# Patient Record
Sex: FEMALE | Race: WHITE | NOT HISPANIC OR LATINO | Employment: OTHER | URBAN - NONMETROPOLITAN AREA
[De-identification: names, ages, dates, MRNs, and addresses within clinical notes are randomized per-mention and may not be internally consistent; named-entity substitution may affect disease eponyms.]

---

## 2023-09-01 ENCOUNTER — OFFICE VISIT (OUTPATIENT)
Dept: FAMILY MEDICINE CLINIC | Facility: CLINIC | Age: 68
End: 2023-09-01
Payer: COMMERCIAL

## 2023-09-01 VITALS
BODY MASS INDEX: 37.94 KG/M2 | OXYGEN SATURATION: 92 % | WEIGHT: 206.2 LBS | HEIGHT: 62 IN | DIASTOLIC BLOOD PRESSURE: 88 MMHG | RESPIRATION RATE: 18 BRPM | SYSTOLIC BLOOD PRESSURE: 152 MMHG | HEART RATE: 64 BPM | TEMPERATURE: 97.3 F

## 2023-09-01 DIAGNOSIS — E78.2 MIXED HYPERLIPIDEMIA: ICD-10-CM

## 2023-09-01 DIAGNOSIS — Z23 ENCOUNTER FOR IMMUNIZATION: ICD-10-CM

## 2023-09-01 DIAGNOSIS — M25.552 LEFT HIP PAIN: ICD-10-CM

## 2023-09-01 DIAGNOSIS — M25.561 CHRONIC PAIN OF RIGHT KNEE: ICD-10-CM

## 2023-09-01 DIAGNOSIS — E66.01 MORBID OBESITY DUE TO EXCESS CALORIES (HCC): ICD-10-CM

## 2023-09-01 DIAGNOSIS — J44.9 CHRONIC OBSTRUCTIVE PULMONARY DISEASE, UNSPECIFIED COPD TYPE (HCC): ICD-10-CM

## 2023-09-01 DIAGNOSIS — Z11.59 NEED FOR HEPATITIS C SCREENING TEST: ICD-10-CM

## 2023-09-01 DIAGNOSIS — Z12.31 ENCOUNTER FOR SCREENING MAMMOGRAM FOR BREAST CANCER: ICD-10-CM

## 2023-09-01 DIAGNOSIS — I10 HTN, GOAL BELOW 140/90: ICD-10-CM

## 2023-09-01 DIAGNOSIS — G89.29 CHRONIC PAIN OF RIGHT KNEE: ICD-10-CM

## 2023-09-01 DIAGNOSIS — Z12.4 SCREENING FOR CERVICAL CANCER: ICD-10-CM

## 2023-09-01 DIAGNOSIS — E11.3391 MODERATE NONPROLIFERATIVE DIABETIC RETINOPATHY OF RIGHT EYE WITHOUT MACULAR EDEMA ASSOCIATED WITH TYPE 2 DIABETES MELLITUS (HCC): ICD-10-CM

## 2023-09-01 DIAGNOSIS — R06.02 SOB (SHORTNESS OF BREATH): ICD-10-CM

## 2023-09-01 DIAGNOSIS — E11.9 TYPE 2 DIABETES MELLITUS WITH HEMOGLOBIN A1C GOAL OF LESS THAN 7.0% (HCC): Primary | ICD-10-CM

## 2023-09-01 PROBLEM — G47.30 SLEEP APNEA IN ADULT: Status: ACTIVE | Noted: 2022-01-18

## 2023-09-01 PROBLEM — E78.5 HYPERLIPIDEMIA: Status: ACTIVE | Noted: 2018-02-16

## 2023-09-01 PROBLEM — H90.3 BILATERAL SENSORINEURAL HEARING LOSS: Status: ACTIVE | Noted: 2022-08-23

## 2023-09-01 PROBLEM — Z87.442 HISTORY OF KIDNEY STONES: Status: ACTIVE | Noted: 2018-02-16

## 2023-09-01 LAB — SL AMB POCT HEMOGLOBIN AIC: 7.1 (ref ?–6.5)

## 2023-09-01 PROCEDURE — 83036 HEMOGLOBIN GLYCOSYLATED A1C: CPT | Performed by: FAMILY MEDICINE

## 2023-09-01 PROCEDURE — 99205 OFFICE O/P NEW HI 60 MIN: CPT | Performed by: FAMILY MEDICINE

## 2023-09-01 RX ORDER — LEVOTHYROXINE SODIUM 0.03 MG/1
TABLET ORAL
COMMUNITY
Start: 2023-08-19

## 2023-09-01 RX ORDER — CARVEDILOL 12.5 MG/1
TABLET ORAL
COMMUNITY
Start: 2023-08-19

## 2023-09-01 RX ORDER — PRAVASTATIN SODIUM 80 MG/1
80 TABLET ORAL DAILY
COMMUNITY
Start: 2023-08-24

## 2023-09-01 RX ORDER — DULAGLUTIDE 0.75 MG/.5ML
INJECTION, SOLUTION SUBCUTANEOUS
COMMUNITY
Start: 2023-07-25

## 2023-09-01 RX ORDER — OLMESARTAN MEDOXOMIL AND HYDROCHLOROTHIAZIDE 40/25 40; 25 MG/1; MG/1
1 TABLET ORAL DAILY
Qty: 90 TABLET | Refills: 3 | Status: SHIPPED | OUTPATIENT
Start: 2023-09-01

## 2023-09-01 RX ORDER — INSULIN GLARGINE 300 U/ML
INJECTION, SOLUTION SUBCUTANEOUS
COMMUNITY
Start: 2023-08-02

## 2023-09-01 NOTE — PROGRESS NOTES
Name: Emily Belcher      : 1955      MRN: [id number]  Encounter Provider: Priscilla Beard MD  Encounter Date: 2023   Encounter department: [address]     1. Type 2 diabetes mellitus with hemoglobin A1c goal of less than 7.0% [facility])  Assessment & Plan:    Lab Results   Component Value Date    HGBA1C 7.1 (A) 2023   Continue current. Orders:  -     POCT hemoglobin A1c  -     Lipid panel; Future  -     Comprehensive metabolic panel; Future; Expected date: 2023  -     TSH, 3rd generation with Free T4 reflex; Future  -     Albumin / creatinine urine ratio; Future    2. 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 Continue current. 3. Need for hepatitis C screening test  -     Hepatitis C Antibody; Future    4. Screening for cervical cancer    5. Encounter for screening mammogram for breast cancer    6. Encounter for immunization    7. Morbid obesity due to excess calories ([address])    8. Chronic obstructive pulmonary disease, unspecified COPD type ([address])  Assessment & Plan:  Stable. Continue current. 9. SOB (shortness of breath)  -     Echo complete w/ contrast if indicated; Future; Expected date: 2023    10. HTN, goal below 140/90  Assessment & Plan:  BP at goal.  Continue current. Orders:  -     olmesartan-hydrochlorothiazide (BENICAR HCT) 40-25 MG per tablet; Take 1 tablet by mouth daily    11. Chronic pain of right knee  -     MRI knee right  wo contrast; Future; Expected date: 2023    12. Left hip pain  -     XR hip/pelv 2-3 vws left if performed; Future; Expected date: 2023    13. Mixed hyperlipidemia  Assessment & Plan:  Stable. Continue current. Subjective      Her A1c is at goal at 7.1%.   She has experienced a few hypoglycemic events. She can recognize them and takes candy when she gets them. Her BP is elevated. She has trouble with SOB and fatigue with stairs. She has no HA. She has CAD. She had MI x 2. She has no CP but does have VALERIO. Her last lipid panel was not at goal.  She is not on high-intensity statin therapy. Review of Systems   All other systems reviewed and are negative. Current Outpatient Medications on File Prior to Visit   Medication Sig   • albuterol (PROVENTIL HFA,VENTOLIN HFA) 90 mcg/act inhaler Inhale   • amLODIPine (NORVASC) 10 mg tablet Take 10 mg by mouth daily   • carvedilol (COREG) 12.5 mg tablet    • clopidogrel (PLAVIX) 75 mg tablet Take 75 mg by mouth daily   • insulin detemir (LEVEMIR) 100 units/mL subcutaneous injection 40 Units daily at bedtime   • isosorbide mononitrate (IMDUR) 30 mg 24 hr tablet One by mouth daily   • levothyroxine 25 mcg tablet    • LORazepam (ATIVAN) 0.5 mg tablet TAKE 1 TABLET BY MOUTH AT BEDTIME AS NEEDED FOR ANXIETY   • metFORMIN (GLUCOPHAGE-XR) 500 mg 24 hr tablet TAKE 2 TABLETS BY MOUTH TWICE DAILY WITH  MORNING  AND  EVENING  MEALS   • nitroglycerin (NITROSTAT) 0.4 mg SL tablet 1 Tab Sublingual Every 5 minutes as needed for chest pain. If pain not resolved after third dose, seek emergency care   • pravastatin (PRAVACHOL) 80 mg tablet Take 80 mg by mouth daily   • tiotropium (SPIRIVA) 18 mcg inhalation capsule Place into inhaler and inhale   • Toujeo SoloStar 300 units/mL CONCENTRATED U-300 injection pen (1-unit dial) INJECT 52 UNITS SUBCUTANEOUSLY ONCE DAILY UP TO 60 UNITS DAILY   • Trulicity 3.14 UJ/5.5VA injection INJECT 0.75 MG UNDER THE SKIN ONCE A WEEK   • oxyCODONE (ROXICODONE) 10 MG TABS 5 mg at bedtime as needed.  (Patient not taking: Reported on 9/1/2023)       Objective     /88   Pulse 64   Temp (!) 97.3 °F (36.3 °C) (Temporal)   Resp 18   Ht 5' 2" (1.575 m)   Wt 93.5 kg (206 lb 3.2 oz)   SpO2 92%   BMI 37.71 kg/m²     Physical Exam  Vitals and nursing note reviewed. Constitutional:       Appearance: Normal appearance. She is obese. Cardiovascular:      Rate and Rhythm: Normal rate and regular rhythm. Pulses: Normal pulses. Heart sounds: Normal heart sounds. Pulmonary:      Effort: Pulmonary effort is normal.      Breath sounds: Normal breath sounds. Abdominal:      General: Abdomen is flat. Bowel sounds are normal.      Palpations: Abdomen is soft. Musculoskeletal:         General: Normal range of motion. Cervical back: Normal range of motion and neck supple. Skin:     General: Skin is warm and dry. Capillary Refill: Capillary refill takes less than 2 seconds. Neurological:      General: No focal deficit present. Mental Status: She is alert and oriented to person, place, and time. Mental status is at baseline. Psychiatric:         Mood and Affect: Mood normal.         Behavior: Behavior normal.         Thought Content:  Thought content normal.         Judgment: Judgment normal.       Pranav Guerrero MD

## 2023-09-05 NOTE — ASSESSMENT & PLAN NOTE
Lab Results   Component Value Date    HGBA1C 7.1 (A) 09/01/2023   A1c is fairly well controlled. Continue current.

## 2023-09-11 LAB
CREAT ?TM UR-SCNC: 45 UMOL/L
EXT ALBUMIN URINE RANDOM: 8.6
HCV AB SER-ACNC: NEGATIVE
MICROALBUMIN/CREAT UR: 191 MG/G{CREAT}

## 2023-09-15 ENCOUNTER — TELEPHONE (OUTPATIENT)
Dept: FAMILY MEDICINE CLINIC | Facility: CLINIC | Age: 68
End: 2023-09-15

## 2023-09-15 DIAGNOSIS — F41.1 GENERALIZED ANXIETY DISORDER: Primary | ICD-10-CM

## 2023-09-15 RX ORDER — LORAZEPAM 0.5 MG/1
0.5 TABLET ORAL
Qty: 30 TABLET | Refills: 0 | Status: SHIPPED | OUTPATIENT
Start: 2023-09-15

## 2023-09-29 ENCOUNTER — OFFICE VISIT (OUTPATIENT)
Dept: FAMILY MEDICINE CLINIC | Facility: CLINIC | Age: 68
End: 2023-09-29
Payer: COMMERCIAL

## 2023-09-29 VITALS
OXYGEN SATURATION: 96 % | TEMPERATURE: 98.2 F | WEIGHT: 210.6 LBS | DIASTOLIC BLOOD PRESSURE: 78 MMHG | RESPIRATION RATE: 16 BRPM | HEART RATE: 93 BPM | SYSTOLIC BLOOD PRESSURE: 138 MMHG | BODY MASS INDEX: 38.52 KG/M2

## 2023-09-29 DIAGNOSIS — Z13.820 SCREENING FOR OSTEOPOROSIS: ICD-10-CM

## 2023-09-29 DIAGNOSIS — E78.2 MIXED HYPERLIPIDEMIA: ICD-10-CM

## 2023-09-29 DIAGNOSIS — Z12.31 ENCOUNTER FOR SCREENING MAMMOGRAM FOR BREAST CANCER: ICD-10-CM

## 2023-09-29 DIAGNOSIS — I10 HTN, GOAL BELOW 140/90: ICD-10-CM

## 2023-09-29 DIAGNOSIS — I25.10 CORONARY ARTERY DISEASE INVOLVING NATIVE HEART WITHOUT ANGINA PECTORIS, UNSPECIFIED VESSEL OR LESION TYPE: ICD-10-CM

## 2023-09-29 DIAGNOSIS — E11.9 TYPE 2 DIABETES MELLITUS WITH HEMOGLOBIN A1C GOAL OF LESS THAN 7.0% (HCC): ICD-10-CM

## 2023-09-29 DIAGNOSIS — F41.1 GENERALIZED ANXIETY DISORDER: ICD-10-CM

## 2023-09-29 DIAGNOSIS — E11.3391 MODERATE NONPROLIFERATIVE DIABETIC RETINOPATHY OF RIGHT EYE WITHOUT MACULAR EDEMA ASSOCIATED WITH TYPE 2 DIABETES MELLITUS (HCC): ICD-10-CM

## 2023-09-29 DIAGNOSIS — J44.9 CHRONIC OBSTRUCTIVE PULMONARY DISEASE, UNSPECIFIED COPD TYPE (HCC): ICD-10-CM

## 2023-09-29 DIAGNOSIS — E03.9 HYPOTHYROIDISM, UNSPECIFIED TYPE: Primary | ICD-10-CM

## 2023-09-29 DIAGNOSIS — Z12.4 SCREENING FOR CERVICAL CANCER: ICD-10-CM

## 2023-09-29 PROBLEM — Z59.9 FINANCIAL DIFFICULTIES: Status: ACTIVE | Noted: 2023-06-01

## 2023-09-29 PROBLEM — Z53.20 COLONOSCOPY REFUSED: Status: ACTIVE | Noted: 2019-03-20

## 2023-09-29 PROBLEM — F17.200 TOBACCO USE DISORDER: Status: ACTIVE | Noted: 2022-01-18

## 2023-09-29 PROCEDURE — G0439 PPPS, SUBSEQ VISIT: HCPCS | Performed by: FAMILY MEDICINE

## 2023-09-29 PROCEDURE — 99215 OFFICE O/P EST HI 40 MIN: CPT | Performed by: FAMILY MEDICINE

## 2023-09-29 RX ORDER — LEVOTHYROXINE SODIUM 0.05 MG/1
50 TABLET ORAL DAILY
Qty: 90 TABLET | Refills: 3 | Status: SHIPPED | OUTPATIENT
Start: 2023-09-29

## 2023-09-29 RX ORDER — LORAZEPAM 0.5 MG/1
0.5 TABLET ORAL
Qty: 30 TABLET | Refills: 5 | Status: SHIPPED | OUTPATIENT
Start: 2023-09-29

## 2023-09-29 NOTE — PROGRESS NOTES
Assessment and Plan:     Problem List Items Addressed This Visit        Endocrine    Type 2 diabetes mellitus with hemoglobin A1c goal of less than 7.0% ([address])   Other Visit Diagnoses     Screening for cervical cancer        Encounter for screening mammogram for breast cancer               Preventive health issues were discussed with patient, and age appropriate screening tests were ordered as noted in patient's After Visit Summary. Personalized health advice and appropriate referrals for health education or preventive services given if needed, as noted in patient's After Visit Summary. History of Present Illness:     Patient presents for a Medicare Wellness Visit    HPI   Patient Care Team:  Linda Bhat MD as PCP - General (Family Medicine)  Linda Bhat MD as PCP - [address] [place] [address] [facility] ([facility]     Review of Systems:     Review of Systems     Problem List:     Patient Active Problem List   Diagnosis   • Bilateral sensorineural hearing loss   • CAD (coronary artery disease)   • Chronic obstructive pulmonary disease (HCC)   • Generalized anxiety disorder   • History of kidney stones   • HTN, goal below 140/90   • Hyperlipidemia   • Moderate nonproliferative diabetic retinopathy of right eye without macular edema associated with type 2 diabetes mellitus ([address])   • Obesity, Class II, BMI 35-39.9, isolated (see actual BMI)   • Sleep apnea in adult   • Type 2 diabetes mellitus with hemoglobin A1c goal of less than 7.0% ([address])   • Morbid obesity due to excess calories [facility])      Past Medical and Surgical History:     Past Medical History:   Diagnosis Date   • Anemia    • Diabetes type 2, controlled ([address])    • Heart attack ([address])    • Hypertension      Past Surgical History:   Procedure Laterality Date   • CARDIAC CATHETERIZATION     • SHOULDER SURGERY Right       Family History:     No family history on file.    Social History:     Social History     Socioeconomic History   • Marital status: /Civil Union Spouse name: Not on file   • Number of children: Not on file   • Years of education: Not on file   • Highest education level: Not on file   Occupational History   • Not on file   Tobacco Use   • Smoking status: Every Day     Packs/day: 0.25     Types: Cigarettes   • Smokeless tobacco: Never   Vaping Use   • Vaping Use: Never used   Substance and Sexual Activity   • Alcohol use: Not Currently   • Drug use: Never   • Sexual activity: Not on file   Other Topics Concern   • Not on file   Social History Narrative   • Not on file     Social Determinants of Health     Financial Resource Strain: Not on file   Food Insecurity: Not on file   Transportation Needs: Not on file   Physical Activity: Not on file   Stress: Not on file   Social Connections: Not on file   Intimate Partner Violence: Not on file   Housing Stability: Not on file      Medications and Allergies:     Current Outpatient Medications   Medication Sig Dispense Refill   • albuterol (PROVENTIL HFA,VENTOLIN HFA) 90 mcg/act inhaler Inhale     • amLODIPine (NORVASC) 10 mg tablet Take 10 mg by mouth daily     • carvedilol (COREG) 12.5 mg tablet      • clopidogrel (PLAVIX) 75 mg tablet Take 75 mg by mouth daily     • insulin detemir (LEVEMIR) 100 units/mL subcutaneous injection 40 Units daily at bedtime     • isosorbide mononitrate (IMDUR) 30 mg 24 hr tablet One by mouth daily     • levothyroxine 25 mcg tablet      • LORazepam (ATIVAN) 0.5 mg tablet Take 1 tablet (0.5 mg total) by mouth daily at bedtime as needed for anxiety 30 tablet 0   • metFORMIN (GLUCOPHAGE-XR) 500 mg 24 hr tablet TAKE 2 TABLETS BY MOUTH TWICE DAILY WITH  MORNING  AND  EVENING  MEALS     • nitroglycerin (NITROSTAT) 0.4 mg SL tablet 1 Tab Sublingual Every 5 minutes as needed for chest pain.  If pain not resolved after third dose, seek emergency care     • olmesartan-hydrochlorothiazide (BENICAR HCT) 40-25 MG per tablet Take 1 tablet by mouth daily 90 tablet 3   • oxyCODONE (ROXICODONE) 10 MG TABS 5 mg at bedtime as needed. (Patient not taking: Reported on 9/1/2023)     • pravastatin (PRAVACHOL) 80 mg tablet Take 80 mg by mouth daily     • tiotropium (SPIRIVA) 18 mcg inhalation capsule Place into inhaler and inhale     • Toujeo SoloStar 300 units/mL CONCENTRATED U-300 injection pen (1-unit dial) INJECT 52 UNITS SUBCUTANEOUSLY ONCE DAILY UP TO 60 UNITS DAILY     • Trulicity 2.44 WC/5.4ZS injection INJECT 0.75 MG UNDER THE SKIN ONCE A WEEK       No current facility-administered medications for this visit. No Known Allergies   Immunizations: There is no immunization history on file for this patient. Health Maintenance:         Topic Date Due   • Cervical Cancer Screening  Never done   • Breast Cancer Screening: Mammogram  Never done   • Colorectal Cancer Screening  Never done   • Hepatitis C Screening  Completed         Topic Date Due   • COVID-19 Vaccine (1) Never done   • Influenza Vaccine (1) 09/01/2023      Medicare Screening Tests and Risk Assessments:     Jourdan Worrell is here for her Subsequent Wellness visit. Last Medicare Wellness visit information reviewed, patient interviewed and updates made to the record today. Health Risk Assessment:   Patient rates overall health as fair. Patient feels that their physical health rating is slightly worse. Patient is very dissatisfied with their life. Eyesight was rated as slightly worse. Hearing was rated as slightly worse. Patient feels that their emotional and mental health rating is much worse. Patients states they are sometimes angry. Patient states they are often unusually tired/fatigued. Pain experienced in the last 7 days has been some. Patient's pain rating has been 4/10. Patient states that she has experienced no weight loss or gain in last 6 months. Depression Screening:   PHQ-2 Score: 2      Fall Risk Screening:    In the past year, patient has experienced: history of falling in past year    Number of falls: 2 or more  Injured during fall?: Yes    Feels unsteady when standing or walking?: No    Worried about falling?: Yes      Urinary Incontinence Screening:   Patient has leaked urine accidently in the last six months. Home Safety:  Patient has trouble with stairs inside or outside of their home. Patient has working smoke alarms and has working carbon monoxide detector. Home safety hazards include: none. Nutrition:   Current diet is Diabetic, Low Cholesterol and Low Saturated Fat. Medications:   Patient is not currently taking any over-the-counter supplements. Patient is able to manage medications. Activities of Daily Living (ADLs)/Instrumental Activities of Daily Living (IADLs):   Walk and transfer into and out of bed and chair?: Yes  Dress and groom yourself?: Yes    Bathe or shower yourself?: Yes    Feed yourself?  Yes  Do your laundry/housekeeping?: Yes  Manage your money, pay your bills and track your expenses?: Yes  Make your own meals?: Yes    Do your own shopping?: Yes    Previous Hospitalizations:   Any hospitalizations or ED visits within the last 12 months?: No      Advance Care Planning:   Living will: No    Durable POA for healthcare: No    Advanced directive: No    Five wishes given: Yes      Cognitive Screening:   Provider or family/friend/caregiver concerned regarding cognition?: No    PREVENTIVE SCREENINGS      Cardiovascular Screening:    General: Screening Not Indicated and History Lipid Disorder      Diabetes Screening:     General: Screening Not Indicated and History Diabetes      Colorectal Cancer Screening:     General: Patient Declines      Breast Cancer Screening:     General: Patient Declines      Cervical Cancer Screening:    General: Screening Not Indicated      Osteoporosis Screening:    General: Patient Declines      Abdominal Aortic Aneurysm (AAA) Screening:        General: Screening Not Indicated      Lung Cancer Screening:     General: Screening Not Indicated      Hepatitis C Screening:    General: Screening Current    Screening, Brief Intervention, and Referral to Treatment (SBIRT)    Screening  Typical number of drinks in a day: 0  Typical number of drinks in a week: 0  Interpretation: Low risk drinking behavior. Single Item Drug Screening:  How often have you used an illegal drug (including marijuana) or a prescription medication for non-medical reasons in the past year? never    Single Item Drug Screen Score: 0  Interpretation: Negative screen for possible drug use disorder    No results found. Physical Exam:     There were no vitals taken for this visit.     Physical Exam     Farrah Larkin MD

## 2023-09-29 NOTE — PATIENT INSTRUCTIONS
Medicare Preventive Visit Patient Instructions  Thank you for completing your Welcome to Medicare Visit or Medicare Annual Wellness Visit today. Your next wellness visit will be due in one year (9/29/2024). The screening/preventive services that you may require over the next 5-10 years are detailed below. Some tests may not apply to you based off risk factors and/or age. Screening tests ordered at today's visit but not completed yet may show as past due. Also, please note that scanned in results may not display below. Preventive Screenings:  Service Recommendations Previous Testing/Comments   Colorectal Cancer Screening  * Colonoscopy    * Fecal Occult Blood Test (FOBT)/Fecal Immunochemical Test (FIT)  * Fecal DNA/Cologuard Test  * Flexible Sigmoidoscopy Age: 43-73 years old   Colonoscopy: every 10 years (may be performed more frequently if at higher risk)  OR  FOBT/FIT: every 1 year  OR  Cologuard: every 3 years  OR  Sigmoidoscopy: every 5 years  Screening may be recommended earlier than age 39 if at higher risk for colorectal cancer. Also, an individualized decision between you and your healthcare provider will decide whether screening between the ages of 77-80 would be appropriate. Colonoscopy: Not on file  FOBT/FIT: Not on file  Cologuard: Not on file  Sigmoidoscopy: Not on file          Breast Cancer Screening Age: 36 years old  Frequency: every 1-2 years  Not required if history of left and right mastectomy Mammogram: Not on file        Cervical Cancer Screening Between the ages of 21-29, pap smear recommended once every 3 years. Between the ages of 32-69, can perform pap smear with HPV co-testing every 5 years.    Recommendations may differ for women with a history of total hysterectomy, cervical cancer, or abnormal pap smears in past. Pap Smear: Not on file    Screening Not Indicated   Hepatitis C Screening Once for adults born between [address] [place] [address]  More frequently in patients at high risk for Hepatitis C Hep C Antibody: 09/11/2023    Screening Current   Diabetes Screening 1-2 times per year if you're at risk for diabetes or have pre-diabetes Fasting glucose: No results in last 5 years (No results in last 5 years)  A1C: 7.1 (9/1/2023)  Screening Not Indicated  History Diabetes   Cholesterol Screening Once every 5 years if you don't have a lipid disorder. May order more often based on risk factors. Lipid panel: 09/11/2023    Screening Not Indicated  History Lipid Disorder     Other Preventive Screenings Covered by Medicare:  1. Abdominal Aortic Aneurysm (AAA) Screening: covered once if your at risk. You're considered to be at risk if you have a family history of AAA. 2. Lung Cancer Screening: covers low dose CT scan once per year if you meet all of the following conditions: (1) Age 48-67; (2) No signs or symptoms of lung cancer; (3) Current smoker or have quit smoking within the last 15 years; (4) You have a tobacco smoking history of at least 20 pack years (packs per day multiplied by number of years you smoked); (5) You get a written order from a healthcare provider. 3. Glaucoma Screening: covered annually if you're considered high risk: (1) You have diabetes OR (2) Family history of glaucoma OR (3)  aged 48 and older OR (3)  American aged 72 and older  3. Osteoporosis Screening: covered every 2 years if you meet one of the following conditions: (1) You're estrogen deficient and at risk for osteoporosis based off medical history and other findings; (2) Have a vertebral abnormality; (3) On glucocorticoid therapy for more than 3 months; (4) Have primary hyperparathyroidism; (5) On osteoporosis medications and need to assess response to drug therapy. · Last bone density test (DXA Scan): Not on file. 5. HIV Screening: covered annually if you're between the age of 14-79. Also covered annually if you are younger than 13 and older than 72 with risk factors for HIV infection.  For pregnant patients, it is covered up to 3 times per pregnancy. Immunizations:  Immunization Recommendations   Influenza Vaccine Annual influenza vaccination during flu season is recommended for all persons aged >= 6 months who do not have contraindications   Pneumococcal Vaccine   * Pneumococcal conjugate vaccine = PCV13 (Prevnar 13), PCV15 (Vaxneuvance), PCV20 (Prevnar 20)  * Pneumococcal polysaccharide vaccine = PPSV23 (Pneumovax) Adults 20-63 years old: 1-3 doses may be recommended based on certain risk factors  Adults 72 years old: 1-2 doses may be recommended based off what pneumonia vaccine you previously received   Hepatitis B Vaccine 3 dose series if at intermediate or high risk (ex: diabetes, end stage renal disease, liver disease)   Tetanus (Td) Vaccine - COST NOT COVERED BY MEDICARE PART B Following completion of primary series, a booster dose should be given every 10 years to maintain immunity against tetanus. Td may also be given as tetanus wound prophylaxis. Tdap Vaccine - COST NOT COVERED BY MEDICARE PART B Recommended at least once for all adults. For pregnant patients, recommended with each pregnancy. Shingles Vaccine (Shingrix) - COST NOT COVERED BY MEDICARE PART B  2 shot series recommended in those aged 48 and above     Health Maintenance Due:      Topic Date Due   • Cervical Cancer Screening  Never done   • Breast Cancer Screening: Mammogram  Never done   • Colorectal Cancer Screening  Never done   • Hepatitis C Screening  Completed     Immunizations Due:      Topic Date Due   • COVID-19 Vaccine (1) Never done   • Influenza Vaccine (1) 09/01/2023     Advance Directives   What are advance directives? Advance directives are legal documents that state your wishes and plans for medical care. These plans are made ahead of time in case you lose your ability to make decisions for yourself.  Advance directives can apply to any medical decision, such as the treatments you want, and if you want to donate organs. What are the types of advance directives? There are many types of advance directives, and each state has rules about how to use them. You may choose a combination of any of the following:  · Living will: This is a written record of the treatment you want. You can also choose which treatments you do not want, which to limit, and which to stop at a certain time. This includes surgery, medicine, IV fluid, and tube feedings. · Durable power of  for healthcare [facility]): This is a written record that states who you want to make healthcare choices for you when you are unable to make them for yourself. This person, called a proxy, is usually a family member or a friend. You may choose more than 1 proxy. · Do not resuscitate (DNR) order:  A DNR order is used in case your heart stops beating or you stop breathing. It is a request not to have certain forms of treatment, such as CPR. A DNR order may be included in other types of advance directives. · Medical directive: This covers the care that you want if you are in a coma, near death, or unable to make decisions for yourself. You can list the treatments you want for each condition. Treatment may include pain medicine, surgery, blood transfusions, dialysis, IV or tube feedings, and a ventilator (breathing machine). · Values history: This document has questions about your views, beliefs, and how you feel and think about life. This information can help others choose the care that you would choose. Why are advance directives important? An advance directive helps you control your care. Although spoken wishes may be used, it is better to have your wishes written down. Spoken wishes can be misunderstood, or not followed. Treatments may be given even if you do not want them. An advance directive may make it easier for your family to make difficult choices about your care.    Fall Prevention    Fall prevention  includes ways to make your home and other areas safer. It also includes ways you can move more carefully to prevent a fall. Health conditions that cause changes in your blood pressure, vision, or muscle strength and coordination may increase your risk for falls. Medicines may also increase your risk for falls if they make you dizzy, weak, or sleepy. Fall prevention tips:   · Stand or sit up slowly. · Use assistive devices as directed. · Wear shoes that fit well and have soles that . · Wear a personal alarm. · Stay active. · Manage your medical conditions. Home Safety Tips:  · Add items to prevent falls in the bathroom. · Keep paths clear. · Install bright lights in your home. · Keep items you use often on shelves within reach. · Paint or place reflective tape on the edges of your stairs. Urinary Incontinence   Urinary incontinence (UI)  is when you lose control of your bladder. UI develops because your bladder cannot store or empty urine properly. The 3 most common types of UI are stress incontinence, urge incontinence, or both. Medicines:   · May be given to help strengthen your bladder control. Report any side effects of medication to your healthcare provider. Do pelvic muscle exercises often:  Your pelvic muscles help you stop urinating. Squeeze these muscles tight for 5 seconds, then relax for 5 seconds. Gradually work up to squeezing for 10 seconds. Do 3 sets of 15 repetitions a day, or as directed. This will help strengthen your pelvic muscles and improve bladder control. Train your bladder:  Go to the bathroom at set times, such as every 2 hours, even if you do not feel the urge to go. You can also try to hold your urine when you feel the urge to go. For example, hold your urine for 5 minutes when you feel the urge to go. As that becomes easier, hold your urine for 10 minutes. Self-care:   · Keep a UI record. Write down how often you leak urine and how much you leak.  Make a note of what you were doing when you leaked urine. · Drink liquids as directed. You may need to limit the amount of liquid you drink to help control your urine leakage. Do not drink any liquid right before you go to bed. Limit or do not have drinks that contain caffeine or alcohol. · Prevent constipation. Eat a variety of high-fiber foods. Good examples are high-fiber cereals, beans, vegetables, and whole-grain breads. Walking is the best way to trigger your intestines to have a bowel movement. · Exercise regularly and maintain a healthy weight. Weight loss and exercise will decrease pressure on your bladder and help you control your leakage. · Use a catheter as directed  to help empty your bladder. A catheter is a tiny, plastic tube that is put into your bladder to drain your urine. · Go to behavior therapy as directed. Behavior therapy may be used to help you learn to control your urge to urinate. Cigarette Smoking and Your Health   Risks to your health if you smoke:  Nicotine and other chemicals found in tobacco damage every cell in your body. Even if you are a light smoker, you have an increased risk for cancer, heart disease, and lung disease. If you are pregnant or have diabetes, smoking increases your risk for complications. Benefits to your health if you stop smoking:   · You decrease respiratory symptoms such as coughing, wheezing, and shortness of breath. · You reduce your risk for cancers of the lung, mouth, throat, kidney, bladder, pancreas, stomach, and cervix. If you already have cancer, you increase the benefits of chemotherapy. You also reduce your risk for cancer returning or a second cancer from developing. · You reduce your risk for heart disease, blood clots, heart attack, and stroke. · You reduce your risk for lung infections, and diseases such as pneumonia, asthma, chronic bronchitis, and emphysema. · Your circulation improves. More oxygen can be delivered to your body.  If you have diabetes, you lower your risk for complications, such as kidney, artery, and eye diseases. You also lower your risk for nerve damage. Nerve damage can lead to amputations, poor vision, and blindness. · You improve your body's ability to heal and to fight infections. For more information and support to stop smoking:   · [employer]. [employer]  Phone: 5- 973 - 181-9495  Web Address: www.[employer]  Weight Management   Why it is important to manage your weight:  Being overweight increases your risk of health conditions such as heart disease, high blood pressure, type 2 diabetes, and certain types of cancer. It can also increase your risk for osteoarthritis, sleep apnea, and other respiratory problems. Aim for a slow, steady weight loss. Even a small amount of weight loss can lower your risk of health problems. How to lose weight safely:  A safe and healthy way to lose weight is to eat fewer calories and get regular exercise. You can lose up about 1 pound a week by decreasing the number of calories you eat by 500 calories each day. Healthy meal plan for weight management:  A healthy meal plan includes a variety of foods, contains fewer calories, and helps you stay healthy. A healthy meal plan includes the following:  · Eat whole-grain foods more often. A healthy meal plan should contain fiber. Fiber is the part of grains, fruits, and vegetables that is not broken down by your body. Whole-grain foods are healthy and provide extra fiber in your diet. Some examples of whole-grain foods are whole-wheat breads and pastas, oatmeal, brown rice, and bulgur. · Eat a variety of vegetables every day. Include dark, leafy greens such as spinach, kale, jeromy greens, and mustard greens. Eat yellow and orange vegetables such as carrots, sweet potatoes, and winter squash. · Eat a variety of fruits every day. Choose fresh or canned fruit (canned in its own juice or light syrup) instead of juice.  Fruit juice has very little or no fiber.  · Eat low-fat dairy foods. Drink fat-free (skim) milk or 1% milk. Eat fat-free yogurt and low-fat cottage cheese. Try low-fat cheeses such as mozzarella and other reduced-fat cheeses. · Choose meat and other protein foods that are low in fat. Choose beans or other legumes such as split peas or lentils. Choose fish, skinless poultry (chicken or turkey), or lean cuts of red meat (beef or pork). Before you cook meat or poultry, cut off any visible fat. · Use less fat and oil. Try baking foods instead of frying them. Add less fat, such as margarine, sour cream, regular salad dressing and mayonnaise to foods. Eat fewer high-fat foods. Some examples of high-fat foods include french fries, doughnuts, ice cream, and cakes. · Eat fewer sweets. Limit foods and drinks that are high in sugar. This includes candy, cookies, regular soda, and sweetened drinks. Exercise:  Exercise at least 30 minutes per day on most days of the week. Some examples of exercise include walking, biking, dancing, and swimming. You can also fit in more physical activity by taking the stairs instead of the elevator or parking farther away from stores. Ask your healthcare provider about the best exercise plan for you. © Copyright [employer] 2018 Information is for End User's use only and may not be sold, redistributed or otherwise used for commercial purposes.  All illustrations and images included in CareNotes® are the copyrighted property of [employer] Inc. or [address] [place], [place] [address]ols

## 2023-09-29 NOTE — PROGRESS NOTES
Bedside shift change report given to Veronica (oncoming nurse) by ELBERT Palma (offgoing nurse). Report included the following information SBAR, Kardex, Procedure Summary, MAR and Recent Results. Name: Alaina Puri      : 1955      MRN: [id number]  Encounter Provider: Merritt Anderson MD  Encounter Date: 2023   Encounter department: [address] [place] [address]     1. Hypothyroidism, unspecified type  -     levothyroxine (Synthroid) 50 mcg tablet; Take 1 tablet (50 mcg total) by mouth daily    2. Type 2 diabetes mellitus with hemoglobin A1c goal of less than 7.0% ([facility])    3. Screening for cervical cancer    4. Encounter for screening mammogram for breast cancer  -     Mammo screening bilateral w 3d & cad; Future; Expected date: 2023    5. Screening for osteoporosis  -     DXA bone density spine hip and pelvis; Future; Expected date: 2023           Subjective      HPI  Review of Systems    Current Outpatient Medications on File Prior to Visit   Medication Sig   • albuterol (PROVENTIL HFA,VENTOLIN HFA) 90 mcg/act inhaler Inhale   • amLODIPine (NORVASC) 10 mg tablet Take 10 mg by mouth daily   • carvedilol (COREG) 12.5 mg tablet    • clopidogrel (PLAVIX) 75 mg tablet Take 75 mg by mouth daily   • insulin detemir (LEVEMIR) 100 units/mL subcutaneous injection 40 Units daily at bedtime   • isosorbide mononitrate (IMDUR) 30 mg 24 hr tablet One by mouth daily   • levothyroxine 25 mcg tablet    • LORazepam (ATIVAN) 0.5 mg tablet Take 1 tablet (0.5 mg total) by mouth daily at bedtime as needed for anxiety   • metFORMIN (GLUCOPHAGE-XR) 500 mg 24 hr tablet TAKE 2 TABLETS BY MOUTH TWICE DAILY WITH  MORNING  AND  EVENING  MEALS   • nitroglycerin (NITROSTAT) 0.4 mg SL tablet 1 Tab Sublingual Every 5 minutes as needed for chest pain.  If pain not resolved after third dose, seek emergency care   • pravastatin (PRAVACHOL) 80 mg tablet Take 80 mg by mouth daily   • tiotropium (SPIRIVA) 18 mcg inhalation capsule Place into inhaler and inhale   • Toujeo SoloStar 300 units/mL CONCENTRATED U-300 injection pen (1-unit dial) INJECT 52 UNITS SUBCUTANEOUSLY ONCE DAILY UP TO 60 UNITS DAILY   • Trulicity 8.42 MB/1.3IX injection INJECT 0.75 MG UNDER THE SKIN ONCE A WEEK   • olmesartan-hydrochlorothiazide (BENICAR HCT) 40-25 MG per tablet Take 1 tablet by mouth daily (Patient not taking: Reported on 9/29/2023)   • oxyCODONE (ROXICODONE) 10 MG TABS 5 mg at bedtime as needed.  (Patient not taking: Reported on 9/1/2023)       Objective     /78   Pulse 93   Temp 98.2 °F (36.8 °C) (Temporal)   Resp 16   Wt 95.5 kg (210 lb 9.6 oz)   SpO2 96%   BMI 38.52 kg/m²     Physical Exam  Tawnya Mills MD

## 2023-10-02 NOTE — PROGRESS NOTES
Name: Chiara West      : 1955      MRN: [id number]  Encounter Provider: Valeri Osullivan MD  Encounter Date: 2023   Encounter department: [address] [place] [address]     1. Hypothyroidism, unspecified type  -     levothyroxine (Synthroid) 50 mcg tablet; Take 1 tablet (50 mcg total) by mouth daily    2. Type 2 diabetes mellitus with hemoglobin A1c goal of less than 7.0% [facility])  Assessment & Plan:    Lab Results   Component Value Date    HGBA1C 7.1 (A) 2023     A1c at goal.  Continue current. 3. Screening for cervical cancer    4. Encounter for screening mammogram for breast cancer  -     Mammo screening bilateral w 3d & cad; Future; Expected date: 2023    5. Screening for osteoporosis  -     DXA bone density spine hip and pelvis; Future; Expected date: 2023    6. 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  Continue current. 7. Mixed hyperlipidemia  Assessment & Plan:  She does not tolerate statins. She is willing to go to an every other day regimen. 8. HTN, goal below 140/90  Assessment & Plan:  BP at goal.  Continue current. 9. Chronic obstructive pulmonary disease, unspecified COPD type ([address])  Assessment & Plan:  Stable. Continue current. 10. Coronary artery disease involving native heart without angina pectoris, unspecified vessel or lesion type  Assessment & Plan:  Stable. Continue current. 11. Generalized anxiety disorder  -     LORazepam (ATIVAN) 0.5 mg tablet; Take 1 tablet (0.5 mg total) by mouth daily at bedtime as needed for anxiety           Subjective      He A1c is fairly well controled on her current regimen. She has no side effects or hypoglycemia. Her BP is at goal on her current regimen. She has no CP or SOB.   She has no HA or vision changes. Her lipids are at goal.  She has normal LFTs and no myalgia or muscle weakness. Her COPD is stable. She denies cough, sputum production, wheeze, or VALERIO. Review of Systems    Current Outpatient Medications on File Prior to Visit   Medication Sig   • albuterol (PROVENTIL HFA,VENTOLIN HFA) 90 mcg/act inhaler Inhale   • amLODIPine (NORVASC) 10 mg tablet Take 10 mg by mouth daily   • carvedilol (COREG) 12.5 mg tablet    • clopidogrel (PLAVIX) 75 mg tablet Take 75 mg by mouth daily   • insulin detemir (LEVEMIR) 100 units/mL subcutaneous injection 40 Units daily at bedtime   • isosorbide mononitrate (IMDUR) 30 mg 24 hr tablet One by mouth daily   • levothyroxine 25 mcg tablet    • metFORMIN (GLUCOPHAGE-XR) 500 mg 24 hr tablet TAKE 2 TABLETS BY MOUTH TWICE DAILY WITH  MORNING  AND  EVENING  MEALS   • nitroglycerin (NITROSTAT) 0.4 mg SL tablet 1 Tab Sublingual Every 5 minutes as needed for chest pain. If pain not resolved after third dose, seek emergency care   • pravastatin (PRAVACHOL) 80 mg tablet Take 80 mg by mouth daily   • tiotropium (SPIRIVA) 18 mcg inhalation capsule Place into inhaler and inhale   • Toujeo SoloStar 300 units/mL CONCENTRATED U-300 injection pen (1-unit dial) INJECT 52 UNITS SUBCUTANEOUSLY ONCE DAILY UP TO 60 UNITS DAILY   • Trulicity 1.27 AN/9.9WW injection INJECT 0.75 MG UNDER THE SKIN ONCE A WEEK   • oxyCODONE (ROXICODONE) 10 MG TABS 5 mg at bedtime as needed. (Patient not taking: Reported on 9/1/2023)       Objective     /78   Pulse 93   Temp 98.2 °F (36.8 °C) (Temporal)   Resp 16   Wt 95.5 kg (210 lb 9.6 oz)   SpO2 96%   BMI 38.52 kg/m²     Physical Exam  Vitals and nursing note reviewed. Constitutional:       Appearance: Normal appearance. She is obese. Cardiovascular:      Rate and Rhythm: Normal rate and regular rhythm. Pulses: Normal pulses. Heart sounds: Normal heart sounds.    Pulmonary:      Effort: Pulmonary effort is normal. Breath sounds: Normal breath sounds. Abdominal:      General: Abdomen is flat. Bowel sounds are normal.      Palpations: Abdomen is soft. Musculoskeletal:         General: Normal range of motion. Cervical back: Normal range of motion and neck supple. Skin:     General: Skin is warm and dry. Capillary Refill: Capillary refill takes less than 2 seconds. Neurological:      General: No focal deficit present. Mental Status: She is alert and oriented to person, place, and time. Mental status is at baseline. Psychiatric:         Mood and Affect: Mood normal.         Behavior: Behavior normal.         Thought Content:  Thought content normal.         Judgment: Judgment normal.       Natasha Hurt MD

## 2023-10-17 LAB
LEFT EYE DIABETIC RETINOPATHY: POSITIVE
RIGHT EYE DIABETIC RETINOPATHY: POSITIVE

## 2023-11-13 DIAGNOSIS — Z12.31 SCREENING MAMMOGRAM FOR BREAST CANCER: Primary | ICD-10-CM

## 2023-11-30 ENCOUNTER — RA CDI HCC (OUTPATIENT)
Dept: OTHER | Facility: HOSPITAL | Age: 68
End: 2023-11-30

## 2023-11-30 NOTE — PROGRESS NOTES
[address] [facility] coding opportunities     E11.36     Chart Reviewed number of suggestions sent to Provider: 1     Patients Insurance     Medicare Insurance: [employer] Conjuntivae and eyelids appear normal, Sclerae : White without injection

## 2023-12-07 ENCOUNTER — OFFICE VISIT (OUTPATIENT)
Dept: FAMILY MEDICINE CLINIC | Facility: CLINIC | Age: 68
End: 2023-12-07
Payer: COMMERCIAL

## 2023-12-07 VITALS
TEMPERATURE: 97.9 F | DIASTOLIC BLOOD PRESSURE: 82 MMHG | HEART RATE: 73 BPM | WEIGHT: 211 LBS | BODY MASS INDEX: 38.59 KG/M2 | OXYGEN SATURATION: 98 % | RESPIRATION RATE: 14 BRPM | SYSTOLIC BLOOD PRESSURE: 108 MMHG

## 2023-12-07 DIAGNOSIS — I10 HTN, GOAL BELOW 140/90: ICD-10-CM

## 2023-12-07 DIAGNOSIS — E78.2 MIXED HYPERLIPIDEMIA: ICD-10-CM

## 2023-12-07 DIAGNOSIS — E03.9 HYPOTHYROIDISM, UNSPECIFIED TYPE: ICD-10-CM

## 2023-12-07 DIAGNOSIS — E11.9 TYPE 2 DIABETES MELLITUS WITH HEMOGLOBIN A1C GOAL OF LESS THAN 7.0% (HCC): Primary | ICD-10-CM

## 2023-12-07 DIAGNOSIS — J44.9 CHRONIC OBSTRUCTIVE PULMONARY DISEASE, UNSPECIFIED COPD TYPE (HCC): ICD-10-CM

## 2023-12-07 DIAGNOSIS — I25.10 CORONARY ARTERY DISEASE INVOLVING NATIVE HEART WITHOUT ANGINA PECTORIS, UNSPECIFIED VESSEL OR LESION TYPE: ICD-10-CM

## 2023-12-07 DIAGNOSIS — F41.1 GENERALIZED ANXIETY DISORDER: ICD-10-CM

## 2023-12-07 DIAGNOSIS — M17.11 ARTHRITIS OF RIGHT KNEE: ICD-10-CM

## 2023-12-07 DIAGNOSIS — E11.3391 MODERATE NONPROLIFERATIVE DIABETIC RETINOPATHY OF RIGHT EYE WITHOUT MACULAR EDEMA ASSOCIATED WITH TYPE 2 DIABETES MELLITUS (HCC): ICD-10-CM

## 2023-12-07 PROCEDURE — 99213 OFFICE O/P EST LOW 20 MIN: CPT

## 2023-12-07 RX ORDER — FLUTICASONE PROPIONATE 50 MCG
2 SPRAY, SUSPENSION (ML) NASAL DAILY
COMMUNITY
Start: 2023-10-23

## 2023-12-07 RX ORDER — FLUTICASONE PROPIONATE AND SALMETEROL 250; 50 UG/1; UG/1
1 POWDER RESPIRATORY (INHALATION) 2 TIMES DAILY
COMMUNITY
Start: 2023-10-14

## 2023-12-07 NOTE — ASSESSMENT & PLAN NOTE
Last diabetic eye exam showed no progression. Follow-up with eye exam as scheduled.   Lab Results   Component Value Date    HGBA1C 7.1 (A) 09/01/2023

## 2023-12-07 NOTE — ASSESSMENT & PLAN NOTE
Patient currently taking pravastatin 80 mg half a tablet daily due to history of not tolerating statin treatment. Will follow-up with lipid panel for next visit.

## 2023-12-07 NOTE — PROGRESS NOTES
Name: Raphael Bishop      : 1955      MRN: [id number]  Encounter Provider: Diana Yarbrough PA-C  Encounter Date: 2023   Encounter department: [address]     1. Type 2 diabetes mellitus with hemoglobin A1c goal of less than 7.0% ([facility])  Assessment & Plan:  Patient last A1c was 7.1. At goal.  Patient will follow-up with A1c in 3 months. Lab Results   Component Value Date    HGBA1C 7.1 (A) 2023       Orders:  -     Comprehensive metabolic panel; Future  -     CBC and differential; Future  -     HEMOGLOBIN A1C W/ EAG ESTIMATION; Future    2. Coronary artery disease involving native heart without angina pectoris, unspecified vessel or lesion type  Assessment & Plan:  Patient has no acute symptoms today. Patient last echo was 54. Follow-up as scheduled. Orders:  -     CBC and differential; Future    3. HTN, goal below 140/90  Assessment & Plan:  Blood pressure at goal.  Continue current treatment. 4. Mixed hyperlipidemia  Assessment & Plan:  Patient currently taking pravastatin 80 mg half a tablet daily due to history of not tolerating statin treatment. Will follow-up with lipid panel for next visit. Orders:  -     CBC and differential; Future    5. Hypothyroidism, unspecified type  -     CBC and differential; Future  -     TSH, 3rd generation with Free T4 reflex; Future  -     T4, free; Future  -     T3; Future    6. Moderate nonproliferative diabetic retinopathy of right eye without macular edema associated with type 2 diabetes mellitus ([address])  Assessment & Plan:  Last diabetic eye exam showed no progression. Follow-up with eye exam as scheduled. Lab Results   Component Value Date    HGBA1C 7.1 (A) 2023         7. Chronic obstructive pulmonary disease, unspecified COPD type ([address])  Assessment & Plan:  Stable on current treatment. Follow-up with any exacerbations.       8. Generalized anxiety disorder  Assessment & Plan:  Patient offered treatment of SSRI. Patient would like to think about possible treatment options for Next appointment. 9. Arthritis of right knee  Assessment & Plan:  Patient offered referral to orthopedics. Would like to wait for next appointment to decide if she would like to go back to Ortho. I have spent a total time of 20 minutes on 12/07/23 in caring for this patient including Diagnostic results, Prognosis, Risks and benefits of tx options, Instructions for management, Patient and family education, Importance of tx compliance, Risk factor reductions, Documenting in the medical record, Reviewing / ordering tests, medicine, procedures  , and Obtaining or reviewing history  . BMI Counseling: Body mass index is 38.59 kg/m². The BMI is above normal. Nutrition recommendations include decreasing portion sizes, encouraging healthy choices of fruits and vegetables, decreasing fast food intake, consuming healthier snacks, limiting drinks that contain sugar, moderation in carbohydrate intake, increasing intake of lean protein, reducing intake of saturated and trans fat and reducing intake of cholesterol. Exercise recommendations include moderate physical activity 150 minutes/week. No pharmacotherapy was ordered. Rationale for BMI follow-up plan is due to patient being overweight or obese. Falls Plan of Care: balance, strength, and gait training instructions were provided. Subjective      Patient is a 80-year-old female presenting for 3-month follow-up. Patient has no concerns today. Patient recently had an echocardiogram done where her ejection fraction was 55. Patient also recently saw her optometrist for diabetic eye exam.  There were no changes in her redness. Patient also recently saw podiatrist and states she had a diabetic foot exam done. Patient states her right knee pain has returned as a result of her osteoarthritis.   She has had joint injections in the past, but has not follow-up with Ortho since then. Patient states she would prefer to wait a few months for follow-up before referred to Ortho again. Review of Systems   Constitutional:  Negative for appetite change, chills, diaphoresis, fatigue and fever. HENT:  Negative for congestion, ear discharge, ear pain, postnasal drip, rhinorrhea, sinus pressure, sinus pain, sneezing and sore throat. Eyes:  Negative for pain, discharge, redness, itching and visual disturbance. Respiratory:  Negative for apnea, cough, chest tightness and shortness of breath. Cardiovascular:  Negative for chest pain, palpitations and leg swelling. Gastrointestinal:  Negative for abdominal pain, blood in stool, constipation, diarrhea, nausea and vomiting. Endocrine: Negative for cold intolerance, heat intolerance, polydipsia and polyuria. Genitourinary:  Negative for dysuria, flank pain, frequency, hematuria, urgency, vaginal bleeding and vaginal discharge. Musculoskeletal:  Negative for arthralgias, back pain, joint swelling, myalgias, neck pain and neck stiffness. Skin:  Negative for rash. Allergic/Immunologic: Negative for environmental allergies and food allergies. Neurological:  Positive for light-headedness (Patient had hypotension on the day before Thanksgiving. Isolated episode. ). Negative for dizziness, tremors, seizures, syncope, weakness, numbness and headaches. Hematological:  Negative for adenopathy. Does not bruise/bleed easily. Psychiatric/Behavioral:  Negative for agitation, confusion, decreased concentration, dysphoric mood, sleep disturbance and suicidal ideas. The patient is not nervous/anxious and is not hyperactive.         Current Outpatient Medications on File Prior to Visit   Medication Sig    albuterol (PROVENTIL HFA,VENTOLIN HFA) 90 mcg/act inhaler Inhale    amLODIPine (NORVASC) 10 mg tablet Take 10 mg by mouth daily    carvedilol (COREG) 12.5 mg tablet     clopidogrel (PLAVIX) 75 mg tablet Take 75 mg by mouth daily fluticasone (FLONASE) 50 mcg/act nasal spray 2 sprays into each nostril daily    Fluticasone-Salmeterol (Advair) 250-50 mcg/dose inhaler Inhale 1 puff 2 (two) times a day    insulin detemir (LEVEMIR) 100 units/mL subcutaneous injection 40 Units daily at bedtime    isosorbide mononitrate (IMDUR) 30 mg 24 hr tablet One by mouth daily    levothyroxine 25 mcg tablet     LORazepam (ATIVAN) 0.5 mg tablet Take 1 tablet (0.5 mg total) by mouth daily at bedtime as needed for anxiety    metFORMIN (GLUCOPHAGE-XR) 500 mg 24 hr tablet TAKE 2 TABLETS BY MOUTH TWICE DAILY WITH  MORNING  AND  EVENING  MEALS    nitroglycerin (NITROSTAT) 0.4 mg SL tablet 1 Tab Sublingual Every 5 minutes as needed for chest pain. If pain not resolved after third dose, seek emergency care    pravastatin (PRAVACHOL) 80 mg tablet Take 80 mg by mouth daily    tiotropium (SPIRIVA) 18 mcg inhalation capsule Place into inhaler and inhale    Toujeo SoloStar 300 units/mL CONCENTRATED U-300 injection pen (1-unit dial) INJECT 52 UNITS SUBCUTANEOUSLY ONCE DAILY UP TO 60 UNITS DAILY    Trulicity 4.04 AM/1.8QS injection INJECT 0.75 MG UNDER THE SKIN ONCE A WEEK    levothyroxine (Synthroid) 50 mcg tablet Take 1 tablet (50 mcg total) by mouth daily (Patient not taking: Reported on 12/7/2023)    oxyCODONE (ROXICODONE) 10 MG TABS 5 mg at bedtime as needed. (Patient not taking: Reported on 9/1/2023)       Objective     /82 (BP Location: Left arm, Patient Position: Sitting)   Pulse 73   Temp 97.9 °F (36.6 °C) (Tympanic)   Resp 14   Wt 95.7 kg (211 lb)   SpO2 98%   BMI 38.59 kg/m²   Patient's shoes and socks removed. Right Foot/Ankle   Right Foot Inspection  Skin Exam: skin normal and skin intact. No dry skin, no warmth, no callus, no erythema, no maceration, no abnormal color, no pre-ulcer, no ulcer and no callus. Toe Exam: ROM and strength within normal limits.      Sensory   Monofilament testing: intact    Vascular  Capillary refills: < 3 seconds      Left Foot/Ankle  Left Foot Inspection  Skin Exam: skin normal and skin intact. No dry skin, no warmth, no erythema, no maceration, normal color, no pre-ulcer, no ulcer and no callus. Toe Exam: ROM and strength within normal limits. Sensory   Monofilament testing: intact    Vascular  Capillary refills: < 3 seconds      Assign Risk Category  No deformity present  No loss of protective sensation  No weak pulses  Risk: 0     Physical Exam  Vitals and nursing note reviewed. Constitutional:       Appearance: Normal appearance. She is obese. She is not ill-appearing or toxic-appearing. HENT:      Head: Normocephalic and atraumatic. Right Ear: Tympanic membrane normal.      Left Ear: Tympanic membrane normal.      Nose: Nose normal.      Mouth/Throat:      Mouth: Mucous membranes are moist.      Pharynx: Oropharynx is clear. Eyes:      Extraocular Movements: Extraocular movements intact. Conjunctiva/sclera: Conjunctivae normal.      Pupils: Pupils are equal, round, and reactive to light. Cardiovascular:      Rate and Rhythm: Normal rate and regular rhythm. Pulses: Normal pulses. no weak pulses     Heart sounds: Normal heart sounds. Pulmonary:      Effort: Pulmonary effort is normal.      Breath sounds: Normal breath sounds. No stridor. No wheezing. Abdominal:      General: Bowel sounds are normal.      Palpations: Abdomen is soft. There is no mass. Tenderness: There is no abdominal tenderness. Musculoskeletal:         General: No tenderness. Normal range of motion. Cervical back: Normal range of motion and neck supple. No tenderness. Right lower leg: No edema. Left lower leg: No edema. Feet:      Right foot:      Skin integrity: No ulcer, skin breakdown, erythema, warmth, callus or dry skin. Left foot:      Skin integrity: No ulcer, skin breakdown, erythema, warmth, callus or dry skin. Lymphadenopathy:      Cervical: No cervical adenopathy. Skin:     General: Skin is warm. Capillary Refill: Capillary refill takes less than 2 seconds. Findings: No erythema or rash. Neurological:      General: No focal deficit present. Mental Status: She is alert and oriented to person, place, and time. Mental status is at baseline. Motor: No weakness. Gait: Gait normal.   Psychiatric:         Mood and Affect: Mood normal.         Behavior: Behavior normal.         Thought Content:  Thought content normal.         Judgment: Judgment normal.       Gen Patterson PA-C

## 2023-12-07 NOTE — ASSESSMENT & PLAN NOTE
Patient last A1c was 7.1. At goal.  Patient will follow-up with A1c in 3 months.   Lab Results   Component Value Date    HGBA1C 7.1 (A) 09/01/2023

## 2023-12-07 NOTE — ASSESSMENT & PLAN NOTE
Patient offered treatment of SSRI. Patient would like to think about possible treatment options for Next appointment.

## 2023-12-07 NOTE — ASSESSMENT & PLAN NOTE
Patient offered referral to orthopedics. Would like to wait for next appointment to decide if she would like to go back to Ortho.

## 2023-12-13 ENCOUNTER — TELEPHONE (OUTPATIENT)
Dept: FAMILY MEDICINE CLINIC | Facility: CLINIC | Age: 68
End: 2023-12-13

## 2023-12-13 DIAGNOSIS — I10 HTN, GOAL BELOW 140/90: Primary | ICD-10-CM

## 2023-12-13 RX ORDER — OLMESARTAN MEDOXOMIL AND HYDROCHLOROTHIAZIDE 40/25 40; 25 MG/1; MG/1
1 TABLET ORAL DAILY
Qty: 90 TABLET | Refills: 3 | Status: SHIPPED | OUTPATIENT
Start: 2023-12-13

## 2023-12-13 NOTE — TELEPHONE ENCOUNTER
Yes, this is Hilary Gaona, [address] [place] [address]. [facility] for a prescription. The generic of Benicar HCT tablet 40-25 milligram. Thank you.

## 2023-12-18 ENCOUNTER — TELEPHONE (OUTPATIENT)
Dept: FAMILY MEDICINE CLINIC | Facility: CLINIC | Age: 68
End: 2023-12-18

## 2023-12-22 DIAGNOSIS — I10 HTN, GOAL BELOW 140/90: Primary | ICD-10-CM

## 2023-12-22 RX ORDER — CARVEDILOL 12.5 MG/1
12.5 TABLET ORAL 2 TIMES DAILY WITH MEALS
Qty: 180 TABLET | Refills: 3 | Status: SHIPPED | OUTPATIENT
Start: 2023-12-22

## 2023-12-22 NOTE — TELEPHONE ENCOUNTER
Please refill med. Any questions or concerns please call pt.   Send to [facility] [place] please

## 2024-01-09 DIAGNOSIS — E11.9 TYPE 2 DIABETES MELLITUS WITH HEMOGLOBIN A1C GOAL OF LESS THAN 7.0% (HCC): Primary | ICD-10-CM

## 2024-01-09 NOTE — TELEPHONE ENCOUNTER
Medication from previous pcp and needs her new pcp to take over.     Reason for call:   [x] Refill   [] Prior Auth  [] Other:     Office:   [x] PCP/Provider -   [] Specialty/Provider -     Medication:   Metformin 500mg- Take 2 tablets by mouth twice daily with morning and evening meals    Pharmacy: [facility] [place]    Does the patient have enough for 3 days?   [x] Yes   [] No - Send as HP to POD

## 2024-01-10 RX ORDER — METFORMIN HYDROCHLORIDE 500 MG/1
500 TABLET, EXTENDED RELEASE ORAL
Qty: 90 TABLET | Refills: 0 | Status: SHIPPED | OUTPATIENT
Start: 2024-01-10

## 2024-02-19 LAB — HBA1C MFR BLD HPLC: 7.9 %

## 2024-02-21 DIAGNOSIS — E11.9 TYPE 2 DIABETES MELLITUS WITH HEMOGLOBIN A1C GOAL OF LESS THAN 7.0% (HCC): ICD-10-CM

## 2024-02-21 DIAGNOSIS — E11.9 TYPE 2 DIABETES MELLITUS WITH HEMOGLOBIN A1C GOAL OF LESS THAN 7.0% (HCC): Primary | ICD-10-CM

## 2024-03-04 ENCOUNTER — OFFICE VISIT (OUTPATIENT)
Dept: FAMILY MEDICINE CLINIC | Facility: CLINIC | Age: 69
End: 2024-03-04
Payer: COMMERCIAL

## 2024-03-04 VITALS
TEMPERATURE: 97.3 F | SYSTOLIC BLOOD PRESSURE: 136 MMHG | WEIGHT: 216.4 LBS | HEART RATE: 97 BPM | HEIGHT: 62 IN | DIASTOLIC BLOOD PRESSURE: 72 MMHG | OXYGEN SATURATION: 94 % | BODY MASS INDEX: 39.82 KG/M2

## 2024-03-04 DIAGNOSIS — Z23 ENCOUNTER FOR ADMINISTRATION OF VACCINE: ICD-10-CM

## 2024-03-04 DIAGNOSIS — E78.2 MIXED HYPERLIPIDEMIA: ICD-10-CM

## 2024-03-04 DIAGNOSIS — Z12.31 ENCOUNTER FOR SCREENING MAMMOGRAM FOR BREAST CANCER: ICD-10-CM

## 2024-03-04 DIAGNOSIS — I10 HTN, GOAL BELOW 140/90: ICD-10-CM

## 2024-03-04 DIAGNOSIS — Z78.0 ENCOUNTER FOR OSTEOPOROSIS SCREENING IN ASYMPTOMATIC POSTMENOPAUSAL PATIENT: ICD-10-CM

## 2024-03-04 DIAGNOSIS — E11.3391 MODERATE NONPROLIFERATIVE DIABETIC RETINOPATHY OF RIGHT EYE WITHOUT MACULAR EDEMA ASSOCIATED WITH TYPE 2 DIABETES MELLITUS (HCC): ICD-10-CM

## 2024-03-04 DIAGNOSIS — J44.9 CHRONIC OBSTRUCTIVE PULMONARY DISEASE, UNSPECIFIED COPD TYPE (HCC): ICD-10-CM

## 2024-03-04 DIAGNOSIS — Z13.820 ENCOUNTER FOR OSTEOPOROSIS SCREENING IN ASYMPTOMATIC POSTMENOPAUSAL PATIENT: ICD-10-CM

## 2024-03-04 DIAGNOSIS — E66.01 MORBID OBESITY DUE TO EXCESS CALORIES (HCC): ICD-10-CM

## 2024-03-04 DIAGNOSIS — E11.9 TYPE 2 DIABETES MELLITUS WITH HEMOGLOBIN A1C GOAL OF LESS THAN 7.0% (HCC): Primary | ICD-10-CM

## 2024-03-04 DIAGNOSIS — N18.5 CHRONIC RENAL DISEASE, STAGE V (HCC): ICD-10-CM

## 2024-03-04 DIAGNOSIS — Z12.4 SCREENING FOR CERVICAL CANCER: ICD-10-CM

## 2024-03-04 PROCEDURE — 99214 OFFICE O/P EST MOD 30 MIN: CPT | Performed by: FAMILY MEDICINE

## 2024-03-04 PROCEDURE — G2211 COMPLEX E/M VISIT ADD ON: HCPCS | Performed by: FAMILY MEDICINE

## 2024-03-04 RX ORDER — INSULIN GLARGINE 100 [IU]/ML
52 INJECTION, SOLUTION SUBCUTANEOUS DAILY
Qty: 15 ML | Refills: 5 | Status: SHIPPED | OUTPATIENT
Start: 2024-03-04 | End: 2024-03-04 | Stop reason: SDUPTHER

## 2024-03-04 RX ORDER — INSULIN GLARGINE 100 [IU]/ML
52 INJECTION, SOLUTION SUBCUTANEOUS DAILY
Qty: 15 ML | Refills: 5 | Status: SHIPPED | OUTPATIENT
Start: 2024-03-04

## 2024-03-04 NOTE — PROGRESS NOTES
Assessment/Plan:    Moderate nonproliferative diabetic retinopathy of right eye without macular edema associated with type 2 diabetes mellitus ([facility])    Lab Results   Component Value Date    HGBA1C 7.9 (H) 02/19/2024   F/U with ophtho.    Type 2 diabetes mellitus with hemoglobin A1c goal of less than 7.0% ([facility])    Lab Results   Component Value Date    HGBA1C 7.9 (H) 02/19/2024   Due to insurance, switch to Lantus.  Referral to clinical pharmacy re:  insulin and GLP-1.    Chronic obstructive pulmonary disease (HCC)  She is happy with the results from Advair.  Continue current.    HTN, goal below 140/90  BP at goal.  Continue current.    Hyperlipidemia  Stable.  Continue current.       Diagnoses and all orders for this visit:    Type 2 diabetes mellitus with hemoglobin A1c goal of less than 7.0% ([facility])  -     Ambulatory referral to clinical pharmacy; Future  -     Insulin Glargine Solostar (Lantus SoloStar) 100 UNIT/ML SOPN; Inject 0.52 mL (52 Units total) under the skin in the morning    Screening for cervical cancer    Encounter for screening mammogram for breast cancer  -     Mammo screening bilateral w 3d & cad; Future    Encounter for osteoporosis screening in asymptomatic postmenopausal patient  -     DXA bone density spine hip and pelvis; Future    Chronic renal disease, stage V ([facility])    Chronic obstructive pulmonary disease, unspecified COPD type ([facility])    Morbid obesity due to excess calories ([facility])    Moderate nonproliferative diabetic retinopathy of right eye without macular edema associated with type 2 diabetes mellitus ([facility])    Encounter for administration of vaccine  -     RSV Pre-Fusion F A&B Vac Rcmb (Abrysvo) SOLR vaccine; Inject 0.5 mL into a muscle once for 1 dose    HTN, goal below 140/90    Mixed hyperlipidemia          Subjective:      Patient ID: Alma Worley is a 68 y.o. female.    She is doing well overall.      Her A1c is fairly well controlled in the office today.  She has no side effects or  hypoglycemia.    Her BP is at goal on her current regimen.  She has no CP or SOB.    Her lipids are at goal on her current regimen.  She has normal LFTs and no myalgia or muscle weakness.        The following portions of the patient's history were reviewed and updated as appropriate: She  has a past medical history of Anemia, Diabetes type 2, controlled ([facility]), Heart attack ([facility]), and Hypertension.  She   Patient Active Problem List    Diagnosis Date Noted    Arthritis of right knee 12/07/2023    Morbid obesity due to excess calories ([facility]) 09/01/2023    Chronic obstructive pulmonary disease ([facility]) 06/01/2023    Moderate nonproliferative diabetic retinopathy of right eye without macular edema associated with type 2 diabetes mellitus ([facility]) 06/01/2023    Financial difficulties 06/01/2023    Bilateral sensorineural hearing loss 08/23/2022    Sleep apnea in adult 01/18/2022    Tobacco use disorder 01/18/2022    Colonoscopy refused 03/20/2019    History of kidney stones 02/16/2018    HTN, goal below 140/90 02/16/2018    Hyperlipidemia 02/16/2018    Type 2 diabetes mellitus with hemoglobin A1c goal of less than 7.0% ([facility]) 02/16/2018    CAD (coronary artery disease) 03/25/2014    Obesity, Class II, BMI 35-39.9, isolated (see actual BMI) 03/25/2014    Generalized anxiety disorder 03/24/2014    Hypothyroidism 03/24/2014     She  has a past surgical history that includes Shoulder surgery (Right) and Cardiac catheterization.  Her family history is not on file.  She  reports that she has been smoking cigarettes. She has never used smokeless tobacco. She reports that she does not currently use alcohol. She reports that she does not use drugs.  Current Outpatient Medications   Medication Sig Dispense Refill    albuterol (PROVENTIL HFA,VENTOLIN HFA) 90 mcg/act inhaler Inhale      amLODIPine (NORVASC) 10 mg tablet Take 10 mg by mouth daily      carvedilol (COREG) 12.5 mg tablet Take 1 tablet (12.5 mg total) by mouth 2 (two) times a  day with meals 180 tablet 3    clopidogrel (PLAVIX) 75 mg tablet Take 75 mg by mouth daily      dulaglutide (Trulicity) 1.5 MG/0.5ML injection Inject 0.5 mL (1.5 mg total) under the skin every 7 days 6 mL 1    fluticasone (FLONASE) 50 mcg/act nasal spray 2 sprays into each nostril as needed      Fluticasone-Salmeterol (Advair) 250-50 mcg/dose inhaler Inhale 1 puff 2 (two) times a day      Insulin Glargine Solostar (Lantus SoloStar) 100 UNIT/ML SOPN Inject 0.52 mL (52 Units total) under the skin in the morning 15 mL 5    isosorbide mononitrate (IMDUR) 30 mg 24 hr tablet One by mouth daily      levothyroxine 25 mcg tablet       LORazepam (ATIVAN) 0.5 mg tablet Take 1 tablet (0.5 mg total) by mouth daily at bedtime as needed for anxiety 30 tablet 5    metFORMIN (GLUCOPHAGE-XR) 500 mg 24 hr tablet Take 1 tablet (500 mg total) by mouth daily with breakfast 90 tablet 0    nitroglycerin (NITROSTAT) 0.4 mg SL tablet 1 Tab Sublingual Every 5 minutes as needed for chest pain. If pain not resolved after third dose, seek emergency care      pravastatin (PRAVACHOL) 80 mg tablet Take 80 mg by mouth daily      RSV Pre-Fusion F A&B Vac Rcmb (Abrysvo) SOLR vaccine Inject 0.5 mL into a muscle once for 1 dose 1 each 0     No current facility-administered medications for this visit.     Current Outpatient Medications on File Prior to Visit   Medication Sig    albuterol (PROVENTIL HFA,VENTOLIN HFA) 90 mcg/act inhaler Inhale    amLODIPine (NORVASC) 10 mg tablet Take 10 mg by mouth daily    carvedilol (COREG) 12.5 mg tablet Take 1 tablet (12.5 mg total) by mouth 2 (two) times a day with meals    clopidogrel (PLAVIX) 75 mg tablet Take 75 mg by mouth daily    dulaglutide (Trulicity) 1.5 MG/0.5ML injection Inject 0.5 mL (1.5 mg total) under the skin every 7 days    fluticasone (FLONASE) 50 mcg/act nasal spray 2 sprays into each nostril as needed    Fluticasone-Salmeterol (Advair) 250-50 mcg/dose inhaler Inhale 1 puff 2 (two) times a day     "isosorbide mononitrate (IMDUR) 30 mg 24 hr tablet One by mouth daily    levothyroxine 25 mcg tablet     LORazepam (ATIVAN) 0.5 mg tablet Take 1 tablet (0.5 mg total) by mouth daily at bedtime as needed for anxiety    metFORMIN (GLUCOPHAGE-XR) 500 mg 24 hr tablet Take 1 tablet (500 mg total) by mouth daily with breakfast    nitroglycerin (NITROSTAT) 0.4 mg SL tablet 1 Tab Sublingual Every 5 minutes as needed for chest pain. If pain not resolved after third dose, seek emergency care    pravastatin (PRAVACHOL) 80 mg tablet Take 80 mg by mouth daily    [DISCONTINUED] Toujeo SoloStar 300 units/mL CONCENTRATED U-300 injection pen (1-unit dial) INJECT 52 UNITS SUBCUTANEOUSLY ONCE DAILY UP TO 60 UNITS DAILY    [DISCONTINUED] insulin detemir (LEVEMIR) 100 units/mL subcutaneous injection 40 Units daily at bedtime (Patient not taking: Reported on 3/4/2024)    [DISCONTINUED] levothyroxine (Synthroid) 50 mcg tablet Take 1 tablet (50 mcg total) by mouth daily (Patient not taking: Reported on 12/7/2023)    [DISCONTINUED] olmesartan-hydrochlorothiazide (BENICAR HCT) 40-25 MG per tablet Take 1 tablet by mouth daily (Patient not taking: Reported on 3/4/2024)    [DISCONTINUED] oxyCODONE (ROXICODONE) 10 MG TABS 5 mg at bedtime as needed. (Patient not taking: Reported on 9/1/2023)    [DISCONTINUED] tiotropium (SPIRIVA) 18 mcg inhalation capsule Place into inhaler and inhale (Patient not taking: Reported on 3/4/2024)     No current facility-administered medications on file prior to visit.     She has No Known Allergies..    Review of Systems   All other systems reviewed and are negative.        Objective:      /72 (BP Location: Left arm, Patient Position: Sitting)   Pulse 97   Temp (!) 97.3 °F (36.3 °C) (Tympanic)   Ht 5' 2\" (1.575 m)   Wt 98.2 kg (216 lb 6.4 oz)   SpO2 94%   BMI 39.58 kg/m²          Physical Exam  Vitals and nursing note reviewed.   Constitutional:       Appearance: Normal appearance. She is obese. "   Cardiovascular:      Rate and Rhythm: Normal rate and regular rhythm.      Pulses: Normal pulses.      Heart sounds: Normal heart sounds.   Pulmonary:      Effort: Pulmonary effort is normal.      Breath sounds: Normal breath sounds.   Abdominal:      General: Abdomen is flat. Bowel sounds are normal.      Palpations: Abdomen is soft.   Musculoskeletal:         General: Normal range of motion.      Cervical back: Normal range of motion and neck supple.   Skin:     General: Skin is warm and dry.      Capillary Refill: Capillary refill takes less than 2 seconds.   Neurological:      General: No focal deficit present.      Mental Status: She is alert and oriented to person, place, and time. Mental status is at baseline.   Psychiatric:         Mood and Affect: Mood normal.         Behavior: Behavior normal.         Thought Content: Thought content normal.         Judgment: Judgment normal.

## 2024-03-04 NOTE — ASSESSMENT & PLAN NOTE
Lab Results   Component Value Date    HGBA1C 7.9 (H) 02/19/2024   Due to insurance, switch to Lantus.  Referral to clinical pharmacy re:  insulin and GLP-1.

## 2024-03-25 ENCOUNTER — TELEPHONE (OUTPATIENT)
Dept: ADMINISTRATIVE | Facility: OTHER | Age: 69
End: 2024-03-25

## 2024-03-25 NOTE — LETTER
Diabetic Eye Exam Form    Date Requested: 24  Patient: Alma Worley  Patient : 1955   Referring Provider: Reece Sanchez MD      DIABETIC Eye Exam Date _______________________________      Type of Exam MUST be documented for Diabetic Eye Exams. Please CHECK ONE.     Retinal Exam       Dilated Retinal Exam       OCT       Optomap-Iris Exam      Fundus Photography       Left Eye - Please check Retinopathy or No Retinopathy        Exam did show retinopathy    Exam did not show retinopathy       Right Eye - Please check Retinopathy or No Retinopathy       Exam did show retinopathy    Exam did not show retinopathy       Comments __________________________________________________________    Practice Providing Exam ______________________________________________    Exam Performed By (print name) _______________________________________      Provider Signature ___________________________________________________      These reports are needed for  compliance.  Please fax this completed form and a copy of the Diabetic Eye Exam report to our office located at [address] [place] [place] [address] as soon as possible via Fax [phone] attention Jonas: Phone [phone]  We thank you for your assistance in treating our mutual patient.

## 2024-03-25 NOTE — TELEPHONE ENCOUNTER
Upon review of the In Basket request and the patient's chart, initial outreach has been made via fax to facility. Please see Contacts section for details.     Thank you  Jonas Garcia MA

## 2024-03-25 NOTE — TELEPHONE ENCOUNTER
----- Message from Susy Borden MA sent at 3/24/2024  4:28 PM EDT -----  Regarding: Care Gap request  03/24/24 4:28 PM    Hello, our patient attached above has had Diabetic Eye Exam completed/performed. Please assist in updating the patient chart by pulling the Care Everywhere (CE) document. The date of service is 2023.     Thank you,  Susy Borden  [facility] PRIMARY [facility]

## 2024-03-26 ENCOUNTER — CLINICAL SUPPORT (OUTPATIENT)
Dept: FAMILY MEDICINE CLINIC | Facility: CLINIC | Age: 69
End: 2024-03-26

## 2024-03-26 DIAGNOSIS — E11.9 TYPE 2 DIABETES MELLITUS WITH HEMOGLOBIN A1C GOAL OF LESS THAN 7.0% (HCC): ICD-10-CM

## 2024-03-26 RX ORDER — INSULIN GLARGINE 100 [IU]/ML
52 INJECTION, SOLUTION SUBCUTANEOUS DAILY
Qty: 15 ML | Refills: 5 | Status: CANCELLED | OUTPATIENT
Start: 2024-03-26

## 2024-03-26 RX ORDER — PRAVASTATIN SODIUM 20 MG
20 TABLET ORAL EVERY OTHER DAY
Qty: 45 TABLET | Refills: 1 | Status: SHIPPED | OUTPATIENT
Start: 2024-03-26

## 2024-03-26 RX ORDER — PRAVASTATIN SODIUM 40 MG
20 TABLET ORAL EVERY OTHER DAY
COMMUNITY
End: 2024-03-26

## 2024-03-26 RX ORDER — METFORMIN HYDROCHLORIDE 500 MG/1
1000 TABLET, EXTENDED RELEASE ORAL 2 TIMES DAILY WITH MEALS
Qty: 360 TABLET | Refills: 1 | Status: SHIPPED | OUTPATIENT
Start: 2024-03-26

## 2024-03-26 NOTE — PROGRESS NOTES
[facility]  Suzy Garcia, Pharmacist    Alma Worley is a 68 y.o. female who was referred to the pharmacist for T2DM education and management, referred by Reece Sanchez MD .      Telemedicine consent  The patient was identified by name and date of birth. Alma Worley was informed that this is a telemedicine visit and that the visit is being conducted through Telephone.  My office door was closed. No one else was in the room.  She acknowledged consent and understanding of privacy and security of the video platform. The patient has agreed to participate and understands they can discontinue the visit at any time.    Assessment/ Plan       Type 2 Diabetes:  Goal A1c <7% based on ADA Guidelines. Most recent A1c   Lab Results   Component Value Date    HGBA1C 7.9 (H) 02/19/2024      Complications:  Microvascular complications: retinopathy   Macrovascular complications: CAD  Current Diabetes Regimen:  Trulicity 0.75 mg weekly  Toujeo 52 units    Changes to Medication Regimen:   If PCP is in agreement with plan  Patient is calling [employer] to apply. She declined referral to social work for assistance with application. With [employer] brand medications will be no more than $15 per month.   Patient will call back if approved so that updated scripts can be sent to her pharmacy for higher dose of Trulicity 1.5 mg and Toujeo insulin.   Patient has been taking pravastatin 40 mg - 1/2 tab (20 mg) every other day. Rx updated at pharmacy so patient does not appear non-complaint on quality measures.   Patient takes metformin 500 mg 2 tabs BID. Rx updated at pharmacy to reflect current dose. Most recent GFR >90  Can also consider CGM with follow up    2. On Additional Therapies:  Statin: yes  ACEI/ARB: no  ASA: no    3. Hyperlipidemia with clinical ASCVD event:   2018 ACC/AHA lipid guidelines recommend high statin. LDL goal <55 per 2024 ADA Standards of care..   Patient currently on low  intensity and tolerating well without side effects. Intolerant to higher doses of statin  Last lipid panel elevated. Recommend repeat lipid and consider Zetia of LDL not at goal.       4. HTN:   BP goal less than 130/80 mmHg.   In office BP above goal, HR acceptable.   Continue to monitor     Referrals placed at this visit: None    Follow-up: TBD once patient approved for [employer]    Subjective     Medication Adherence/ Tolerability:  Trulicity 0.75 mg weekly- Tier 3 medication. Unsure cost. Does admit to missing dose occasionally. Sunday is injection day. Has not increased to 1.5 mg weekly yet.   Toujeo 52 units daily   Metformin  mg - 2 tabs BID    Medications Tried in Past:      2. Lifestyle:       3. Home monitoring devices  Glucometer: Yes, Brand:   CGM: No, Brand:     Objective     Lab Results   Component Value Date    HGBA1C 7.9 (H) 02/19/2024    HGBA1C 7.9 02/19/2024    HGBA1C 7.1 (A) 09/01/2023       Pharmacist Tracking Tool     Pharmacist Tracking Tool  Reason For Outreach: Embedded Pharmacist  Demographics:  Intervention Method: Phone  Type of Intervention: New  Topics Addressed: Diabetes and Dyslipidemia  Pharmacologic Interventions: Med Rec  Non-Pharmacologic Interventions: Cost, Disease state education, and Medication/Device education  Time:  Direct Patient Care:  45  mins  Care Coordination:  15  mins  Recommendation Recipient: Patient/Caregiver and Provider  Outcome: Accepted

## 2024-03-27 NOTE — TELEPHONE ENCOUNTER
Upon review of the In Basket request we were able to locate, review, and update the patient chart as requested for Diabetic Eye Exam.    Any additional questions or concerns should be emailed to the Practice Liaisons via the appropriate education email address, please do not reply via In Basket.    Thank you  Jonas Garcia MA

## 2024-04-01 ENCOUNTER — TELEPHONE (OUTPATIENT)
Age: 69
End: 2024-04-01

## 2024-04-01 DIAGNOSIS — I10 HTN, GOAL BELOW 140/90: Primary | ICD-10-CM

## 2024-04-01 RX ORDER — OLMESARTAN MEDOXOMIL AND HYDROCHLOROTHIAZIDE 40/25 40; 25 MG/1; MG/1
1 TABLET ORAL DAILY
Qty: 90 TABLET | Refills: 3 | Status: SHIPPED | OUTPATIENT
Start: 2024-04-01

## 2024-04-01 NOTE — TELEPHONE ENCOUNTER
Patient called requesting refill  on Olmesartan HCTZ 40-25mg her pharmacy advised her the doctor canceled medication and she believes that was in error. Patient does not have a 3 day supply!

## 2024-04-02 ENCOUNTER — TELEPHONE (OUTPATIENT)
Dept: FAMILY MEDICINE CLINIC | Facility: CLINIC | Age: 69
End: 2024-04-02

## 2024-04-02 DIAGNOSIS — E11.9 TYPE 2 DIABETES MELLITUS WITH HEMOGLOBIN A1C GOAL OF LESS THAN 7.0% (HCC): ICD-10-CM

## 2024-04-02 DIAGNOSIS — E11.9 TYPE 2 DIABETES MELLITUS WITH HEMOGLOBIN A1C GOAL OF LESS THAN 7.0% (HCC): Primary | ICD-10-CM

## 2024-04-02 RX ORDER — INSULIN GLARGINE 300 U/ML
52 INJECTION, SOLUTION SUBCUTANEOUS DAILY
Qty: 15 ML | Refills: 1 | Status: SHIPPED | OUTPATIENT
Start: 2024-04-02

## 2024-04-02 NOTE — TELEPHONE ENCOUNTER
Patient called refill line, she would line the next dosage for trulicity. [facility]

## 2024-04-15 ENCOUNTER — TELEPHONE (OUTPATIENT)
Age: 69
End: 2024-04-15

## 2024-04-15 NOTE — TELEPHONE ENCOUNTER
Pt states ER put her on Lasix and she only has 2 left.  Needs refill and not on med list.    Medication: lasix     Dose/Frequency: 40 mg once a day    Quantity:    Pharmacy: [facility]    Office:   [x] PCP/Provider -   [] Speciality/Provider -     Does the patient have enough for 3 days?   [] Yes   [x] No - Send as HP to POD

## 2024-04-15 NOTE — TELEPHONE ENCOUNTER
Booked at 11:30 with Gen on 4/16 Per Adelina. Pt said she will speak with Gen Regarding the Lasix when she comes into the appt.

## 2024-04-15 NOTE — TELEPHONE ENCOUNTER
Gen where can we over book her-would have to be tomorrow or Wednesday. She also needs refills-see below

## 2024-04-16 ENCOUNTER — OFFICE VISIT (OUTPATIENT)
Dept: FAMILY MEDICINE CLINIC | Facility: CLINIC | Age: 69
End: 2024-04-16
Payer: COMMERCIAL

## 2024-04-16 VITALS
HEART RATE: 90 BPM | HEIGHT: 62 IN | BODY MASS INDEX: 38.2 KG/M2 | DIASTOLIC BLOOD PRESSURE: 80 MMHG | TEMPERATURE: 97.6 F | OXYGEN SATURATION: 96 % | WEIGHT: 207.6 LBS | SYSTOLIC BLOOD PRESSURE: 100 MMHG

## 2024-04-16 DIAGNOSIS — I50.9 CONGESTIVE HEART FAILURE, UNSPECIFIED HF CHRONICITY, UNSPECIFIED HEART FAILURE TYPE (HCC): ICD-10-CM

## 2024-04-16 DIAGNOSIS — I48.91 ATRIAL FIBRILLATION, UNSPECIFIED TYPE (HCC): Primary | ICD-10-CM

## 2024-04-16 PROCEDURE — G2211 COMPLEX E/M VISIT ADD ON: HCPCS

## 2024-04-16 PROCEDURE — 99213 OFFICE O/P EST LOW 20 MIN: CPT

## 2024-04-16 RX ORDER — FUROSEMIDE 40 MG/1
40 TABLET ORAL DAILY
Qty: 7 TABLET | Refills: 0 | Status: SHIPPED | OUTPATIENT
Start: 2024-04-16

## 2024-04-16 RX ORDER — FUROSEMIDE 40 MG/1
40 TABLET ORAL DAILY
COMMUNITY
Start: 2024-04-14 | End: 2024-04-16 | Stop reason: SDUPTHER

## 2024-04-16 NOTE — PROGRESS NOTES
Name: Alma Worley      : 1955      MRN: [id number]  Encounter Provider: Gen Patterson PA-C  Encounter Date: 2024   Encounter department: [facility]    Assessment & Plan     1. Atrial fibrillation, unspecified type (HCC)    2. Congestive heart failure, unspecified HF chronicity, unspecified heart failure type (HCC)  -     furosemide (LASIX) 40 mg tablet; Take 1 tablet (40 mg total) by mouth daily  -     B-Type Natriuretic Peptide(BNP); Future  -     Comprehensive metabolic panel; Future  -     CBC and differential; Future  -     XR chest pa & lateral; Future; Expected date: 2024    Gave patient a weeks worth of Lasix to get her through to her cardiology appointment.  Will repeat BNP, CMP, CBC, and chest x-ray today.  Patient is rate controlled and anticoagulated for her A-fib.  Patient is educated on signs and symptoms of CAD, CVA, decompensating CHF, and A-fib, and is to go to ER if these present.       Subjective      Patient is a 68-year-old female presenting for ER follow-up.  On , patient was diagnosed with A-fib and congestive heart failure.  Patient was placed on Eliquis, carvedilol increased, Lasix.  Patient had 2 syncopal episodes prior to going to the ER along with shortness of breath.  Patient had an increased BNP, abnormal chest x-ray.  Patient states that today she feels much better, and does not have any issues except for very mild shortness of breath.  Patient has an appointment with [facility] on .  She was not kept overnight, and they did not do any procedures in the hospital.  Patient has no other concerns today.      Review of Systems   Constitutional:  Negative for appetite change, chills, diaphoresis, fatigue and fever.   HENT:  Negative for congestion, ear discharge, ear pain, postnasal drip, rhinorrhea, sinus pressure, sinus pain, sneezing and sore throat.    Eyes:  Negative for pain, discharge, redness, itching and visual  disturbance.   Respiratory:  Positive for shortness of breath. Negative for apnea, cough, chest tightness and wheezing.    Cardiovascular:  Negative for chest pain, palpitations and leg swelling.   Gastrointestinal:  Negative for abdominal pain, blood in stool, constipation, diarrhea, nausea and vomiting.   Endocrine: Negative for cold intolerance, heat intolerance, polydipsia and polyuria.   Genitourinary:  Negative for dysuria, flank pain, frequency, hematuria and urgency.   Musculoskeletal:  Negative for arthralgias, back pain, myalgias, neck pain and neck stiffness.   Skin:  Negative for color change and rash.   Allergic/Immunologic: Negative for environmental allergies and food allergies.   Neurological:  Negative for dizziness, tremors, seizures, syncope, speech difficulty, weakness, light-headedness, numbness and headaches.   Hematological:  Negative for adenopathy. Does not bruise/bleed easily.   Psychiatric/Behavioral:  Negative for agitation, confusion, decreased concentration, dysphoric mood, hallucinations, self-injury, sleep disturbance and suicidal ideas. The patient is not nervous/anxious and is not hyperactive.    All other systems reviewed and are negative.      Current Outpatient Medications on File Prior to Visit   Medication Sig    albuterol (PROVENTIL HFA,VENTOLIN HFA) 90 mcg/act inhaler Inhale    amLODIPine (NORVASC) 10 mg tablet Take 10 mg by mouth daily    apixaban (ELIQUIS) 5 mg Take 5 mg by mouth 2 (two) times a day    carvedilol (COREG) 12.5 mg tablet Take 1 tablet (12.5 mg total) by mouth 2 (two) times a day with meals    clopidogrel (PLAVIX) 75 mg tablet Take 75 mg by mouth daily    dulaglutide (Trulicity) 1.5 MG/0.5ML injection Inject 0.5 mL (1.5 mg total) under the skin every 7 days    fluticasone (FLONASE) 50 mcg/act nasal spray 2 sprays into each nostril as needed    Fluticasone-Salmeterol (Advair) 250-50 mcg/dose inhaler Inhale 1 puff 2 (two) times a day    insulin glargine (Toujeo  "SoloStar) 300 units/mL CONCENTRATED U-300 injection pen (1-unit dial) Inject 52 Units under the skin daily    isosorbide mononitrate (IMDUR) 30 mg 24 hr tablet One by mouth daily    levothyroxine 25 mcg tablet     LORazepam (ATIVAN) 0.5 mg tablet Take 1 tablet (0.5 mg total) by mouth daily at bedtime as needed for anxiety    metFORMIN (GLUCOPHAGE-XR) 500 mg 24 hr tablet Take 2 tablets (1,000 mg total) by mouth 2 (two) times a day with meals    nitroglycerin (NITROSTAT) 0.4 mg SL tablet 1 Tab Sublingual Every 5 minutes as needed for chest pain. If pain not resolved after third dose, seek emergency care    olmesartan-hydrochlorothiazide (BENICAR HCT) 40-25 MG per tablet Take 1 tablet by mouth daily    pravastatin (PRAVACHOL) 20 mg tablet Take 1 tablet (20 mg total) by mouth every other day    [DISCONTINUED] furosemide (LASIX) 40 mg tablet Take 40 mg by mouth daily       Objective     /80 (BP Location: Left arm, Patient Position: Sitting)   Pulse 90   Temp 97.6 °F (36.4 °C) (Tympanic)   Ht 5' 2\" (1.575 m)   Wt 94.2 kg (207 lb 9.6 oz)   SpO2 96%   BMI 37.97 kg/m²     Physical Exam  Vitals and nursing note reviewed.   Constitutional:       Appearance: Normal appearance. She is normal weight. She is not ill-appearing or toxic-appearing.   HENT:      Head: Normocephalic and atraumatic.      Right Ear: Tympanic membrane normal.      Left Ear: Tympanic membrane normal.      Nose: Nose normal.      Mouth/Throat:      Mouth: Mucous membranes are moist.      Pharynx: Oropharynx is clear.   Eyes:      Extraocular Movements: Extraocular movements intact.      Conjunctiva/sclera: Conjunctivae normal.      Pupils: Pupils are equal, round, and reactive to light.   Cardiovascular:      Rate and Rhythm: Normal rate and regular rhythm.      Pulses: Normal pulses.      Heart sounds: Normal heart sounds. No murmur heard.     No friction rub. No gallop.   Pulmonary:      Effort: Pulmonary effort is normal. No respiratory " distress.      Breath sounds: Normal breath sounds. No stridor. No wheezing, rhonchi or rales.   Chest:      Chest wall: No tenderness.   Abdominal:      General: Bowel sounds are normal.      Palpations: Abdomen is soft. There is no mass.      Tenderness: There is no abdominal tenderness.   Musculoskeletal:         General: No tenderness. Normal range of motion.      Cervical back: Normal range of motion and neck supple. No tenderness.      Right lower leg: Edema (1+) present.      Left lower leg: No edema.   Lymphadenopathy:      Cervical: No cervical adenopathy.   Skin:     General: Skin is warm.      Capillary Refill: Capillary refill takes less than 2 seconds.      Findings: No erythema, lesion or rash.   Neurological:      General: No focal deficit present.      Mental Status: She is alert and oriented to person, place, and time. Mental status is at baseline.      Motor: No weakness.      Coordination: Coordination normal.      Gait: Gait normal.   Psychiatric:         Mood and Affect: Mood normal.         Behavior: Behavior normal.         Thought Content: Thought content normal.         Judgment: Judgment normal.     Gen Patterson PA-C

## 2024-04-17 ENCOUNTER — TELEPHONE (OUTPATIENT)
Dept: FAMILY MEDICINE CLINIC | Facility: CLINIC | Age: 69
End: 2024-04-17

## 2024-04-17 NOTE — TELEPHONE ENCOUNTER
Patient aware.  She said her symptoms are getting a little better.  She is able to breathe better.  In the morning she has a little trouble breathing so she uses her inhaler.  She is coughing up mucous.  She was concerned when I told her the BNP was elevated & wanted to know if there's anything she should do.

## 2024-04-17 NOTE — TELEPHONE ENCOUNTER
----- Message from Gen Patterson PA-C sent at 4/17/2024  6:56 AM EDT -----  BNP still high but rest of lab work normal.  How have symptoms progressed?

## 2024-04-18 ENCOUNTER — TELEPHONE (OUTPATIENT)
Dept: FAMILY MEDICINE CLINIC | Facility: CLINIC | Age: 69
End: 2024-04-18

## 2024-04-18 NOTE — TELEPHONE ENCOUNTER
She did tell me when I talked to her that she would be ok.  She thought you only gave her 4 pills, but I told her no, you gave her 7.   Mother  Still living? No  Family history of breast cancer in mother, Age at diagnosis: Age Unknown  Hypertension, Age at diagnosis: Age Unknown     Father  Still living? No  Family history of stroke, Age at diagnosis: Age Unknown

## 2024-04-18 NOTE — TELEPHONE ENCOUNTER
----- Message from Gen Patterson PA-C sent at 4/17/2024  8:39 AM EDT -----  At this point she should follow up with cardiology as schedule.  If symptoms worsen she should go back to the ER.  ----- Message -----  From: Bella Paniagua  Sent: 4/17/2024   8:19 AM EDT  To: Gen Patterson PA-C    Patient aware.  She said her symptoms are getting a little better.  She is able to breathe better.  In the morning she has a little trouble breathing so she uses her inhaler.  She is coughing up mucous.  She was concerned when I told her the BNP was elevated & wanted to know if there's anything she should do.

## 2024-04-18 NOTE — TELEPHONE ENCOUNTER
Patient  has an appt  with cardiology on Monday.  She was concerned about running out of lasix because you only gave her seven.  I told her to call the office if she needs anything.

## 2024-04-18 NOTE — TELEPHONE ENCOUNTER
----- Message from Gen Patterson PA-C sent at 4/18/2024  8:26 AM EDT -----  If she is only taking one a day that should be ok.  Is she taking more than that?

## 2024-04-22 DIAGNOSIS — F41.1 GENERALIZED ANXIETY DISORDER: ICD-10-CM

## 2024-04-22 RX ORDER — LORAZEPAM 0.5 MG/1
0.5 TABLET ORAL
Qty: 30 TABLET | Refills: 5 | Status: SHIPPED | OUTPATIENT
Start: 2024-04-22

## 2024-04-22 NOTE — TELEPHONE ENCOUNTER
Reason for call:   [x] Refill   [] Prior Auth  [] Other:     Office:   [x] PCP/Provider - Reece Sanchez MD   [] Specialty/Provider -     Medication: LORazepam (ATIVAN) 0.5 mg tablet     Dose/Frequency: Take 1 tablet (0.5 mg total) by mouth daily at bedtime as needed for anxiety,     Quantity: 30    Pharmacy: [facility] [id number] [place] [address] [phone]    Does the patient have enough for 3 days?   [] Yes   [x] No - Send as HP to POD

## 2024-04-30 ENCOUNTER — CLINICAL SUPPORT (OUTPATIENT)
Dept: FAMILY MEDICINE CLINIC | Facility: CLINIC | Age: 69
End: 2024-04-30

## 2024-04-30 ENCOUNTER — OFFICE VISIT (OUTPATIENT)
Dept: FAMILY MEDICINE CLINIC | Facility: CLINIC | Age: 69
End: 2024-04-30
Payer: COMMERCIAL

## 2024-04-30 VITALS
DIASTOLIC BLOOD PRESSURE: 82 MMHG | SYSTOLIC BLOOD PRESSURE: 130 MMHG | HEIGHT: 62 IN | WEIGHT: 222.8 LBS | TEMPERATURE: 98.3 F | BODY MASS INDEX: 41 KG/M2 | OXYGEN SATURATION: 96 % | HEART RATE: 93 BPM

## 2024-04-30 DIAGNOSIS — I50.9 ACUTE ON CHRONIC CONGESTIVE HEART FAILURE, UNSPECIFIED HEART FAILURE TYPE (HCC): Primary | ICD-10-CM

## 2024-04-30 DIAGNOSIS — R35.0 URINARY FREQUENCY: ICD-10-CM

## 2024-04-30 DIAGNOSIS — E11.9 TYPE 2 DIABETES MELLITUS WITH HEMOGLOBIN A1C GOAL OF LESS THAN 7.0% (HCC): Primary | ICD-10-CM

## 2024-04-30 PROCEDURE — G2211 COMPLEX E/M VISIT ADD ON: HCPCS

## 2024-04-30 PROCEDURE — 1159F MED LIST DOCD IN RCRD: CPT

## 2024-04-30 PROCEDURE — 99213 OFFICE O/P EST LOW 20 MIN: CPT

## 2024-04-30 PROCEDURE — 3079F DIAST BP 80-89 MM HG: CPT

## 2024-04-30 PROCEDURE — 1160F RVW MEDS BY RX/DR IN RCRD: CPT

## 2024-04-30 PROCEDURE — 3075F SYST BP GE 130 - 139MM HG: CPT

## 2024-04-30 RX ORDER — LOSARTAN POTASSIUM 100 MG/1
100 TABLET ORAL DAILY
COMMUNITY
Start: 2024-04-22

## 2024-04-30 RX ORDER — POTASSIUM CHLORIDE 20 MEQ/1
20 TABLET, EXTENDED RELEASE ORAL DAILY
Qty: 30 TABLET | Refills: 2 | Status: SHIPPED | OUTPATIENT
Start: 2024-04-30

## 2024-04-30 RX ORDER — EZETIMIBE 10 MG
10 TABLET ORAL DAILY
COMMUNITY
Start: 2024-04-22

## 2024-04-30 NOTE — ASSESSMENT & PLAN NOTE
Patient weight is at 222 today compared to 207.  Vitals are stable.  Exam shows 2+ right leg edema, but no left leg edema.  Lungs are clear.  Patient is to opt the Lasix to 40 mg in the morning and 20 mg in the afternoon.  Patient educated on side effects of medication.  She is also prescribed potassium 20 mEq daily due to her last potassium being 3.6 on Sunday, and increasing this dosage.  I will be getting repeat basic metabolic panel and BNP in 1 week.  Patient is to continue follow-up with cardiology, and go to the emergency department with worsening symptoms of heart failure.  Patient is also educated on signs and symptoms of hypokalemia and hyperkalemia, and is to go to the ER if these present.  Wt Readings from Last 3 Encounters:   04/30/24 101 kg (222 lb 12.8 oz)   04/16/24 94.2 kg (207 lb 9.6 oz)   03/04/24 98.2 kg (216 lb 6.4 oz)

## 2024-04-30 NOTE — PROGRESS NOTES
[facility] Pharm[facility]  Suzy Garcia, Pharmacist    Alma Worley is a 68 y.o. female who was referred to the pharmacist for T2DM education and management, referred by Reece Sanchez MD .      Telemedicine consent  The patient was identified by name and date of birth. Alma Worley was informed that this is a telemedicine visit and that the visit is being conducted through Telephone.  My office door was closed. No one else was in the room.  She acknowledged consent and understanding of privacy and security of the video platform. The patient has agreed to participate and understands they can discontinue the visit at any time.    Assessment/ Plan       Type 2 Diabetes:  Goal A1c <7% based on ADA Guidelines. Most recent A1c   Lab Results   Component Value Date    HGBA1C 7.9 (H) 02/19/2024      Self monitoring blood glucose  Has not been checking home BG for past 3 weeks. Plans to start monitoring again   Complications:  Microvascular complications: retinopathy   Macrovascular complications: CAD  Current Diabetes Regimen:  Trulicity 1.5 mg weekly  Toujeo 52 units  Metformin  mg 2 tabs BID    Changes to Medication Regimen:   If PCP is in agreement with plan  Has not been able to increase Trulicity due to medication shortage. She will reach out if pharmacy still cannot get in stock by end of next week. Plan to switch to Ozempic if that is the case   Performed medication reconciliation. The following changes were made at cardiology appt on 4/22/24  Added Zetia, , losartan  Increased Coreg dose   Removed olmesartan/hctz   Patient declined CGM at this time     2. On Additional Therapies:  Statin: yes  ACEI/ARB: no  ASA: no    3. Hyperlipidemia with clinical ASCVD event:   2018 ACC/AHA lipid guidelines recommend high statin. LDL goal <55 per 2024 ADA Standards of care..   Patient currently on low intensity and tolerating well without side effects. Intolerant to higher doses of statin  Last  lipid panel elevated. Zetia was just started. Repeat lipid panel in 3 months      4. HTN:   BP goal less than 130/80 mmHg.   In office BP above goal, HR acceptable.   Continue to monitor     Referrals placed at this visit: None    Follow-up:3 weeks    Subjective     Medication Adherence/ Tolerability:  Trulicity 1.5- has not increased dose because pharmacy has been trying to order in medication.  Toujeo 52 units daily   Metformin  mg - 2 tabs BID    Medications Tried in Past:      2. Lifestyle:       3. Cost  Approved for [employer]    4. Home monitoring devices  Glucometer: Yes, Brand:   CGM: No, Brand:     Objective     Lab Results   Component Value Date    HGBA1C 7.9 (H) 02/19/2024    HGBA1C 7.9 02/19/2024    HGBA1C 7.1 (A) 09/01/2023       Pharmacist Tracking Tool     Pharmacist Tracking Tool  Reason For Outreach: Embedded Pharmacist  Demographics:  Intervention Method: Phone  Type of Intervention: Follow-Up  Topics Addressed: Diabetes and Dyslipidemia  Pharmacologic Interventions: Med Rec  Non-Pharmacologic Interventions: Cost, Disease state education, and Medication/Device education  Time:  Direct Patient Care:  15  mins  Care Coordination:  15  mins  Recommendation Recipient: Patient/Caregiver and Provider  Outcome: Accepted

## 2024-04-30 NOTE — PROGRESS NOTES
Name: Alma Worley      : 1955      MRN: [id number]  Encounter Provider: Gen Patterson PA-C  Encounter Date: 2024   Encounter department: [facility]    Assessment & Plan     1. Acute on chronic congestive heart failure, unspecified heart failure type (HCC)  Assessment & Plan:  Patient weight is at 222 today compared to 207.  Vitals are stable.  Exam shows 2+ right leg edema, but no left leg edema.  Lungs are clear.  Patient is to opt the Lasix to 40 mg in the morning and 20 mg in the afternoon.  Patient educated on side effects of medication.  She is also prescribed potassium 20 mEq daily due to her last potassium being 3.6 on , and increasing this dosage.  I will be getting repeat basic metabolic panel and BNP in 1 week.  Patient is to continue follow-up with cardiology, and go to the emergency department with worsening symptoms of heart failure.  Patient is also educated on signs and symptoms of hypokalemia and hyperkalemia, and is to go to the ER if these present.  Wt Readings from Last 3 Encounters:   24 101 kg (222 lb 12.8 oz)   24 94.2 kg (207 lb 9.6 oz)   24 98.2 kg (216 lb 6.4 oz)               Orders:  -     potassium chloride (Klor-Con M20) 20 mEq tablet; Take 1 tablet (20 mEq total) by mouth daily  -     Basic metabolic panel; Future; Expected date: 2024  -     B-Type Natriuretic Peptide(BNP); Future; Expected date: 2024    2. Urinary frequency  -     Durable Medical Equipment           Subjective      Patient is a 68-year-old female presenting for ER follow-up for acute on chronic heart failure.  Patient had been on Lasix 40 mg daily prior to this, and was in the hospital with heart failure on .  Patient was then in the ER 2 days ago for the same symptoms.  Patient was given IV diuresis and improved, but has been on Lasix 40 mg since.  Patient states she is starting to get short of breath with mild swelling in her right leg  again.  Denies orthopnea, left leg edema, chest pain, heart palpitations.  Patient had an appointment with cardiology in [place] 1 week ago.  She states an EKG was done and they put a monitor on her chest, and then sent her on her way.  Patient had an echocardiogram back in November that showed an ejection fraction of 55%.  Patient has a cardiology appointment scheduled tomorrow morning.      Review of Systems   Constitutional:  Negative for appetite change, chills, diaphoresis, fatigue and fever.   HENT:  Negative for congestion, ear discharge, ear pain, postnasal drip, rhinorrhea, sinus pressure, sinus pain, sneezing and sore throat.    Eyes:  Negative for pain, discharge, redness, itching and visual disturbance.   Respiratory:  Positive for shortness of breath. Negative for apnea, cough, chest tightness and wheezing.    Cardiovascular:  Positive for leg swelling. Negative for chest pain and palpitations.   Gastrointestinal:  Negative for abdominal pain, blood in stool, constipation, diarrhea, nausea and vomiting.   Endocrine: Negative for cold intolerance, heat intolerance, polydipsia and polyuria.   Genitourinary:  Negative for dysuria, flank pain, frequency, hematuria and urgency.   Musculoskeletal:  Negative for arthralgias, back pain, myalgias, neck pain and neck stiffness.   Skin:  Negative for color change and rash.   Allergic/Immunologic: Negative for environmental allergies and food allergies.   Neurological:  Negative for dizziness, tremors, seizures, syncope, speech difficulty, weakness, light-headedness, numbness and headaches.   Hematological:  Negative for adenopathy. Does not bruise/bleed easily.   Psychiatric/Behavioral:  Negative for agitation, confusion, decreased concentration, dysphoric mood, hallucinations, self-injury, sleep disturbance and suicidal ideas. The patient is not nervous/anxious and is not hyperactive.    All other systems reviewed and are negative.      Current Outpatient  Medications on File Prior to Visit   Medication Sig    albuterol (PROVENTIL HFA,VENTOLIN HFA) 90 mcg/act inhaler Inhale    amLODIPine (NORVASC) 10 mg tablet Take 10 mg by mouth daily    apixaban (ELIQUIS) 5 mg Take 5 mg by mouth 2 (two) times a day    clopidogrel (PLAVIX) 75 mg tablet Take 75 mg by mouth daily    dulaglutide (Trulicity) 1.5 MG/0.5ML injection Inject 0.5 mL (1.5 mg total) under the skin every 7 days    fluticasone (FLONASE) 50 mcg/act nasal spray 2 sprays into each nostril as needed    Fluticasone-Salmeterol (Advair) 250-50 mcg/dose inhaler Inhale 1 puff 2 (two) times a day    furosemide (LASIX) 40 mg tablet Take 1 tablet (40 mg total) by mouth daily    insulin glargine (Toujeo SoloStar) 300 units/mL CONCENTRATED U-300 injection pen (1-unit dial) Inject 52 Units under the skin daily    isosorbide mononitrate (IMDUR) 30 mg 24 hr tablet One by mouth daily    levothyroxine 25 mcg tablet     LORazepam (ATIVAN) 0.5 mg tablet Take 1 tablet (0.5 mg total) by mouth daily at bedtime as needed for anxiety    losartan (COZAAR) 100 MG tablet Take 100 mg by mouth daily    metFORMIN (GLUCOPHAGE-XR) 500 mg 24 hr tablet Take 2 tablets (1,000 mg total) by mouth 2 (two) times a day with meals    nitroglycerin (NITROSTAT) 0.4 mg SL tablet 1 Tab Sublingual Every 5 minutes as needed for chest pain. If pain not resolved after third dose, seek emergency care    pravastatin (PRAVACHOL) 20 mg tablet Take 1 tablet (20 mg total) by mouth every other day    Zetia 10 MG tablet Take 10 mg by mouth daily    carvedilol (COREG) 12.5 mg tablet Take 1 tablet (12.5 mg total) by mouth 2 (two) times a day with meals (Patient taking differently: Take 25 mg by mouth 2 (two) times a day with meals)    [DISCONTINUED] olmesartan-hydrochlorothiazide (BENICAR HCT) 40-25 MG per tablet Take 1 tablet by mouth daily       Objective     /82 (BP Location: Left arm, Patient Position: Sitting)   Pulse 93   Temp 98.3 °F (36.8 °C) (Tympanic)    "Ht 5' 2\" (1.575 m)   Wt 101 kg (222 lb 12.8 oz)   SpO2 96%   BMI 40.75 kg/m²     Physical Exam  Vitals and nursing note reviewed.   Constitutional:       Appearance: Normal appearance. She is normal weight. She is not ill-appearing or toxic-appearing.   HENT:      Head: Normocephalic and atraumatic.      Right Ear: Tympanic membrane normal.      Left Ear: Tympanic membrane normal.      Nose: Nose normal.      Mouth/Throat:      Mouth: Mucous membranes are moist.      Pharynx: Oropharynx is clear.   Eyes:      Extraocular Movements: Extraocular movements intact.      Conjunctiva/sclera: Conjunctivae normal.      Pupils: Pupils are equal, round, and reactive to light.   Cardiovascular:      Rate and Rhythm: Normal rate and regular rhythm.      Pulses: Normal pulses.      Heart sounds: Normal heart sounds. No murmur heard.  Pulmonary:      Effort: Pulmonary effort is normal. No respiratory distress.      Breath sounds: Normal breath sounds. No stridor. No wheezing, rhonchi or rales.   Chest:      Chest wall: No tenderness.   Abdominal:      General: Bowel sounds are normal.      Palpations: Abdomen is soft. There is no mass.      Tenderness: There is no abdominal tenderness.   Musculoskeletal:         General: No tenderness. Normal range of motion.      Cervical back: Normal range of motion and neck supple. No tenderness.      Right lower leg: Edema (2+) present.      Left lower leg: No edema.   Lymphadenopathy:      Cervical: No cervical adenopathy.   Skin:     General: Skin is warm.      Capillary Refill: Capillary refill takes less than 2 seconds.      Findings: No erythema, lesion or rash.   Neurological:      General: No focal deficit present.      Mental Status: She is alert and oriented to person, place, and time. Mental status is at baseline.      Motor: No weakness.      Coordination: Coordination normal.      Gait: Gait normal.   Psychiatric:         Mood and Affect: Mood normal.         Behavior: Behavior " normal.         Thought Content: Thought content normal.         Judgment: Judgment normal.       Gen Patterson PA-C

## 2024-05-01 ENCOUNTER — TELEPHONE (OUTPATIENT)
Dept: FAMILY MEDICINE CLINIC | Facility: CLINIC | Age: 69
End: 2024-05-01

## 2024-05-01 DIAGNOSIS — I50.9 ACUTE ON CHRONIC CONGESTIVE HEART FAILURE, UNSPECIFIED HEART FAILURE TYPE (HCC): Primary | ICD-10-CM

## 2024-05-01 NOTE — TELEPHONE ENCOUNTER
Pt just called to let you know that she cancelled her appt with the cardiologist today.  She asked if you can put in a referral for the cardiologist that you mentioned yesterday at her visit.

## 2024-05-02 ENCOUNTER — TELEPHONE (OUTPATIENT)
Age: 69
End: 2024-05-02

## 2024-05-02 NOTE — TELEPHONE ENCOUNTER
Patient called the RX Refill Line.  Message is being forwarded to the office.     Patient is requesting   A script for Furosemide 20 to also be sent in. Patient stated that her and the doctor discussed taking this in the afternoon along with her 40 she takes daily.    Please contact patient at   [phone]

## 2024-05-05 DIAGNOSIS — I50.9 ACUTE ON CHRONIC CONGESTIVE HEART FAILURE, UNSPECIFIED HEART FAILURE TYPE (HCC): Primary | ICD-10-CM

## 2024-05-05 RX ORDER — FUROSEMIDE 20 MG/1
20 TABLET ORAL 2 TIMES DAILY
Qty: 90 TABLET | Refills: 3 | Status: SHIPPED | OUTPATIENT
Start: 2024-05-05 | End: 2024-05-10 | Stop reason: DRUGHIGH

## 2024-05-06 ENCOUNTER — TELEPHONE (OUTPATIENT)
Age: 69
End: 2024-05-06

## 2024-05-06 NOTE — TELEPHONE ENCOUNTER
Selina from [facility] called requesting for the last office notes. Selina would like this sent to fax: [phone]. Please review and advise

## 2024-05-06 NOTE — TELEPHONE ENCOUNTER
Pt called to see if referral was placed for Cardiologist and needing number to make appt.  Gave Pt the information.

## 2024-05-07 ENCOUNTER — TELEPHONE (OUTPATIENT)
Dept: FAMILY MEDICINE CLINIC | Facility: CLINIC | Age: 69
End: 2024-05-07

## 2024-05-07 NOTE — TELEPHONE ENCOUNTER
----- Message from Gen Patterson PA-C sent at 5/7/2024  6:53 AM EDT -----  BNP is still high.  How are symptoms and did you make an appt with cardiology?

## 2024-05-09 DIAGNOSIS — I50.9 ACUTE ON CHRONIC CONGESTIVE HEART FAILURE, UNSPECIFIED HEART FAILURE TYPE (HCC): Primary | ICD-10-CM

## 2024-05-09 NOTE — TELEPHONE ENCOUNTER
Reason for call: Last filled at the hospital    [x] Refill   [] Prior Auth  [] Other:     Office:   [x] PCP/Provider - Dr Sanchez   [] Specialty/Provider -     Medication: eliquis    Dose/Frequency: 5 mg BID    Quantity: 30D w refills     Pharmacy: [facility] [address] [place] [address] on file     Does the patient have enough for 3 days?   [x] Yes   [] No - Send as HP to POD

## 2024-05-10 ENCOUNTER — CONSULT (OUTPATIENT)
Dept: CARDIOLOGY CLINIC | Facility: CLINIC | Age: 69
End: 2024-05-10
Payer: COMMERCIAL

## 2024-05-10 VITALS
WEIGHT: 216 LBS | OXYGEN SATURATION: 96 % | DIASTOLIC BLOOD PRESSURE: 80 MMHG | HEART RATE: 80 BPM | HEIGHT: 62 IN | BODY MASS INDEX: 39.75 KG/M2 | SYSTOLIC BLOOD PRESSURE: 138 MMHG

## 2024-05-10 DIAGNOSIS — I50.9 ACUTE ON CHRONIC CONGESTIVE HEART FAILURE, UNSPECIFIED HEART FAILURE TYPE (HCC): ICD-10-CM

## 2024-05-10 DIAGNOSIS — I25.10 CORONARY ARTERY DISEASE INVOLVING NATIVE CORONARY ARTERY OF NATIVE HEART WITHOUT ANGINA PECTORIS: ICD-10-CM

## 2024-05-10 DIAGNOSIS — I10 ESSENTIAL (PRIMARY) HYPERTENSION: ICD-10-CM

## 2024-05-10 DIAGNOSIS — I50.33 ACUTE ON CHRONIC HEART FAILURE WITH PRESERVED EJECTION FRACTION (HCC): Primary | ICD-10-CM

## 2024-05-10 DIAGNOSIS — E78.2 MIXED HYPERLIPIDEMIA: ICD-10-CM

## 2024-05-10 DIAGNOSIS — G47.33 OSA (OBSTRUCTIVE SLEEP APNEA): ICD-10-CM

## 2024-05-10 PROCEDURE — 99204 OFFICE O/P NEW MOD 45 MIN: CPT | Performed by: INTERNAL MEDICINE

## 2024-05-10 RX ORDER — FUROSEMIDE 40 MG/1
40 TABLET ORAL 2 TIMES DAILY
Qty: 60 TABLET | Refills: 2 | Status: SHIPPED | OUTPATIENT
Start: 2024-05-10

## 2024-05-10 RX ORDER — SPIRONOLACTONE 25 MG/1
25 TABLET ORAL DAILY
Qty: 30 TABLET | Refills: 2 | Status: SHIPPED | OUTPATIENT
Start: 2024-05-10

## 2024-05-10 NOTE — PATIENT INSTRUCTIONS
Increase furosemide to 40mg two times a day  Start spironolactone 25mg once a day  Obtain BMP in 1 week  Obtain echocardiogram to assess heart function  2g sodium diet   2L fluid diet  Daily weights, call office for weight gain of 3 lbs in a day or 5 lbs in 5-7 days.

## 2024-05-10 NOTE — PROGRESS NOTES
Heart Failure Outpatient Consult Note - Alma Worley 68 y.o. female MRN: [id number]    Encounter: [id number]      Assessment/Plan:    Patient Active Problem List    Diagnosis Date Noted    Urinary frequency 04/30/2024    Atrial fibrillation (HCC) 04/16/2024    Congestive heart failure (HCC) 04/16/2024    Arthritis of right knee 12/07/2023    Morbid obesity due to excess calories ([facility]) 09/01/2023    Chronic obstructive pulmonary disease ([facility]) 06/01/2023    Moderate nonproliferative diabetic retinopathy of right eye without macular edema associated with type 2 diabetes mellitus ([facility]) 06/01/2023    Financial difficulties 06/01/2023    Bilateral sensorineural hearing loss 08/23/2022    Sleep apnea in adult 01/18/2022    Tobacco use disorder 01/18/2022    Colonoscopy refused 03/20/2019    History of kidney stones 02/16/2018    HTN, goal below 140/90 02/16/2018    Hyperlipidemia 02/16/2018    Type 2 diabetes mellitus with hemoglobin A1c goal of less than 7.0% ([facility]) 02/16/2018    CAD (coronary artery disease) 03/25/2014    Obesity, Class II, BMI 35-39.9, isolated (see actual BMI) 03/25/2014    Generalized anxiety disorder 03/24/2014    Hypothyroidism 03/24/2014     # Heart failure with preserved ejection fraction  Echo 11/2023 [facility]: LVEF 55%, no other details available  Echo 3/2024 [facility]: LVEF 60-65%. Mild concentric hypertrophy. Normal RV size and function. No significant valve abnormality  Weight: 216 lbs  NTproBNP 1564 5/6/24  # Persistent atrial fibrillation, diagnosed 4/2024, EKG today still in afib  Rate: coreg  Rhythm: none  AC: apixaban  # CAD, reports heart attacks x 2, 2005 with stent placed and 2013/2014 with in-stent stenosis s/p stenting  Rx: plavix, coreg, statin  # MOON, on CPAP  # Morbid obesity, BMI 39  # COPD  # Tobacco use, quit 4/2024, long standing heavy smoking prior  # Hypertension, above goal, on coreg, losartan and amlodipine  # Hyperlipidemia  5/6/24:  HDL 39   Muscle pains  with statins and only tolerating pravastatin 20mg every other day and zetia  # DM type 2  # Hypothyroidism    Today's Plan:  Volume up on exam  Increase furosemide to 40mg two times a day  Start spironolactone 25mg once a day  Obtain BMP in 1 week  Obtain echocardiogram to assess heart function  2g sodium diet   2L fluid diet  Daily weights, call office for weight gain of 3 lbs in a day or 5 lbs in 5-7 days.      HPI:   68 year old female with PMH of CAD s/p stents, HFpEF, hypertension, hyperlipidemia, MOON and rest as above who is referred for heart failure evaluation and management. She was seen at the ED last month for shortness of breath and deemed to be in heart failure exacerbation. She was started on oral lasix. She was also noted to be in atrial fibrillation. Coreg increased and started on anticoagulation with eliquis. She continues to have shortness of breath and recently seen by PCP and lasix dose increased to 40mg in AM and 20mg in PM. Still with shortness of breath and mild lower extemity edema. She is wearing compression socks. Denies PND. Has 2 pillow orthopnea. Occasional chest and neck pain. Occasional dizziness getting up.   SH: Quit smoking 1 month ago - x 50 years at least 1 ppd; no alcohol or drug use  FH: Mother - with CABG and valve replacement; Father -  at 53 with CHF; Sister- valve, chf afib      Past Medical History:   Diagnosis Date    Anemia     CHF (congestive heart failure) (HCC)     Diabetes type 2, controlled (HCC)     Heart attack (HCC)     Hypertension        Review of Systems   Constitutional:  Negative for chills and fever.   HENT:  Negative for ear pain and sore throat.    Eyes:  Negative for pain and visual disturbance.   Respiratory:  Positive for shortness of breath. Negative for cough.    Cardiovascular:  Positive for leg swelling. Negative for chest pain and palpitations.   Gastrointestinal:  Negative for abdominal distention, abdominal pain and vomiting.   Genitourinary:   Negative for dysuria and hematuria.   Musculoskeletal:  Negative for arthralgias and back pain.   Skin:  Negative for color change and rash.   Neurological:  Positive for dizziness. Negative for seizures, syncope and light-headedness.   All other systems reviewed and are negative.       No Known Allergies  .    Current Outpatient Medications:     albuterol (PROVENTIL HFA,VENTOLIN HFA) 90 mcg/act inhaler, Inhale, Disp: , Rfl:     amLODIPine (NORVASC) 10 mg tablet, Take 10 mg by mouth daily, Disp: , Rfl:     apixaban (ELIQUIS) 5 mg, Take 1 tablet (5 mg total) by mouth 2 (two) times a day, Disp: 180 tablet, Rfl: 1    carvedilol (COREG) 12.5 mg tablet, Take 1 tablet (12.5 mg total) by mouth 2 (two) times a day with meals (Patient taking differently: Take 25 mg by mouth 2 (two) times a day with meals), Disp: 180 tablet, Rfl: 3    clopidogrel (PLAVIX) 75 mg tablet, Take 75 mg by mouth daily, Disp: , Rfl:     dulaglutide (Trulicity) 1.5 MG/0.5ML injection, Inject 0.5 mL (1.5 mg total) under the skin every 7 days, Disp: 6 mL, Rfl: 1    fluticasone (FLONASE) 50 mcg/act nasal spray, 2 sprays into each nostril as needed, Disp: , Rfl:     Fluticasone-Salmeterol (Advair) 250-50 mcg/dose inhaler, Inhale 1 puff 2 (two) times a day, Disp: , Rfl:     furosemide (LASIX) 20 mg tablet, Take 1 tablet (20 mg total) by mouth 2 (two) times a day, Disp: 90 tablet, Rfl: 3    furosemide (LASIX) 40 mg tablet, Take 1 tablet (40 mg total) by mouth daily, Disp: 7 tablet, Rfl: 0    insulin glargine (Toujeo SoloStar) 300 units/mL CONCENTRATED U-300 injection pen (1-unit dial), Inject 52 Units under the skin daily, Disp: 15 mL, Rfl: 1    isosorbide mononitrate (IMDUR) 30 mg 24 hr tablet, One by mouth daily, Disp: , Rfl:     levothyroxine 25 mcg tablet, , Disp: , Rfl:     LORazepam (ATIVAN) 0.5 mg tablet, Take 1 tablet (0.5 mg total) by mouth daily at bedtime as needed for anxiety, Disp: 30 tablet, Rfl: 5    losartan (COZAAR) 100 MG tablet, Take 100  mg by mouth daily, Disp: , Rfl:     metFORMIN (GLUCOPHAGE-XR) 500 mg 24 hr tablet, Take 2 tablets (1,000 mg total) by mouth 2 (two) times a day with meals, Disp: 360 tablet, Rfl: 1    nitroglycerin (NITROSTAT) 0.4 mg SL tablet, 1 Tab Sublingual Every 5 minutes as needed for chest pain. If pain not resolved after third dose, seek emergency care, Disp: , Rfl:     potassium chloride (Klor-Con M20) 20 mEq tablet, Take 1 tablet (20 mEq total) by mouth daily, Disp: 30 tablet, Rfl: 2    pravastatin (PRAVACHOL) 20 mg tablet, Take 1 tablet (20 mg total) by mouth every other day, Disp: 45 tablet, Rfl: 1    Zetia 10 MG tablet, Take 10 mg by mouth daily, Disp: , Rfl:     Social History     Socioeconomic History    Marital status: /Civil Union     Spouse name: Not on file    Number of children: Not on file    Years of education: Not on file    Highest education level: Not on file   Occupational History    Not on file   Tobacco Use    Smoking status: Former     Current packs/day: 0.25     Types: Cigarettes    Smokeless tobacco: Never   Vaping Use    Vaping status: Never Used   Substance and Sexual Activity    Alcohol use: Not Currently    Drug use: Never    Sexual activity: Not on file   Other Topics Concern    Not on file   Social History Narrative    Not on file     Social Determinants of Health     Financial Resource Strain: Low Risk  (9/29/2023)    Overall Financial Resource Strain (CARDIA)     Difficulty of Paying Living Expenses: Not hard at all   Food Insecurity: No Food Insecurity (1/27/2020)    Received from [facility]    Hunger Vital Sign     Worried About Running Out of Food in the Last Year: Never true     Ran Out of Food in the Last Year: Never true   Transportation Needs: No Transportation Needs (9/29/2023)    PRAPARE - Transportation     Lack of Transportation (Medical): No     Lack of Transportation (Non-Medical): No   Physical Activity: Not on file   Stress: Not on file   Social Connections: Not on file  "  Intimate Partner Violence: Not on file   Housing Stability: Not on file       History reviewed. No pertinent family history.    Physical Exam:    Vitals: Blood pressure 138/80, pulse 80, height 5' 2\" (1.575 m), weight 98 kg (216 lb), SpO2 96%., Body mass index is 39.51 kg/m².,   Wt Readings from Last 3 Encounters:   05/10/24 98 kg (216 lb)   04/30/24 101 kg (222 lb 12.8 oz)   04/16/24 94.2 kg (207 lb 9.6 oz)       Physical Exam  Constitutional:       General: She is not in acute distress.     Appearance: Normal appearance.   HENT:      Head: Normocephalic and atraumatic.      Mouth/Throat:      Mouth: Mucous membranes are moist.   Eyes:      Extraocular Movements: Extraocular movements intact.      Conjunctiva/sclera: Conjunctivae normal.   Cardiovascular:      Rate and Rhythm: Normal rate. Rhythm irregularly irregular.      Pulses: Normal pulses.      Heart sounds: No murmur heard.     No friction rub. No gallop.      Comments: Elevated JVP. Trace edema with compression stockings on  Pulmonary:      Effort: Pulmonary effort is normal. No respiratory distress.      Breath sounds: No wheezing, rhonchi or rales.   Abdominal:      General: There is no distension.      Palpations: Abdomen is soft.      Tenderness: There is no abdominal tenderness. There is no guarding.   Musculoskeletal:         General: No deformity or signs of injury.      Cervical back: Neck supple.   Skin:     General: Skin is warm and dry.      Capillary Refill: Capillary refill takes less than 2 seconds.   Neurological:      General: No focal deficit present.      Mental Status: She is alert and oriented to person, place, and time.   Psychiatric:         Mood and Affect: Mood normal.         Labs & Results:    No results found for: \"WBC\", \"HGB\", \"HCT\", \"MCV\", \"PLT\"  Lab Results   Component Value Date    SODIUM 140 05/06/2024    K 4.0 05/06/2024     05/06/2024    CO2 27 05/06/2024    BUN 13 05/06/2024    CREATININE 0.9 05/06/2024    GLUC " "228 (H) 05/06/2024    CALCIUM 8.5 05/06/2024     No results found for: \"NTBNP\"   No results found for: \"CHOLESTEROL\"  No results found for: \"HDL\"  No results found for: \"TRIG\"  No results found for: \"NONHDLC\"    EKG personally reviewed by Rohit Desir MD.     Counseling / Coordination of Care  Time spent today 40 minutes.  Greater than 50% of total time was spent with the patient and / or family counseling and / or coordination of care.  We discussed diagnoses, most recent studies and any changes in treatment    Thank you for the opportunity to participate in the care of this patient.    ROHIT DESIR MD  ADVANCED HEART FAILURE AND MECHANICAL CIRCULATORY SUPPORT  [facility]     "

## 2024-05-13 ENCOUNTER — TELEPHONE (OUTPATIENT)
Age: 69
End: 2024-05-13

## 2024-05-13 NOTE — TELEPHONE ENCOUNTER
Patient was seen in the office on 5/10/24. Patient states that she was prescribed Spironolactone and Furosemide. States that she is on PACE and was told that the medication would cost over $100 and she is unable to afford that. Unsure what she is to do.

## 2024-05-14 NOTE — TELEPHONE ENCOUNTER
Spoke to pharmacy resolved issue with medication.  Spironolactone was picked up by patient with $5 copay.  Furosemide needed to resubmitted as there was a dosage change and will be available for  today at no cost.    I will START or STAY ON the medications listed below when I get home from the hospital:  None

## 2024-05-21 ENCOUNTER — CLINICAL SUPPORT (OUTPATIENT)
Dept: FAMILY MEDICINE CLINIC | Facility: CLINIC | Age: 69
End: 2024-05-21

## 2024-05-21 DIAGNOSIS — E11.9 TYPE 2 DIABETES MELLITUS WITH HEMOGLOBIN A1C GOAL OF LESS THAN 7.0% (HCC): Primary | ICD-10-CM

## 2024-05-21 NOTE — PROGRESS NOTES
[facility]  Suzy Garcia, Pharmacist    Alma Worley is a 68 y.o. female who was referred to the pharmacist for T2DM education and management, referred by Reece Sanchez MD .      Telemedicine consent  The patient was identified by name and date of birth. Alma Worley was informed that this is a telemedicine visit and that the visit is being conducted through Telephone.  My office door was closed. No one else was in the room.  She acknowledged consent and understanding of privacy and security of the video platform. The patient has agreed to participate and understands they can discontinue the visit at any time.    Assessment/ Plan       Type 2 Diabetes:  Goal A1c <7% based on ADA Guidelines. Most recent A1c   Lab Results   Component Value Date    HGBA1C 7.9 (H) 02/19/2024      Self monitoring blood glucose  Still not monitoring home glucose levels. Confirmed she does have testing supplies at home but she has just been overwhelmed by new health issues (HF)   Complications:  Microvascular complications: retinopathy   Macrovascular complications: CAD  Current Diabetes Regimen:  Trulicity 0.75 mg (never increased to 1.5 mg due to supply)   Toujeo 52 units  Metformin  mg 2 tabs BID    Changes to Medication Regimen:   If PCP is in agreement with plan  Patient still cannot get Trulicity 1.5 mg still from pharmacy due to back orders. She has used up her current supply of 0.75 mg weekly  At this time will switch Trulicity to Ozempic due to medication availability. Start at Ozempic 0.25 mg weekly.  Start testing blood sugars readings again.     2. On Additional Therapies:  Statin: yes  ACEI/ARB: no  ASA: no    3. Hyperlipidemia with clinical ASCVD event:   2018 ACC/AHA lipid guidelines recommend high statin. LDL goal <55 per 2024 ADA Standards of care..   Patient currently on low intensity and tolerating well without side effects. Intolerant to higher doses of statin  Last  lipid panel elevated. Zetia was just started. Repeat lipid panel in 3 months      4. HTN:   BP goal less than 130/80 mmHg.   In office BP above goal, HR acceptable.   Continue to monitor     Referrals placed at this visit: None    Follow-up: 3 weeks    Subjective     Medication Adherence/ Tolerability:  Trulicity 0.75 mg- never increased  to 1.5 mg weekly due to availability at pharmacy. She had extra 0.75 mg weekly dose at home but this has been used up  Toujeo 52 units daily   Metformin  mg - 2 tabs BID    Medications Tried in Past:      2. Lifestyle:       3. Cost  Approved for [employer]    4. Home monitoring devices  Glucometer: Yes, Brand:   CGM: No, Brand:     Objective     Lab Results   Component Value Date    HGBA1C 7.9 (H) 02/19/2024    HGBA1C 7.9 02/19/2024    HGBA1C 7.1 (A) 09/01/2023       Pharmacist Tracking Tool     Pharmacist Tracking Tool  Reason For Outreach: Embedded Pharmacist  Demographics:  Intervention Method: Phone  Type of Intervention: Follow-Up  Topics Addressed: Diabetes and Dyslipidemia  Pharmacologic Interventions: Medication Conversion  Non-Pharmacologic Interventions: Disease state education and Medication/Device education  Time:  Direct Patient Care:  15  mins  Care Coordination:  15  mins  Recommendation Recipient: Patient/Caregiver and Provider  Outcome: Accepted

## 2024-06-06 ENCOUNTER — RA CDI HCC (OUTPATIENT)
Dept: OTHER | Facility: HOSPITAL | Age: 69
End: 2024-06-06

## 2024-06-06 DIAGNOSIS — I10 HTN, GOAL BELOW 140/90: ICD-10-CM

## 2024-06-06 RX ORDER — CARVEDILOL 12.5 MG/1
12.5 TABLET ORAL 2 TIMES DAILY WITH MEALS
Qty: 180 TABLET | Refills: 1 | Status: SHIPPED | OUTPATIENT
Start: 2024-06-06

## 2024-06-08 ENCOUNTER — RA CDI HCC (OUTPATIENT)
Dept: OTHER | Facility: HOSPITAL | Age: 69
End: 2024-06-08

## 2024-06-11 ENCOUNTER — TELEPHONE (OUTPATIENT)
Dept: FAMILY MEDICINE CLINIC | Facility: CLINIC | Age: 69
End: 2024-06-11

## 2024-06-11 ENCOUNTER — CLINICAL SUPPORT (OUTPATIENT)
Dept: FAMILY MEDICINE CLINIC | Facility: CLINIC | Age: 69
End: 2024-06-11

## 2024-06-11 DIAGNOSIS — E11.9 TYPE 2 DIABETES MELLITUS WITH HEMOGLOBIN A1C GOAL OF LESS THAN 7.0% (HCC): ICD-10-CM

## 2024-06-11 DIAGNOSIS — I10 HTN, GOAL BELOW 140/90: ICD-10-CM

## 2024-06-11 RX ORDER — CARVEDILOL 25 MG/1
25 TABLET ORAL 2 TIMES DAILY WITH MEALS
Qty: 180 TABLET | Refills: 1 | Status: SHIPPED | OUTPATIENT
Start: 2024-06-11

## 2024-06-11 NOTE — PROGRESS NOTES
[facility] Pharm[facility] Services  Suzy Garcia, Pharmacist    Alma Worley is a 68 y.o. female who was referred to the pharmacist for T2DM education and management, referred by Reece Sanchez MD .      Telemedicine consent  The patient was identified by name and date of birth. Alma Worley was informed that this is a telemedicine visit and that the visit is being conducted through Telephone.  My office door was closed. No one else was in the room.  She acknowledged consent and understanding of privacy and security of the video platform. The patient has agreed to participate and understands they can discontinue the visit at any time.    Assessment/ Plan       Type 2 Diabetes:  Goal A1c <7% based on ADA Guidelines. Most recent A1c   Lab Results   Component Value Date    HGBA1C 7.9 (H) 02/19/2024      Self monitoring blood glucose  Readings range from 110 - 157 mg/dL  Complications:  Microvascular complications: retinopathy   Macrovascular complications: CAD  Current Diabetes Regimen:  Ozempic 0.25 mg weekly  Toujeo 52 units  Metformin  mg 2 tabs BID    Changes to Medication Regimen:   If PCP is in agreement with plan  Tolerating Ozempic 0.25 mg weekly. Increase to Ozempic 0.5 mg weekly on 6/2024  Patient has been taking Coreg 12.5 mg 2 tabs (25 mg ) BID as instructed per ED visit on 4/28/24. Last refill was sent in for lower dose. Rx sent for 25 mg tabs and instructed patient to take 1 tab BID.     2. On Additional Therapies:  Statin: yes  ACEI/ARB: no  ASA: no    3. Hyperlipidemia with clinical ASCVD event:   2018 ACC/AHA lipid guidelines recommend high statin. LDL goal <55 per 2024 ADA Standards of care..   Patient currently on low intensity and tolerating well without side effects. Intolerant to higher doses of statin  Last lipid panel elevated. Zetia was just started. Repeat lipid panel in 3 months      4. HTN:   BP goal less than 130/80 mmHg.   In office BP above goal, HR acceptable.    Continue to monitor     Referrals placed at this visit: None    Follow-up: 4 weeks    Subjective     Medication Adherence/ Tolerability:  Ozempic 0.25 mg weekly- started 3 weeks ago. 4th injection will be this Thursday. Some nausea since starting but tolerable.   Toujeo 52 units daily   Metformin  mg - 2 tabs BID    Medications Tried in Past:      2. Lifestyle:       3. Cost  Approved for [employer]    4. Home monitoring devices  Glucometer: Yes, Brand:   CGM: No, Brand:     Objective     Lab Results   Component Value Date    HGBA1C 7.9 (H) 02/19/2024    HGBA1C 7.9 02/19/2024    HGBA1C 7.1 (A) 09/01/2023       Blood Glucose Readings  The patient is currently checking blood glucose 0-1 times per day. Patient reports with SMBG logs.    Date AM Post-Breakfast Lunch Post-Lunch Dinner Post-Dinner Comments                                                                                             6/9 157 138        6/10 130    110               Avg              Pharmacist Tracking Tool     Pharmacist Tracking Tool  Reason For Outreach: Embedded Pharmacist  Demographics:  Intervention Method: Phone  Type of Intervention: Follow-Up  Topics Addressed: Diabetes and Dyslipidemia  Pharmacologic Interventions: Dose or Frequency Adjusted and Med Rec  Non-Pharmacologic Interventions: Disease state education and Medication/Device education  Time:  Direct Patient Care:  20  mins  Care Coordination:  5  mins  Recommendation Recipient: Patient/Caregiver and Provider  Outcome: Accepted

## 2024-06-13 NOTE — TELEPHONE ENCOUNTER
Received another prior auth request for Ozempic but different key attached. Scanned to chart    Key: [id number]

## 2024-06-14 NOTE — TELEPHONE ENCOUNTER
PA for Ozempic, 0.25 or 0.5 MG/DOSE     Submitted via    []CMM-KEY   []Surescripts-Case ID #   []Faxed to plan   [x]Other website [facility] prompt josselyn Prior Auth (EOC) ID:  [id number]    []Phone call Case ID #     Office notes sent, clinical questions answered. Awaiting determination    Turnaround time for your insurance to make a decision on your Prior Authorization can take 7-21 business days.

## 2024-06-14 NOTE — TELEPHONE ENCOUNTER
Medication Ozempic has been approved via [employer] [facility] called to state that.

## 2024-06-16 NOTE — TELEPHONE ENCOUNTER
PA for oZEMPIC 0.25 MG Approved     Date(s) approved 06/14/2024 UNTIL FURTHER NOTICE    Case #    Patient advised by          [] MyChart Message  [x] Phone call   []LMOM  []L/M to call office as no active Communication consent on file  []Unable to leave detailed message as VM not approved on Communication consent       Pharmacy advised by    [x]Fax  []Phone call    Approval letter scanned into Media Yes

## 2024-06-17 ENCOUNTER — OFFICE VISIT (OUTPATIENT)
Dept: FAMILY MEDICINE CLINIC | Facility: CLINIC | Age: 69
End: 2024-06-17
Payer: COMMERCIAL

## 2024-06-17 VITALS
HEART RATE: 80 BPM | BODY MASS INDEX: 37.91 KG/M2 | HEIGHT: 62 IN | OXYGEN SATURATION: 97 % | SYSTOLIC BLOOD PRESSURE: 127 MMHG | WEIGHT: 206 LBS | DIASTOLIC BLOOD PRESSURE: 65 MMHG

## 2024-06-17 DIAGNOSIS — E11.3391 MODERATE NONPROLIFERATIVE DIABETIC RETINOPATHY OF RIGHT EYE WITHOUT MACULAR EDEMA ASSOCIATED WITH TYPE 2 DIABETES MELLITUS (HCC): ICD-10-CM

## 2024-06-17 DIAGNOSIS — Z12.4 SCREENING FOR CERVICAL CANCER: ICD-10-CM

## 2024-06-17 DIAGNOSIS — I50.9 ACUTE ON CHRONIC CONGESTIVE HEART FAILURE, UNSPECIFIED HEART FAILURE TYPE (HCC): ICD-10-CM

## 2024-06-17 DIAGNOSIS — I10 HTN, GOAL BELOW 140/90: Primary | ICD-10-CM

## 2024-06-17 DIAGNOSIS — E11.9 TYPE 2 DIABETES MELLITUS WITH HEMOGLOBIN A1C GOAL OF LESS THAN 7.0% (HCC): ICD-10-CM

## 2024-06-17 DIAGNOSIS — E78.2 MIXED HYPERLIPIDEMIA: ICD-10-CM

## 2024-06-17 DIAGNOSIS — Z78.0 ENCOUNTER FOR OSTEOPOROSIS SCREENING IN ASYMPTOMATIC POSTMENOPAUSAL PATIENT: ICD-10-CM

## 2024-06-17 DIAGNOSIS — I48.11 LONGSTANDING PERSISTENT ATRIAL FIBRILLATION (HCC): ICD-10-CM

## 2024-06-17 DIAGNOSIS — Z12.11 COLON CANCER SCREENING: ICD-10-CM

## 2024-06-17 DIAGNOSIS — Z23 NEED FOR SHINGLES VACCINE: ICD-10-CM

## 2024-06-17 DIAGNOSIS — F33.9 DEPRESSION, RECURRENT (HCC): ICD-10-CM

## 2024-06-17 DIAGNOSIS — Z13.820 ENCOUNTER FOR OSTEOPOROSIS SCREENING IN ASYMPTOMATIC POSTMENOPAUSAL PATIENT: ICD-10-CM

## 2024-06-17 DIAGNOSIS — R11.0 NAUSEA: ICD-10-CM

## 2024-06-17 DIAGNOSIS — Z29.11 NEED FOR PROPHYLACTIC VACCINATION AND INOCULATION AGAINST RESPIRATORY SYNCYTIAL VIRUS (RSV): ICD-10-CM

## 2024-06-17 DIAGNOSIS — E66.01 MORBID OBESITY DUE TO EXCESS CALORIES (HCC): ICD-10-CM

## 2024-06-17 DIAGNOSIS — Z12.31 ENCOUNTER FOR SCREENING MAMMOGRAM FOR BREAST CANCER: ICD-10-CM

## 2024-06-17 DIAGNOSIS — M48.9 CERVICAL SPINE DISEASE: ICD-10-CM

## 2024-06-17 DIAGNOSIS — G47.33 OSA (OBSTRUCTIVE SLEEP APNEA): ICD-10-CM

## 2024-06-17 DIAGNOSIS — J44.9 CHRONIC OBSTRUCTIVE PULMONARY DISEASE, UNSPECIFIED COPD TYPE (HCC): ICD-10-CM

## 2024-06-17 PROBLEM — E23.2 DIABETES INSIPIDUS (HCC): Status: ACTIVE | Noted: 2024-06-17

## 2024-06-17 LAB — SL AMB POCT HEMOGLOBIN AIC: 7.8 (ref ?–6.5)

## 2024-06-17 PROCEDURE — 99214 OFFICE O/P EST MOD 30 MIN: CPT | Performed by: FAMILY MEDICINE

## 2024-06-17 PROCEDURE — 83036 HEMOGLOBIN GLYCOSYLATED A1C: CPT | Performed by: FAMILY MEDICINE

## 2024-06-17 RX ORDER — FLUTICASONE PROPIONATE AND SALMETEROL 250; 50 UG/1; UG/1
1 POWDER RESPIRATORY (INHALATION) 2 TIMES DAILY
Qty: 60 BLISTER | Refills: 5 | Status: SHIPPED | OUTPATIENT
Start: 2024-06-17

## 2024-06-17 RX ORDER — ONDANSETRON 4 MG/1
4 TABLET, FILM COATED ORAL EVERY 8 HOURS PRN
Qty: 20 TABLET | Refills: 0 | Status: SHIPPED | OUTPATIENT
Start: 2024-06-17

## 2024-06-17 RX ORDER — ZOSTER VACCINE RECOMBINANT, ADJUVANTED 50 MCG/0.5
0.5 KIT INTRAMUSCULAR ONCE
Qty: 1 EACH | Refills: 1 | Status: SHIPPED | OUTPATIENT
Start: 2024-06-17 | End: 2024-06-17

## 2024-06-17 NOTE — PROGRESS NOTES
Ambulatory Visit  Name: Alma Worley      : 1955      MRN: [id number]  Encounter Provider: Reece Sanchez MD  Encounter Date: 2024   Encounter department: [facility]    Assessment & Plan   1. HTN, goal below 140/90  2. Screening for cervical cancer  -     Ambulatory referral to Obstetrics / Gynecology; Future  3. Encounter for screening mammogram for breast cancer  -     Mammo screening bilateral w 3d & cad; Future; Expected date: 2024  4. Encounter for osteoporosis screening in asymptomatic postmenopausal patient  -     DXA bone density spine hip and pelvis; Future; Expected date: 2024  5. Colon cancer screening  -     Ambulatory Referral to Gastroenterology; Future  6. Depression, recurrent ([facility])  7. Type 2 diabetes mellitus with hemoglobin A1c goal of less than 7.0% ([facility])  -     POCT hemoglobin A1c  8. Nausea  -     ondansetron (ZOFRAN) 4 mg tablet; Take 1 tablet (4 mg total) by mouth every 8 (eight) hours as needed for nausea or vomiting  9. Chronic obstructive pulmonary disease, unspecified COPD type ([facility])  -     Fluticasone-Salmeterol (Advair) 250-50 mcg/dose inhaler; Inhale 1 puff 2 (two) times a day  10. Longstanding persistent atrial fibrillation ([facility])  11. Acute on chronic congestive heart failure, unspecified heart failure type ([facility])  12. 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 Mixed hyperlipidemia  14. Morbid obesity due to excess calories ([facility])  15. Need for prophylactic vaccination and inoculation against respiratory syncytial virus (RSV)  -     RSV Pre-Fusion F A&B Vac Rcmb (Abrysvo) SOLR vaccine; Inject 0.5 mL into a muscle once for 1 dose  16. Need for shingles vaccine  -     Zoster Vac Recomb Adjuvanted (Shingrix) 50 MCG/0.5ML SUSR; Inject 0.5 mL into a muscle once for 1 dose Repeat dose in 2 to 6 months  17. Cervical spine disease  -     XR spine  "cervical complete 6+ vw flex/ext/obl; Future; Expected date: 06/17/2024  18. MOON (obstructive sleep apnea)  -     Cpap DME       History of Present Illness     She is doing well.    Her A1c is improving.  She has no side effects or hypoglycemia.      Her lipids are at goal on her current regimen.            Review of Systems   All other systems reviewed and are negative.      Objective     /65 (BP Location: Left arm, Patient Position: Sitting, Cuff Size: Large)   Pulse 80   Ht 5' 2\" (1.575 m)   Wt 93.4 kg (206 lb)   SpO2 97%   BMI 37.68 kg/m²     Physical Exam  Vitals and nursing note reviewed.   Constitutional:       Appearance: Normal appearance. She is obese.   Cardiovascular:      Rate and Rhythm: Normal rate and regular rhythm.      Pulses: Normal pulses.      Heart sounds: Normal heart sounds.   Pulmonary:      Effort: Pulmonary effort is normal.      Breath sounds: Normal breath sounds.   Abdominal:      General: Abdomen is flat. Bowel sounds are normal.      Palpations: Abdomen is soft.   Musculoskeletal:         General: Normal range of motion.      Cervical back: Normal range of motion and neck supple.   Skin:     General: Skin is warm and dry.      Capillary Refill: Capillary refill takes less than 2 seconds.   Neurological:      General: No focal deficit present.      Mental Status: She is alert and oriented to person, place, and time. Mental status is at baseline.   Psychiatric:         Mood and Affect: Mood normal.         Behavior: Behavior normal.         Thought Content: Thought content normal.         Judgment: Judgment normal.       Administrative Statements         Depression Screening Follow-up Plan: Patient's depression screening was positive with a PHQ-2 score of 4. Their PHQ-9 score was 9. Patient assessed for underlying major depression. They have no active suicidal ideations. Brief counseling provided and recommend additional follow-up/re-evaluation next office visit.  "

## 2024-06-18 ENCOUNTER — TELEPHONE (OUTPATIENT)
Age: 69
End: 2024-06-18

## 2024-06-18 DIAGNOSIS — E11.9 TYPE 2 DIABETES MELLITUS WITH HEMOGLOBIN A1C GOAL OF LESS THAN 7.0% (HCC): Primary | ICD-10-CM

## 2024-06-18 RX ORDER — BLOOD SUGAR DIAGNOSTIC
STRIP MISCELLANEOUS
Qty: 100 EACH | Refills: 3 | Status: SHIPPED | OUTPATIENT
Start: 2024-06-18

## 2024-06-18 RX ORDER — BLOOD-GLUCOSE METER
KIT MISCELLANEOUS
Qty: 1 KIT | Refills: 0 | Status: SHIPPED | OUTPATIENT
Start: 2024-06-18

## 2024-06-18 RX ORDER — LANCETS 33 GAUGE
EACH MISCELLANEOUS
Qty: 100 EACH | Refills: 3 | Status: SHIPPED | OUTPATIENT
Start: 2024-06-18

## 2024-06-18 NOTE — TELEPHONE ENCOUNTER
Lucero  RN Case Manager from [employer], called requesting a new glucose monitor for the patient. The patient's monitor is broken and she is unable to read the screen. She is requesting a new order faxed to the [employer] below:    [employer]  Fax# [phone]

## 2024-06-27 ENCOUNTER — HOSPITAL ENCOUNTER (OUTPATIENT)
Dept: NON INVASIVE DIAGNOSTICS | Facility: HOSPITAL | Age: 69
Discharge: HOME | End: 2024-06-27
Attending: INTERNAL MEDICINE
Payer: COMMERCIAL

## 2024-06-27 ENCOUNTER — TELEPHONE (OUTPATIENT)
Dept: FAMILY MEDICINE CLINIC | Facility: CLINIC | Age: 69
End: 2024-06-27

## 2024-06-27 VITALS
HEIGHT: 62 IN | HEART RATE: 92 BPM | DIASTOLIC BLOOD PRESSURE: 68 MMHG | BODY MASS INDEX: 37.89 KG/M2 | SYSTOLIC BLOOD PRESSURE: 118 MMHG | WEIGHT: 205.91 LBS

## 2024-06-27 DIAGNOSIS — I50.9 ACUTE ON CHRONIC CONGESTIVE HEART FAILURE, UNSPECIFIED HEART FAILURE TYPE (HCC): ICD-10-CM

## 2024-06-27 LAB
AORTIC ROOT: 3.5 CM
ASCENDING AORTA: 3.6 CM
AV LVOT MEAN GRADIENT: 1 MMHG
AV LVOT PEAK GRADIENT: 3 MMHG
BSA FOR ECHO PROCEDURE: 1.94 M2
DOP CALC LVOT PEAK VEL VTI: 14.67 CM
DOP CALC LVOT PEAK VEL: 0.84 M/S
E WAVE DECELERATION TIME: 176 MS
FRACTIONAL SHORTENING: 25 (ref 28–44)
INTERVENTRICULAR SEPTUM IN DIASTOLE (PARASTERNAL SHORT AXIS VIEW): 1.3 CM
INTERVENTRICULAR SEPTUM: 1.3 CM (ref 0.6–1.1)
LAAS-AP2: 28 CM2
LAAS-AP4: 31.2 CM2
LEFT ATRIUM SIZE: 4.7 CM
LEFT ATRIUM VOLUME (MOD BIPLANE): 109 ML
LEFT ATRIUM VOLUME INDEX (MOD BIPLANE): 56.2 ML/M2
LEFT INTERNAL DIMENSION IN SYSTOLE: 4.1 CM (ref 2.1–4)
LEFT VENTRICULAR INTERNAL DIMENSION IN DIASTOLE: 5.5 CM (ref 3.5–6)
LEFT VENTRICULAR POSTERIOR WALL IN END DIASTOLE: 1.3 CM
LEFT VENTRICULAR STROKE VOLUME: 70 ML
LVSV (TEICH): 70 ML
MV E'TISSUE VEL-LAT: 12 CM/S
MV E'TISSUE VEL-SEP: 9 CM/S
MV PEAK E VEL: 80 CM/S
MV STENOSIS PRESSURE HALF TIME: 51 MS
MV VALVE AREA P 1/2 METHOD: 4.31
PULM VEIN S/D RATIO: 2
PV PEAK D VEL: 0.09 M/S
PV PEAK S VEL: 0.18 M/S
RA PRESSURE ESTIMATED: 5 MMHG
RIGHT ATRIAL 2D VOLUME: 36 ML
RIGHT ATRIUM AREA SYSTOLE A4C: 15.6 CM2
RIGHT VENTRICLE ID DIMENSION: 2.4 CM
RV PSP: 23 MMHG
SL CV LEFT ATRIUM LENGTH A2C: 6.5 CM
SL CV LV EF: 65
SL CV PED ECHO LEFT VENTRICLE DIASTOLIC VOLUME (MOD BIPLANE) 2D: 146 ML
SL CV PED ECHO LEFT VENTRICLE SYSTOLIC VOLUME (MOD BIPLANE) 2D: 76 ML
TR MAX PG: 18 MMHG
TR PEAK VELOCITY: 2.2 M/S
TRICUSPID ANNULAR PLANE SYSTOLIC EXCURSION: 2.6 CM
TRICUSPID VALVE PEAK E WAVE VELOCITY: 0.17 M/S
TRICUSPID VALVE PEAK REGURGITATION VELOCITY: 2.15 M/S

## 2024-06-27 PROCEDURE — 93306 TTE W/DOPPLER COMPLETE: CPT

## 2024-06-27 PROCEDURE — 93306 TTE W/DOPPLER COMPLETE: CPT | Performed by: INTERNAL MEDICINE

## 2024-06-28 NOTE — TELEPHONE ENCOUNTER
Patient called and would like to know the results of the blood work and the xray.  I do not see the xray has come back.  As soon as we have them call her on both. [phone]

## 2024-07-02 ENCOUNTER — TELEPHONE (OUTPATIENT)
Dept: FAMILY MEDICINE CLINIC | Facility: CLINIC | Age: 69
End: 2024-07-02

## 2024-07-03 NOTE — TELEPHONE ENCOUNTER
Called pt and she would like to know where the arthritis in in her body because that is not where she 's having pain.  She is having pain in her shoulder and her lower back.  She also said she would like her labs looked over & wants to know her BNP level.  Please advise

## 2024-07-17 DIAGNOSIS — E03.9 HYPOTHYROIDISM, UNSPECIFIED TYPE: Primary | ICD-10-CM

## 2024-07-17 RX ORDER — LEVOTHYROXINE SODIUM 0.03 MG/1
25 TABLET ORAL DAILY
Qty: 100 TABLET | Refills: 0 | Status: SHIPPED | OUTPATIENT
Start: 2024-07-17

## 2024-07-17 NOTE — TELEPHONE ENCOUNTER
Reason for call:   [x] Refill   [] Prior Auth  [] Other:     Office:   [x] PCP/Provider -   [] Specialty/Provider -         Does the patient have enough for 3 days?   [] Yes   [x] No - Send as HP to POD

## 2024-07-24 DIAGNOSIS — I10 HTN, GOAL BELOW 140/90: Primary | ICD-10-CM

## 2024-07-24 RX ORDER — AMLODIPINE BESYLATE 10 MG/1
10 TABLET ORAL DAILY
Qty: 30 TABLET | Refills: 5 | Status: SHIPPED | OUTPATIENT
Start: 2024-07-24

## 2024-07-24 NOTE — TELEPHONE ENCOUNTER
Reason for call:   [x] Refill   [] Prior Auth  [] Other:     Office:   [x] PCP/Provider -   [] Specialty/Provider -     Medication: amLODIPine (NORVASC) 10 mg tablet Take 10 mg by mouth daily     Pharmacy: [id number] [address]      Does the patient have enough for 3 days?   [x] Yes   [] No - Send as HP to POD

## 2024-07-31 DIAGNOSIS — E11.9 TYPE 2 DIABETES MELLITUS WITH HEMOGLOBIN A1C GOAL OF LESS THAN 7.0% (HCC): ICD-10-CM

## 2024-07-31 RX ORDER — INSULIN GLARGINE 300 U/ML
52 INJECTION, SOLUTION SUBCUTANEOUS DAILY
Qty: 15 ML | Refills: 1 | Status: SHIPPED | OUTPATIENT
Start: 2024-07-31

## 2024-08-02 DIAGNOSIS — I50.9 ACUTE ON CHRONIC CONGESTIVE HEART FAILURE, UNSPECIFIED HEART FAILURE TYPE (HCC): ICD-10-CM

## 2024-08-02 RX ORDER — POTASSIUM CHLORIDE 20 MEQ/1
20 TABLET, EXTENDED RELEASE ORAL DAILY
Qty: 30 TABLET | Refills: 5 | Status: SHIPPED | OUTPATIENT
Start: 2024-08-02

## 2024-08-02 NOTE — TELEPHONE ENCOUNTER
Reason for call:   [x] Refill   [] Prior Auth  [] Other:     Office:   [x] PCP/Provider - Leonardo  [] Specialty/Provider -     Medication: Potassium chloride 20mEq    Dose/Frequency: 1 tab daily    Quantity: 30    Pharmacy: [facility] [address] [place] [address] [address] [place] [address] [phone]     Does the patient have enough for 3 days?   [] Yes   [] No - Send as HP to POD

## 2024-08-23 DIAGNOSIS — I50.9 ACUTE ON CHRONIC CONGESTIVE HEART FAILURE, UNSPECIFIED HEART FAILURE TYPE (HCC): ICD-10-CM

## 2024-08-23 RX ORDER — SPIRONOLACTONE 25 MG/1
25 TABLET ORAL DAILY
Qty: 30 TABLET | Refills: 5 | Status: SHIPPED | OUTPATIENT
Start: 2024-08-23

## 2024-09-17 ENCOUNTER — OFFICE VISIT (OUTPATIENT)
Dept: FAMILY MEDICINE CLINIC | Facility: CLINIC | Age: 69
End: 2024-09-17
Payer: COMMERCIAL

## 2024-09-17 VITALS
TEMPERATURE: 97.6 F | OXYGEN SATURATION: 97 % | DIASTOLIC BLOOD PRESSURE: 70 MMHG | HEIGHT: 62 IN | HEART RATE: 80 BPM | WEIGHT: 210.2 LBS | SYSTOLIC BLOOD PRESSURE: 106 MMHG | BODY MASS INDEX: 38.68 KG/M2

## 2024-09-17 DIAGNOSIS — R07.9 CHEST PAIN, UNSPECIFIED TYPE: ICD-10-CM

## 2024-09-17 DIAGNOSIS — M54.50 CHRONIC BILATERAL LOW BACK PAIN WITHOUT SCIATICA: ICD-10-CM

## 2024-09-17 DIAGNOSIS — E11.3391 MODERATE NONPROLIFERATIVE DIABETIC RETINOPATHY OF RIGHT EYE WITHOUT MACULAR EDEMA ASSOCIATED WITH TYPE 2 DIABETES MELLITUS (HCC): ICD-10-CM

## 2024-09-17 DIAGNOSIS — I10 HTN, GOAL BELOW 140/90: ICD-10-CM

## 2024-09-17 DIAGNOSIS — E66.01 MORBID OBESITY DUE TO EXCESS CALORIES (HCC): ICD-10-CM

## 2024-09-17 DIAGNOSIS — Z12.31 SCREENING MAMMOGRAM FOR BREAST CANCER: ICD-10-CM

## 2024-09-17 DIAGNOSIS — G47.33 OSA (OBSTRUCTIVE SLEEP APNEA): ICD-10-CM

## 2024-09-17 DIAGNOSIS — J44.9 CHRONIC OBSTRUCTIVE PULMONARY DISEASE, UNSPECIFIED COPD TYPE (HCC): ICD-10-CM

## 2024-09-17 DIAGNOSIS — Z12.11 COLON CANCER SCREENING: ICD-10-CM

## 2024-09-17 DIAGNOSIS — R13.10 DYSPHAGIA, UNSPECIFIED TYPE: ICD-10-CM

## 2024-09-17 DIAGNOSIS — I50.9 ACUTE ON CHRONIC CONGESTIVE HEART FAILURE, UNSPECIFIED HEART FAILURE TYPE (HCC): ICD-10-CM

## 2024-09-17 DIAGNOSIS — Z13.820 SCREENING FOR OSTEOPOROSIS: ICD-10-CM

## 2024-09-17 DIAGNOSIS — G89.29 CHRONIC BILATERAL LOW BACK PAIN WITHOUT SCIATICA: ICD-10-CM

## 2024-09-17 DIAGNOSIS — E03.9 HYPOTHYROIDISM, UNSPECIFIED TYPE: ICD-10-CM

## 2024-09-17 DIAGNOSIS — I25.10 CORONARY ARTERY DISEASE INVOLVING NATIVE HEART WITHOUT ANGINA PECTORIS, UNSPECIFIED VESSEL OR LESION TYPE: ICD-10-CM

## 2024-09-17 DIAGNOSIS — G89.29 CHRONIC BILATERAL THORACIC BACK PAIN: ICD-10-CM

## 2024-09-17 DIAGNOSIS — M54.6 CHRONIC BILATERAL THORACIC BACK PAIN: ICD-10-CM

## 2024-09-17 DIAGNOSIS — I48.11 LONGSTANDING PERSISTENT ATRIAL FIBRILLATION (HCC): Primary | ICD-10-CM

## 2024-09-17 DIAGNOSIS — F33.9 DEPRESSION, RECURRENT (HCC): ICD-10-CM

## 2024-09-17 DIAGNOSIS — E11.9 TYPE 2 DIABETES MELLITUS WITH HEMOGLOBIN A1C GOAL OF LESS THAN 7.0% (HCC): ICD-10-CM

## 2024-09-17 PROBLEM — E23.2 DIABETES INSIPIDUS (HCC): Status: RESOLVED | Noted: 2024-06-17 | Resolved: 2024-09-17

## 2024-09-17 PROCEDURE — 99214 OFFICE O/P EST MOD 30 MIN: CPT

## 2024-09-17 PROCEDURE — G2211 COMPLEX E/M VISIT ADD ON: HCPCS

## 2024-09-17 NOTE — ASSESSMENT & PLAN NOTE
At goal today.  Continue current management.  Referred to cardiology.  Orders:    Ambulatory Referral to Cardiology; Future

## 2024-09-17 NOTE — ASSESSMENT & PLAN NOTE
Will follow-up pending imaging results.  Offered spine/pain management referral and PT referral.  Patient would like to see imaging results first.  Orders:    XR spine thoracic 2 vw; Future

## 2024-09-17 NOTE — ASSESSMENT & PLAN NOTE
Anticoagulated on Eliquis and rate controlled on carvedilol.  Due to extensive cardiac history, she does need to follow-up with cardiology.  Referral given again today.  Orders:    Ambulatory Referral to Cardiology; Future

## 2024-09-17 NOTE — ASSESSMENT & PLAN NOTE
Continue levothyroxine.  Will follow-up pending lab results.  Orders:    TSH, 3rd generation with Free T4 reflex; Future    T4; Future    T3; Future

## 2024-09-17 NOTE — ASSESSMENT & PLAN NOTE
Will get CT of the chest.  Does have degenerative changes on x-ray.  Was going to get stress test but patient does not tolerate either type.  Orders:    CT chest wo contrast; Future

## 2024-09-17 NOTE — ASSESSMENT & PLAN NOTE
Continue follow-up with pulmonary.  Continue Advair and albuterol as needed.  Not in acute exacerbation.  Contact office if that acute exacerbation occurs.

## 2024-09-17 NOTE — ASSESSMENT & PLAN NOTE
More with solid foods.  Will refer to GI get barium swallow.  Educated to contact office with signs/symptoms of worsening.  Orders:    FL barium swallow ROUTINE esophagus; Future    Ambulatory Referral to Gastroenterology; Future     normal for race

## 2024-09-17 NOTE — ASSESSMENT & PLAN NOTE
Last A1c 7.8.  Due for another A1c.  Placed these lab orders today.  Educated on healthy diet and activity.  Will follow-up pending lab results.  Lab Results   Component Value Date    HGBA1C 7.8 (A) 06/17/2024       Orders:    Albumin / creatinine urine ratio; Future    Comprehensive metabolic panel; Future    Hemoglobin A1C; Future    Hemoglobin A1C; Future    CBC and differential; Future    Lipid Panel with Direct LDL reflex; Future

## 2024-09-17 NOTE — PROGRESS NOTES
Ambulatory Visit  Name: Alma Worley      : 1955      MRN: [id number]  Encounter Provider: Gen Patterson PA-C  Encounter Date: 2024   Encounter department: [facility]    Assessment & Plan  Longstanding persistent atrial fibrillation (HCC)  Anticoagulated on Eliquis and rate controlled on carvedilol.  Due to extensive cardiac history, she does need to follow-up with cardiology.  Referral given again today.  Orders:    Ambulatory Referral to Cardiology; Future    Coronary artery disease involving native heart without angina pectoris, unspecified vessel or lesion type  Is having some chest pain typically on exertion, but sometimes at rest with reaching.  Denies SOB increased, syncope, dizziness.  Referred to cardiology today.  This has been going on for 2 years.  Would like to get a stress test, but patient does not tolerate exercise or nuclear.  Orders:    Ambulatory Referral to Cardiology; Future    Acute on chronic congestive heart failure, unspecified heart failure type (HCC)  Appears euvolemic on history and physical today.  Referred to cardiology.  Go to ER if signs/symptoms of exacerbation.  Wt Readings from Last 3 Encounters:   24 95.3 kg (210 lb 3.2 oz)   24 93.4 kg (205 lb 14.6 oz)   24 93.4 kg (206 lb)               Orders:    Ambulatory Referral to Cardiology; Future    HTN, goal below 140/90  At goal today.  Continue current management.  Referred to cardiology.  Orders:    Ambulatory Referral to Cardiology; Future    Chronic obstructive pulmonary disease, unspecified COPD type (HCC)  Continue follow-up with pulmonary.  Continue Advair and albuterol as needed.  Not in acute exacerbation.  Contact office if that acute exacerbation occurs.       MOON (obstructive sleep apnea)  Continue follow-up with pulmonary.  Continue CPAP use.       Hypothyroidism, unspecified type  Continue levothyroxine.  Will follow-up pending lab results.  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  Due for another A1c.  Placed these lab orders today.  Educated on healthy diet and activity.  Will follow-up pending lab results.  Follows with ophthalmology  Lab Results   Component Value Date    HGBA1C 7.8 (A) 06/17/2024            Type 2 diabetes mellitus with hemoglobin A1c goal of less than 7.0% ([facility])  Last A1c 7.8.  Due for another A1c.  Placed these lab orders today.  Educated on healthy diet and activity.  Will follow-up pending lab results.  Lab Results   Component Value Date    HGBA1C 7.8 (A) 06/17/2024       Orders:    Albumin / creatinine urine ratio; Future    Comprehensive metabolic panel; Future    Hemoglobin A1C; Future    Hemoglobin A1C; Future    CBC and differential; Future    Lipid Panel with Direct LDL reflex; Future    Depression, recurrent (HCC)  Denies SI/HI.  Continue current management.         Morbid obesity due to excess calories ([facility])  Educated on healthy diet and activity.       Chronic bilateral low back pain without sciatica  Will follow-up pending imaging results.  Offered spine/pain management referral and PT referral.  Patient would like to see imaging results first.  Orders:    XR spine lumbar minimum 4 views non injury; Future    Chronic bilateral thoracic back pain  Will follow-up pending imaging results.  Offered spine/pain management referral and PT referral.  Patient would like to see imaging results first.  Orders:    XR spine thoracic 2 vw; Future    Dysphagia, unspecified type  More with solid foods.  Will refer to GI get barium swallow.  Educated to contact office with signs/symptoms of worsening.  Orders:    FL barium swallow ROUTINE esophagus; Future    Ambulatory Referral to Gastroenterology; Future    Chest pain, unspecified type  Will get CT of the chest.   "Does have degenerative changes on x-ray.  Was going to get stress test but patient does not tolerate either type.  Orders:    CT chest wo contrast; Future    Colon cancer screening  Risk/benefits/options discussed.  Patient declines.  Contact office if you would like to complete this.       Screening mammogram for breast cancer  Risk/benefits/options discussed.  Patient declines.  Contact office if you would like to complete this.       Screening for osteoporosis  Risk/benefits/options discussed.  Patient declines.  Contact office if you would like to complete this.          History of Present Illness     HPI  Patient is a 68-year-old female presenting for follow-up.  Patient states that she has been having chest pain for 2 years.  States that it is on the outside of her chest, and \"does not feel cardiac\".  Occurs on exertion, but will also occur at rest if she moves her arms a certain way.  Denies increased SOB, trauma, dizziness, syncope.  Does not follow with cardiology, but does have a strong cardiac history.  Patient states that she also has been having trouble swallowing solids.  No other concerns today.  History obtained from : patient  Review of Systems   Constitutional:  Negative for appetite change, chills, diaphoresis, fatigue and fever.   HENT:  Positive for trouble swallowing. Negative for congestion, ear discharge, ear pain, postnasal drip, rhinorrhea, sinus pressure, sinus pain, sneezing and sore throat.    Eyes:  Negative for pain, discharge, redness, itching and visual disturbance.   Respiratory:  Negative for apnea, cough, chest tightness, shortness of breath and wheezing.    Cardiovascular:  Positive for chest pain. Negative for palpitations and leg swelling.   Gastrointestinal:  Negative for abdominal pain, blood in stool, constipation, diarrhea, nausea and vomiting.   Endocrine: Negative for cold intolerance, heat intolerance, polydipsia and polyuria.   Genitourinary:  Negative for dysuria, " flank pain, frequency, hematuria and urgency.   Musculoskeletal:  Negative for arthralgias, back pain, myalgias, neck pain and neck stiffness.   Skin:  Negative for color change and rash.   Allergic/Immunologic: Negative for environmental allergies and food allergies.   Neurological:  Negative for dizziness, tremors, seizures, syncope, speech difficulty, weakness, light-headedness, numbness and headaches.   Hematological:  Negative for adenopathy. Does not bruise/bleed easily.   Psychiatric/Behavioral:  Negative for agitation, confusion, decreased concentration, dysphoric mood, hallucinations, self-injury, sleep disturbance and suicidal ideas. The patient is not nervous/anxious and is not hyperactive.    All other systems reviewed and are negative.    Medical History Reviewed by provider this encounter:  Tobacco  Allergies  Meds  Problems  Med Hx  Surg Hx  Fam Hx       Current Outpatient Medications on File Prior to Visit   Medication Sig Dispense Refill    albuterol (PROVENTIL HFA,VENTOLIN HFA) 90 mcg/act inhaler Inhale      amLODIPine (NORVASC) 10 mg tablet Take 1 tablet (10 mg total) by mouth daily 30 tablet 5    apixaban (ELIQUIS) 5 mg Take 1 tablet (5 mg total) by mouth 2 (two) times a day 180 tablet 1    carvedilol (COREG) 25 mg tablet Take 1 tablet (25 mg total) by mouth 2 (two) times a day with meals 180 tablet 1    clopidogrel (PLAVIX) 75 mg tablet Take 75 mg by mouth daily      fluticasone (FLONASE) 50 mcg/act nasal spray 2 sprays into each nostril as needed      Fluticasone-Salmeterol (Advair) 250-50 mcg/dose inhaler Inhale 1 puff 2 (two) times a day 60 blister 5    furosemide (LASIX) 40 mg tablet Take 1 tablet (40 mg total) by mouth 2 (two) times a day 60 tablet 2    insulin glargine (Toujeo SoloStar) 300 units/mL CONCENTRATED U-300 injection pen (1-unit dial) Inject 52 Units under the skin daily 15 mL 1    isosorbide mononitrate (IMDUR) 30 mg 24 hr tablet One by mouth daily      levothyroxine 25  mcg tablet Take 1 tablet (25 mcg total) by mouth daily 100 tablet 0    LORazepam (ATIVAN) 0.5 mg tablet Take 1 tablet (0.5 mg total) by mouth daily at bedtime as needed for anxiety 30 tablet 5    losartan (COZAAR) 100 MG tablet Take 100 mg by mouth daily      metFORMIN (GLUCOPHAGE-XR) 500 mg 24 hr tablet Take 2 tablets (1,000 mg total) by mouth 2 (two) times a day with meals 360 tablet 1    ondansetron (ZOFRAN) 4 mg tablet Take 1 tablet (4 mg total) by mouth every 8 (eight) hours as needed for nausea or vomiting 20 tablet 0    potassium chloride (Klor-Con M20) 20 mEq tablet Take 1 tablet (20 mEq total) by mouth daily 30 tablet 5    pravastatin (PRAVACHOL) 20 mg tablet Take 1 tablet (20 mg total) by mouth every other day 45 tablet 1    semaglutide, 0.25 or 0.5 mg/dose, (Ozempic, 0.25 or 0.5 MG/DOSE,) 2 mg/3 mL injection pen Inject 0.75 mL (0.5 mg total) under the skin every 7 days 3 mL 2    spironolactone (ALDACTONE) 25 mg tablet Take 1 tablet by mouth once daily 30 tablet 5    Zetia 10 MG tablet Take 10 mg by mouth daily      Blood Glucose Monitoring Suppl (OneTouch Verio Reflect) w/Device KIT Check blood sugars once daily. Please substitute with appropriate alternative as covered by patient's insurance. Dx: E11.65 1 kit 0    glucose blood (OneTouch Verio) test strip Check blood sugars once daily. Please substitute with appropriate alternative as covered by patient's insurance. Dx: E11.65 100 each 3    Insulin Pen Needle 32G X 4 MM MISC Use daily 100 each 1    nitroglycerin (NITROSTAT) 0.4 mg SL tablet 1 Tab Sublingual Every 5 minutes as needed for chest pain. If pain not resolved after third dose, seek emergency care      OneTouch Delica Lancets 33G MISC Check blood sugars once daily. Please substitute with appropriate alternative as covered by patient's insurance. Dx: E11.65 100 each 3     No current facility-administered medications on file prior to visit.      Social History     Tobacco Use    Smoking status:  "Former     Current packs/day: 0.25     Types: Cigarettes    Smokeless tobacco: Never   Vaping Use    Vaping status: Never Used   Substance and Sexual Activity    Alcohol use: Not Currently    Drug use: Never    Sexual activity: Not on file         Objective     /70 (BP Location: Left arm, Patient Position: Sitting)   Pulse 80   Temp 97.6 °F (36.4 °C) (Tympanic)   Ht 5' 2\" (1.575 m)   Wt 95.3 kg (210 lb 3.2 oz)   SpO2 97%   BMI 38.45 kg/m²     Physical Exam  Vitals and nursing note reviewed.   Constitutional:       General: She is not in acute distress.     Appearance: Normal appearance. She is well-developed and normal weight. She is not ill-appearing, toxic-appearing or diaphoretic.   HENT:      Head: Normocephalic and atraumatic.      Right Ear: Tympanic membrane normal.      Left Ear: Tympanic membrane normal.      Nose: Nose normal.      Mouth/Throat:      Mouth: Mucous membranes are moist.      Pharynx: Oropharynx is clear.   Eyes:      Conjunctiva/sclera: Conjunctivae normal.      Pupils: Pupils are equal, round, and reactive to light.   Cardiovascular:      Rate and Rhythm: Normal rate and regular rhythm.      Pulses: Normal pulses.      Heart sounds: Normal heart sounds. No murmur heard.  Pulmonary:      Effort: Pulmonary effort is normal. No respiratory distress.      Breath sounds: Normal breath sounds. No wheezing.   Chest:      Chest wall: No tenderness.   Abdominal:      General: Bowel sounds are normal.      Palpations: Abdomen is soft. There is no mass.      Tenderness: There is no abdominal tenderness.   Musculoskeletal:         General: No swelling or tenderness.      Cervical back: Normal range of motion and neck supple. No tenderness.      Right lower leg: No edema.      Left lower leg: No edema.   Lymphadenopathy:      Cervical: No cervical adenopathy.   Skin:     General: Skin is warm and dry.      Capillary Refill: Capillary refill takes less than 2 seconds.      Findings: No " erythema, lesion or rash.   Neurological:      General: No focal deficit present.      Mental Status: She is alert and oriented to person, place, and time. Mental status is at baseline.      Motor: No weakness.      Coordination: Coordination normal.      Gait: Gait normal.   Psychiatric:         Mood and Affect: Mood normal.         Behavior: Behavior normal.         Thought Content: Thought content normal.         Judgment: Judgment normal.

## 2024-09-17 NOTE — ASSESSMENT & PLAN NOTE
Last A1c 7.8.  Due for another A1c.  Placed these lab orders today.  Educated on healthy diet and activity.  Will follow-up pending lab results.  Follows with ophthalmology  Lab Results   Component Value Date    HGBA1C 7.8 (A) 06/17/2024

## 2024-09-17 NOTE — ASSESSMENT & PLAN NOTE
Is having some chest pain typically on exertion, but sometimes at rest with reaching.  Denies SOB increased, syncope, dizziness.  Referred to cardiology today.  This has been going on for 2 years.  Would like to get a stress test, but patient does not tolerate exercise or nuclear.  Orders:    Ambulatory Referral to Cardiology; Future

## 2024-09-17 NOTE — ASSESSMENT & PLAN NOTE
Appears euvolemic on history and physical today.  Referred to cardiology.  Go to ER if signs/symptoms of exacerbation.  Wt Readings from Last 3 Encounters:   09/17/24 95.3 kg (210 lb 3.2 oz)   06/27/24 93.4 kg (205 lb 14.6 oz)   06/17/24 93.4 kg (206 lb)               Orders:    Ambulatory Referral to Cardiology; Future

## 2024-09-17 NOTE — ASSESSMENT & PLAN NOTE
Will follow-up pending imaging results.  Offered spine/pain management referral and PT referral.  Patient would like to see imaging results first.  Orders:    XR spine lumbar minimum 4 views non injury; Future

## 2024-09-19 ENCOUNTER — TELEPHONE (OUTPATIENT)
Age: 69
End: 2024-09-19

## 2024-09-19 DIAGNOSIS — I25.10 CORONARY ARTERY DISEASE INVOLVING NATIVE HEART WITHOUT ANGINA PECTORIS, UNSPECIFIED VESSEL OR LESION TYPE: Primary | ICD-10-CM

## 2024-09-19 RX ORDER — CLOPIDOGREL BISULFATE 75 MG/1
75 TABLET ORAL DAILY
Qty: 90 TABLET | Refills: 1 | Status: SHIPPED | OUTPATIENT
Start: 2024-09-19

## 2024-09-19 NOTE — TELEPHONE ENCOUNTER
Patient only goes to [facility] for her tests. She asked for you to fax the orders for her barium swallow and CT Chest to [phone].

## 2024-09-19 NOTE — TELEPHONE ENCOUNTER
Patient called and stated she had heard from [facility] and they had stated they had received the barium swallow test however they did not receive the CT chest. Patient is wondering if this can be faxed again. Please advise. [phone]

## 2024-09-19 NOTE — TELEPHONE ENCOUNTER
Reason for call:   [x] Refill   [] Prior Auth  [] Other:     Office:   [x] PCP/Provider - KARIME LAU   [] Specialty/Provider -     Medication: clopidogrel (PLAVIX) 75 mg tablet     Dose/Frequency: Take 75 mg by mouth daily     Quantity: 90    Pharmacy: [address] [facility] [phone]    Does the patient have enough for 3 days?   [] Yes   [x] No - Send as HP to POD

## 2024-09-20 ENCOUNTER — TELEPHONE (OUTPATIENT)
Dept: CARDIOLOGY CLINIC | Facility: CLINIC | Age: 69
End: 2024-09-20

## 2024-09-20 NOTE — TELEPHONE ENCOUNTER
Working Referral QUE     Message below is in referral communications      Patient is established with Dr. Desir and needs to be seen for a F/U either with Provider or NP/PA , no recent Hosp stay but PCP sent Referral, there is a referral in system so if pt calls back to schedule, once scheduled please attach Referral thank You.

## 2024-09-25 ENCOUNTER — TELEPHONE (OUTPATIENT)
Dept: FAMILY MEDICINE CLINIC | Facility: CLINIC | Age: 69
End: 2024-09-25

## 2024-09-25 DIAGNOSIS — M51.369 LUMBAR DEGENERATIVE DISC DISEASE: Primary | ICD-10-CM

## 2024-09-25 DIAGNOSIS — M51.36 LUMBAR DEGENERATIVE DISC DISEASE: Primary | ICD-10-CM

## 2024-09-25 NOTE — TELEPHONE ENCOUNTER
Tried to reach pt. Phone rang and someone answered but then hung up. If pt calls back please see message from Gen.

## 2024-09-25 NOTE — TELEPHONE ENCOUNTER
----- Message from Gen Patterson PA-C sent at 9/25/2024 10:20 AM EDT -----  Degenerative changes on lumbar x-ray.  Do you want to see spine/pain management and/or physical therapy?

## 2024-09-25 NOTE — TELEPHONE ENCOUNTER
Relayed results to patient as per provider message. Patient expressed understanding and did not have any further questions.  She is requesting a referral to Pain Management.  She is requesting to find a physician that is closer. She doesn't want to go to [place]. She is also requesting a recommendation for pain that is OTC besides Tylenol. Please call patient to advise.

## 2024-09-26 ENCOUNTER — TELEPHONE (OUTPATIENT)
Age: 69
End: 2024-09-26

## 2024-09-26 NOTE — TELEPHONE ENCOUNTER
Ana bello from [employer] [facility] called and said they received some imaging orders for this pt. It has Mrn, first and last name but the orders do not have patients date of birth. It was for ct of the chest, barium swallow and xr of the lumbar and thoracic. Please refax orders with the date of birth to [phone] thank you.

## 2024-10-01 NOTE — TELEPHONE ENCOUNTER
Same issue for CT of the chest without contrast. There is only patients name on the order, but no other demographic info, like . Please refax with appropriate information   Fax [phone]

## 2024-10-04 ENCOUNTER — TELEPHONE (OUTPATIENT)
Dept: FAMILY MEDICINE CLINIC | Facility: CLINIC | Age: 69
End: 2024-10-04

## 2024-10-04 DIAGNOSIS — I27.20 PULMONARY HTN (HCC): Primary | ICD-10-CM

## 2024-10-04 NOTE — TELEPHONE ENCOUNTER
----- Message from Robert Budinetz, MD sent at 10/3/2024  6:01 PM EDT -----  The echo results showing enlarged heart with calcifications.  She is on appropriate medications.  Please confirm she is taking her pravastatin or statin in general.  It also shows pulmonary hypertension or a higher pressure in her lungs and she has a history of sleep apnea.  Please confirm she is following with a pulmonologist.  She probably also should follow-up with cardiology if she does not already.  Please confirm this if not I will place a referral

## 2024-10-04 NOTE — TELEPHONE ENCOUNTER
Pt aware. States she can only take half a pill of statin so she is taking 40 mg instead of 80. States she got real bad leg pains when she was taking the 80 mg. Notes she will call [facility] to schedule and appointment. Does not have a pulmonary dr and would like a referral to one at [facility].

## 2024-10-10 ENCOUNTER — TELEPHONE (OUTPATIENT)
Age: 69
End: 2024-10-10

## 2024-10-10 NOTE — TELEPHONE ENCOUNTER
Called back and spoke with Maria G in radiology. Both orders were scanned into pt's [employer] chart.  I found completed Catscan in our system. (Done at [facility]) They then asked for the barium to be sent which I did and received confirmation of receipt.  I circled pt's  and address.

## 2024-10-10 NOTE — TELEPHONE ENCOUNTER
Blayne from [facility] called in stating they are holding on this since 09/19/24 they received an fax for CT Chest and Barium Swallow but that fax is not complete. They need both this orders to be faxed @ [phone]. She only provided with patient medical number and name. Mentioned she do not have date of birth or the address or contact number of the patient on the fax they received.    Please do help if any concerns reach her @ [phone].    Thanks

## 2024-10-14 ENCOUNTER — APPOINTMENT (OUTPATIENT)
Dept: RADIOLOGY | Facility: MEDICAL CENTER | Age: 69
End: 2024-10-14
Payer: COMMERCIAL

## 2024-10-14 ENCOUNTER — CONSULT (OUTPATIENT)
Dept: PAIN MEDICINE | Facility: CLINIC | Age: 69
End: 2024-10-14
Payer: COMMERCIAL

## 2024-10-14 VITALS
HEIGHT: 62 IN | SYSTOLIC BLOOD PRESSURE: 120 MMHG | TEMPERATURE: 98.4 F | RESPIRATION RATE: 18 BRPM | DIASTOLIC BLOOD PRESSURE: 70 MMHG | WEIGHT: 220.6 LBS | HEART RATE: 87 BPM | OXYGEN SATURATION: 97 % | BODY MASS INDEX: 40.59 KG/M2

## 2024-10-14 DIAGNOSIS — F41.1 GENERALIZED ANXIETY DISORDER: ICD-10-CM

## 2024-10-14 DIAGNOSIS — M51.369 LUMBAR DEGENERATIVE DISC DISEASE: ICD-10-CM

## 2024-10-14 DIAGNOSIS — M54.2 NECK PAIN: ICD-10-CM

## 2024-10-14 DIAGNOSIS — M54.14 THORACIC RADICULOPATHY: Primary | ICD-10-CM

## 2024-10-14 DIAGNOSIS — G89.4 CHRONIC PAIN SYNDROME: ICD-10-CM

## 2024-10-14 DIAGNOSIS — M54.12 CERVICAL RADICULOPATHY: ICD-10-CM

## 2024-10-14 DIAGNOSIS — M54.9 MID BACK PAIN: ICD-10-CM

## 2024-10-14 PROCEDURE — G2211 COMPLEX E/M VISIT ADD ON: HCPCS | Performed by: ANESTHESIOLOGY

## 2024-10-14 PROCEDURE — 72072 X-RAY EXAM THORAC SPINE 3VWS: CPT

## 2024-10-14 PROCEDURE — 99204 OFFICE O/P NEW MOD 45 MIN: CPT | Performed by: ANESTHESIOLOGY

## 2024-10-14 NOTE — PROGRESS NOTES
Assessment:  1. Thoracic radiculopathy    2. Mid back pain    3. Neck pain    4. Cervical radiculopathy    5. Lumbar degenerative disc disease    6. Chronic pain syndrome        Plan:  Patient is a 68-year-old male with complaints of neck pain and mid back pain status post fall in 2011 presents to office for initial consultation.  X-ray of lumbar spine shows advanced degenerative disc disease at L3-L4, L4-5, 5 S1.  Patient noted to have significant neck pain and mid back pain.  Patient reports the pain will sometimes radiate into her chest noted sometimes it feels like she is having a heart attack sometimes she feels like she has burning pain in the middle of her back and also in the neck.  At this time we will provide patient with a home exercise regimen and based on patient's improvement with the regimen we may obtain more diagnostic imaging.  1.  We will order an x-ray of the thoracic spine to better assess the degenerative change Mercedes patient current presentation.  2.  We will provide patient with a home exercise regimen for cervical and thoracic spine strengthening exercises for patient to perform 5 days a week 3 reps 12 sets  3.  We will follow-up in 6 weeks at which we will gauge patient response to home exercise regimen and possibly order MRI of the cervical and thoracic spine      History of Present Illness:    The patient is a 68 y.o. female who presents for consultation in regards to Back Pain.  Symptoms have been present for several years. Symptoms began without any precipitating injury or trauma. Pain is reported to be 5 on the numeric rating scale.  Symptoms are felt intermittently and worst in the afternoon, nighttime.  Symptoms are characterized as numbing and pressure-like.  Symptoms are associated with no weakness.  Aggravating factors include standing, bending, leaning forward, leaning bckward, and walking.  Relieving factors include nothing.  No change in symptoms with kneeling, lying down,  sitting, turning the head, coughing/sneezing, and bowel movements.  Treatments that have been helpful include heat/ice. Medications to relieve symptoms include capsaicin, Tylenol, Ativan.    Review of Systems:    Review of Systems   Musculoskeletal:  Positive for back pain, myalgias, neck pain and neck stiffness.   Psychiatric/Behavioral:  The patient is nervous/anxious.         Depression   All other systems reviewed and are negative.          Past Medical History:   Diagnosis Date    Anemia     CHF (congestive heart failure) (HCC)     Diabetes type 2, controlled (HCC)     Heart attack (HCC)     Hypertension        Past Surgical History:   Procedure Laterality Date    CARDIAC CATHETERIZATION      SHOULDER SURGERY Right        History reviewed. No pertinent family history.    Social History     Occupational History    Not on file   Tobacco Use    Smoking status: Former     Current packs/day: 0.25     Types: Cigarettes    Smokeless tobacco: Never   Vaping Use    Vaping status: Never Used   Substance and Sexual Activity    Alcohol use: Not Currently    Drug use: Never    Sexual activity: Not on file         Current Outpatient Medications:     albuterol (PROVENTIL HFA,VENTOLIN HFA) 90 mcg/act inhaler, Inhale, Disp: , Rfl:     amLODIPine (NORVASC) 10 mg tablet, Take 1 tablet (10 mg total) by mouth daily, Disp: 30 tablet, Rfl: 5    apixaban (ELIQUIS) 5 mg, Take 1 tablet (5 mg total) by mouth 2 (two) times a day, Disp: 180 tablet, Rfl: 1    Blood Glucose Monitoring Suppl (OneTouch Verio Reflect) w/Device KIT, Check blood sugars once daily. Please substitute with appropriate alternative as covered by patient's insurance. Dx: E11.65, Disp: 1 kit, Rfl: 0    carvedilol (COREG) 25 mg tablet, Take 1 tablet (25 mg total) by mouth 2 (two) times a day with meals, Disp: 180 tablet, Rfl: 1    clopidogrel (PLAVIX) 75 mg tablet, Take 1 tablet (75 mg total) by mouth daily, Disp: 90 tablet, Rfl: 1    fluticasone (FLONASE) 50 mcg/act  nasal spray, 2 sprays into each nostril as needed, Disp: , Rfl:     Fluticasone-Salmeterol (Advair) 250-50 mcg/dose inhaler, Inhale 1 puff 2 (two) times a day, Disp: 60 blister, Rfl: 5    fluticasone-umeclidinium-vilanterol 100-62.5-25 mcg/actuation inhaler, Inhale 1 puff daily, Disp: , Rfl:     furosemide (LASIX) 40 mg tablet, Take 1 tablet (40 mg total) by mouth 2 (two) times a day, Disp: 60 tablet, Rfl: 2    glucose blood (OneTouch Verio) test strip, Check blood sugars once daily. Please substitute with appropriate alternative as covered by patient's insurance. Dx: E11.65, Disp: 100 each, Rfl: 3    insulin glargine (Toujeo SoloStar) 300 units/mL CONCENTRATED U-300 injection pen (1-unit dial), Inject 52 Units under the skin daily, Disp: 15 mL, Rfl: 1    Insulin Pen Needle 32G X 4 MM MISC, Use daily, Disp: 100 each, Rfl: 1    isosorbide mononitrate (IMDUR) 30 mg 24 hr tablet, One by mouth daily, Disp: , Rfl:     levothyroxine 25 mcg tablet, Take 1 tablet (25 mcg total) by mouth daily, Disp: 100 tablet, Rfl: 0    LORazepam (ATIVAN) 0.5 mg tablet, Take 1 tablet (0.5 mg total) by mouth daily at bedtime as needed for anxiety, Disp: 30 tablet, Rfl: 5    losartan (COZAAR) 100 MG tablet, Take 100 mg by mouth daily, Disp: , Rfl:     metFORMIN (GLUCOPHAGE-XR) 500 mg 24 hr tablet, Take 2 tablets (1,000 mg total) by mouth 2 (two) times a day with meals, Disp: 360 tablet, Rfl: 1    nitroglycerin (NITROSTAT) 0.4 mg SL tablet, 1 Tab Sublingual Every 5 minutes as needed for chest pain. If pain not resolved after third dose, seek emergency care, Disp: , Rfl:     ondansetron (ZOFRAN) 4 mg tablet, Take 1 tablet (4 mg total) by mouth every 8 (eight) hours as needed for nausea or vomiting, Disp: 20 tablet, Rfl: 0    OneTouch Delica Lancets 33G MISC, Check blood sugars once daily. Please substitute with appropriate alternative as covered by patient's insurance. Dx: E11.65, Disp: 100 each, Rfl: 3    potassium chloride (Klor-Con M20) 20  "mEq tablet, Take 1 tablet (20 mEq total) by mouth daily, Disp: 30 tablet, Rfl: 5    pravastatin (PRAVACHOL) 20 mg tablet, Take 1 tablet (20 mg total) by mouth every other day, Disp: 45 tablet, Rfl: 1    semaglutide, 0.25 or 0.5 mg/dose, (Ozempic, 0.25 or 0.5 MG/DOSE,) 2 mg/3 mL injection pen, Inject 0.75 mL (0.5 mg total) under the skin every 7 days, Disp: 3 mL, Rfl: 2    spironolactone (ALDACTONE) 25 mg tablet, Take 1 tablet by mouth once daily, Disp: 30 tablet, Rfl: 5    Zetia 10 MG tablet, Take 10 mg by mouth daily, Disp: , Rfl:     Allergies   Allergen Reactions    Lisinopril Cough       Physical Exam:    /70   Pulse 87   Temp 98.4 °F (36.9 °C)   Resp 18   Ht 5' 2\" (1.575 m)   Wt 100 kg (220 lb 9.6 oz)   SpO2 97%   BMI 40.35 kg/m²     Constitutional: normal, well developed, well nourished, alert, in no distress and non-toxic and no overt pain behavior. and obese  Eyes: anicteric  HEENT: grossly intact  Neck: supple, symmetric, trachea midline and no masses   Pulmonary:even and unlabored  Cardiovascular:No edema or pitting edema present  Skin:Normal without rashes or lesions and well hydrated  Psychiatric:Mood and affect appropriate  Neurologic:Cranial Nerves II-XII grossly intact  Musculoskeletal:antalgic    Cervical Spine examination demonstrates. Decreased ROM secondary to pain with lateral rotation to the left/right and bending to the left/right, in addition to neck flexion. 5/5 upper extremity strength in all muscle groups bilaterally. Negative Spurling's maneuver to the b/l Ue, sensitivity to light touch intact b/l Ue.    Thoracic Spine examination demonstrates. Bilateral thoracic paraspinal musculature tender to palpation with muscle spasms noted throughout her paraspinals.    Imaging    XR spine lumbar minimum 4 views non injury  Order: [id number]  Impression    IMPRESSION:  Multilevel moderate to advanced degenerative disc disease.    THIS DOCUMENT HAS BEEN ELECTRONICALLY SIGNED BY JOSELITO YU, " MD  Narrative    PROCEDURE INFORMATION:  Exam: XR Lumbosacral Spine  Exam date and time: 9/18/2024 11:35 AM  Age: 68 years old  Clinical indication: Other chronic pain; Low back pain, unspecified    TECHNIQUE:  Imaging protocol: Radiologic exam of the lumbosacral spine.  Views: 4 or 5 views.    COMPARISON:  CT ABD/PELVIS W IV CONTRAST - WO ORAL CONTRAST 4/28/2024 4:46 PM    FINDINGS:  Bones/joints: The vertebral body heights and alignment are maintained. There is  moderate to advanced degenerative disc disease at L3-L4, L4-L5, and L5-S1.  Soft tissues: Unremarkable.  Exam End: 09/18/24 11:42 AM       No orders to display       No orders of the defined types were placed in this encounter.

## 2024-10-14 NOTE — TELEPHONE ENCOUNTER
Reason for call:   [x] Refill   [] Prior Auth  [] Other:     Office:   [x] PCP/Provider -   [] Specialty/Provider -     Medication: LORazepam (ATIVAN) 0.5 mg tablet     Dose/Frequency: Take 1 tablet (0.5 mg total) by mouth daily at bedtime as needed     Quantity: 30 tablet    Pharmacy:   [address] [phone]       Does the patient have enough for 3 days?   [x] Yes   [] No - Send as HP to POD

## 2024-10-14 NOTE — PATIENT INSTRUCTIONS
Patient Education     Neck Pain Exercises   About this topic   The neck or cervical spine has 7 spinal bones that run from the base of your skull to the upper back. These spinal bones have discs in between them. Discs act as shock absorbers. Ligaments are strong bands of tissue that hold the bones together. Many muscles surround and attach on these bones. Nerves come off of the spinal cord and exit out of small spaces in between the spinal bones. You can have neck pain if any of these are injured or damaged. Exercises may help to make this problem better.  General   Before starting with a program, ask your doctor if you are healthy enough to do these exercises. Your doctor may have you work with a  or physical therapist to make a safe exercise program to meet your needs. You should not do the exercises if they cause sharp pains, if you feel dizzy, or if you have vision changes.  Stretching Exercises   Stretching exercises keep your muscles flexible. They also stop them from getting tight. Start by doing each of these stretches 2 to 3 times. In order for your body to make changes, you will need to hold these stretches for 20 to 30 seconds. Try to do the stretches 2 to 3 times each day. Do all exercises slowly.  Passive neck stretches:  Put your left hand on top of your head. Your other arm can be at your side or behind your back. Pull your head toward your left shoulder until you feel a gentle stretch on the right side of your neck. Repeat on the other side using your other hand.  Also, try this stretch by pulling in a diagonal direction. With your left hand on top of your head, pull your head down towards the direction of your left knee. You should feel this stretch toward the back on the right side of your neck. Repeat on the other side.  Active neck stretches:  Neck front-to-back motion ? Look down to the floor until you feel a stretch in the back of your neck. Hold. Next, look up to the ceiling until you  feel a stretch in the front of your neck. Hold.  Neck side-to-side motion ? Tilt your head to the side and bring your ear to your shoulder until you feel a stretch on the other side of your neck. Hold. Next, tilt your head to the other side until you feel a stretch. Hold.  Neck turning ? Turn only your head and look over your left shoulder until you feel stretching in the right side of your neck. Hold. Now turn only your head and look over your right shoulder until you feel a stretch in the left side of your neck. Hold.  Scalene stretches ? Grasp your head with the hand opposite the side you want to stretch. Pull your head to the side until you feel a stretch. Now, slowly turn your head so your chin is pointed upwards.  Chin tucks ? Stand straight or lie down on your back. Tuck your chin in and lengthen the back of your neck. Return to the starting position and repeat. It may help to stand up against a wall during this exercise. Try gently pushing your chin with two fingers while trying to flatten your neck against the wall. If you do this exercise lying down, try using a small rolled up washcloth under your neck. Push down into the washcloth when tucking in your chin.  Strengthening Exercises   Strengthening exercises keep your muscles firm and strong. Start by repeating each exercise 2 to 3 times. Work up to doing each exercise 10 times. Try to do the exercises 2 to 3 times each day. Hold each exercise for 3 to 5 seconds. Do all exercises slowly.  Shoulder blade squeezes ? Pinch your shoulder blades together on your upper back and hold 3 to 5 seconds. Relax.             What will the results be?   Less pain and stiffness  Better range of motion  Increased strength  Help you heal faster after an injury or surgery  Increase blood flow to a body part  Help you feel better and more relaxed  Give you more energy  More toned looking muscles  Better posture  Easier to do daily activities  Helpful tips   Stay active and  work out to keep your muscles strong and flexible.  Be sure you do not hold your breath when exercising. This can raise your blood pressure. If you tend to hold your breath, try counting out loud when exercising. If any exercise bothers you, stop right away.  Try swinging your arms at an easy pace for a few minutes to warm up your muscles. Do this again after exercising.  Doing exercises before a meal may be a good way to get into a routine.  Exercise may be slightly uncomfortable, but you should not have sharp pains. If you do get sharp pains, stop what you are doing. If the sharp pains continue, call your doctor.  Last Reviewed Date   2020-03-10  Consumer Information Use and Disclaimer   This generalized information is a limited summary of diagnosis, treatment, and/or medication information. It is not meant to be comprehensive and should be used as a tool to help the user understand and/or assess potential diagnostic and treatment options. It does NOT include all information about conditions, treatments, medications, side effects, or risks that may apply to a specific patient. It is not intended to be medical advice or a substitute for the medical advice, diagnosis, or treatment of a health care provider based on the health care provider's examination and assessment of a patient’s specific and unique circumstances. Patients must speak with a health care provider for complete information about their health, medical questions, and treatment options, including any risks or benefits regarding use of medications. This information does not endorse any treatments or medications as safe, effective, or approved for treating a specific patient. [employer] and its affiliates disclaim any warranty or liability relating to this information or the use thereof. The use of this information is governed by the Terms of Use, available at https://www.[employer]ters[employer] Solutionsuwer.com/en/know/clinical-effectiveness-terms   Copyright   Copyright ©  2024 [employer] and its affiliates and/or licensors. All rights reserved.

## 2024-10-15 RX ORDER — LORAZEPAM 0.5 MG/1
0.5 TABLET ORAL
Qty: 30 TABLET | Refills: 0 | Status: SHIPPED | OUTPATIENT
Start: 2024-10-15

## 2024-10-18 DIAGNOSIS — E11.9 TYPE 2 DIABETES MELLITUS WITH HEMOGLOBIN A1C GOAL OF LESS THAN 7.0% (HCC): ICD-10-CM

## 2024-10-18 NOTE — TELEPHONE ENCOUNTER
Reason for call:   [x] Refill   [] Prior Auth  [] Other:     Office:   [x] PCP/Provider - [facility] Reece Sanchez MD   [] Specialty/Provider -     Medication:     semaglutide, 0.25 or 0.5 mg/dose, (Ozempic, 0.25 or 0.5 MG/DOSE,) 2 mg/3 mL injection pen       Dose/Frequency: Inject 0.75 mL (0.5 mg total) under the skin every 7 days         Pharmacy: [facility]     Does the patient have enough for 3 days?   [x] Yes   [] No - Send as HP to POD

## 2024-10-23 ENCOUNTER — TELEPHONE (OUTPATIENT)
Age: 69
End: 2024-10-23

## 2024-10-23 DIAGNOSIS — I50.9 ACUTE ON CHRONIC CONGESTIVE HEART FAILURE, UNSPECIFIED HEART FAILURE TYPE (HCC): ICD-10-CM

## 2024-10-23 RX ORDER — FUROSEMIDE 20 MG/1
TABLET ORAL
Qty: 270 TABLET | Refills: 0 | Status: SHIPPED | OUTPATIENT
Start: 2024-10-23

## 2024-10-23 RX ORDER — FUROSEMIDE 20 MG/1
40 TABLET ORAL SEE ADMIN INSTRUCTIONS
Qty: 180 TABLET | Refills: 0 | Status: CANCELLED | OUTPATIENT
Start: 2024-10-23

## 2024-10-23 NOTE — TELEPHONE ENCOUNTER
You saw pt in May.  She was not taking the lasix as prescribed 40 mg bid.  She was taking 40 mg in am and then 20 mg in pm, along with the aldactone 25 mg qd.      She has been doing fine on that dose.  She has appt with [facility] cardiologist in Dec.  Ok to send a script to hold her on the lasix until she is seen.    She can not drive down here. It is too far and her care will not get her back and worth.    Please advise

## 2024-10-23 NOTE — TELEPHONE ENCOUNTER
Su, pharmacist from [facility] calls regarding pt.  She spoke with pt, and patient is confused with doses of Spironolactone and Furosemide, and having side effects of her Pravastatin.  She would like someone to reach out to pt to discuss this. Informed her I would send a message over to HF nurses.

## 2024-11-06 DIAGNOSIS — I50.9 ACUTE ON CHRONIC CONGESTIVE HEART FAILURE, UNSPECIFIED HEART FAILURE TYPE (HCC): ICD-10-CM

## 2024-11-06 NOTE — TELEPHONE ENCOUNTER
Reason for call:   [x] Refill   [] Prior Auth  [] Other:     Office:   [x] PCP/Provider -    [] Specialty/Provider -     Medication: Eliquis    Dose/Frequency: 5 mg BID     Quantity: 90day    Pharmacy: [facility] o file    Does the patient have enough for 3 days?   [x] Yes   [] No - Send as HP to PO

## 2024-11-08 DIAGNOSIS — E11.9 TYPE 2 DIABETES MELLITUS WITH HEMOGLOBIN A1C GOAL OF LESS THAN 7.0% (HCC): ICD-10-CM

## 2024-11-08 RX ORDER — INSULIN GLARGINE 300 U/ML
52 INJECTION, SOLUTION SUBCUTANEOUS DAILY
Qty: 15 ML | Refills: 1 | Status: SHIPPED | OUTPATIENT
Start: 2024-11-08

## 2024-11-12 LAB
LEFT EYE DIABETIC RETINOPATHY: POSITIVE
RIGHT EYE DIABETIC RETINOPATHY: POSITIVE

## 2024-11-13 DIAGNOSIS — I50.9 ACUTE ON CHRONIC CONGESTIVE HEART FAILURE, UNSPECIFIED HEART FAILURE TYPE (HCC): ICD-10-CM

## 2024-11-13 DIAGNOSIS — F41.1 GENERALIZED ANXIETY DISORDER: ICD-10-CM

## 2024-11-13 RX ORDER — LORAZEPAM 0.5 MG/1
0.5 TABLET ORAL
Qty: 30 TABLET | Refills: 0 | Status: SHIPPED | OUTPATIENT
Start: 2024-11-13

## 2024-11-13 RX ORDER — SPIRONOLACTONE 25 MG/1
25 TABLET ORAL DAILY
Qty: 30 TABLET | Refills: 5 | OUTPATIENT
Start: 2024-11-13

## 2024-11-13 NOTE — TELEPHONE ENCOUNTER
Reason for call:   [x] Refill   [] Prior Auth  [] Other:     Office:   [] PCP/Provider -   [x] Specialty/Provider - Cardio    Medication: furosemide (LASIX) 20 mg tablet     Dose/Frequency: Take 40 mg in am and 20 mg in afternoon     Quantity: 270    Pharmacy: [facility] [address]      Does the patient have enough for 3 days?   [] Yes   [x] No - Send as HP to POD

## 2024-11-13 NOTE — TELEPHONE ENCOUNTER
Reason for call:   [x] Refill   [] Prior Auth  [] Other:     Office:   [x] PCP/Provider -     Reece Sanchez MD     [] Specialty/Provider -     Medication: LORazepam (ATIVAN) 0.5 mg tablet     Dose/Frequency: Take 1 tablet (0.5 mg total) by mouth daily at bedtime as needed for anxiety     Quantity: 30    Pharmacy: [address]      Does the patient have enough for 3 days?   [] Yes   [x] No - Send as HP to POD    Reason for call:   [x] Refill   [] Prior Auth  [] Other:     Office:   [x] PCP/Provider -  Reece Sanchez MD  [] Specialty/Provider -     Medication: spironolactone (ALDACTONE) 25 mg tablet     Dose/Frequency: Take 1 tablet by mouth once daily     Quantity: 30    Pharmacy: [address]      Does the patient have enough for 3 days?   [] Yes   [x] No - Send as HP to POD

## 2024-11-14 DIAGNOSIS — I25.10 CORONARY ARTERY DISEASE INVOLVING NATIVE HEART WITHOUT ANGINA PECTORIS, UNSPECIFIED VESSEL OR LESION TYPE: Primary | ICD-10-CM

## 2024-11-14 RX ORDER — ISOSORBIDE MONONITRATE 30 MG/1
30 TABLET, EXTENDED RELEASE ORAL DAILY
Qty: 90 TABLET | Refills: 1 | Status: SHIPPED | OUTPATIENT
Start: 2024-11-14

## 2024-11-15 RX ORDER — FUROSEMIDE 20 MG/1
TABLET ORAL
Qty: 270 TABLET | Refills: 1 | Status: SHIPPED | OUTPATIENT
Start: 2024-11-15

## 2024-11-21 ENCOUNTER — OFFICE VISIT (OUTPATIENT)
Dept: FAMILY MEDICINE CLINIC | Facility: CLINIC | Age: 69
End: 2024-11-21
Payer: COMMERCIAL

## 2024-11-21 VITALS
TEMPERATURE: 97.8 F | HEART RATE: 87 BPM | SYSTOLIC BLOOD PRESSURE: 126 MMHG | WEIGHT: 211 LBS | DIASTOLIC BLOOD PRESSURE: 78 MMHG | OXYGEN SATURATION: 97 % | BODY MASS INDEX: 38.59 KG/M2

## 2024-11-21 DIAGNOSIS — E66.01 MORBID OBESITY DUE TO EXCESS CALORIES (HCC): ICD-10-CM

## 2024-11-21 DIAGNOSIS — J44.9 CHRONIC OBSTRUCTIVE PULMONARY DISEASE, UNSPECIFIED COPD TYPE (HCC): ICD-10-CM

## 2024-11-21 DIAGNOSIS — Z12.11 COLON CANCER SCREENING: ICD-10-CM

## 2024-11-21 DIAGNOSIS — E03.9 HYPOTHYROIDISM, UNSPECIFIED TYPE: ICD-10-CM

## 2024-11-21 DIAGNOSIS — I48.11 LONGSTANDING PERSISTENT ATRIAL FIBRILLATION (HCC): ICD-10-CM

## 2024-11-21 DIAGNOSIS — R55 PRE-SYNCOPE: ICD-10-CM

## 2024-11-21 DIAGNOSIS — Z23 ENCOUNTER FOR IMMUNIZATION: ICD-10-CM

## 2024-11-21 DIAGNOSIS — E11.3391 MODERATE NONPROLIFERATIVE DIABETIC RETINOPATHY OF RIGHT EYE WITHOUT MACULAR EDEMA ASSOCIATED WITH TYPE 2 DIABETES MELLITUS (HCC): ICD-10-CM

## 2024-11-21 DIAGNOSIS — I10 HTN, GOAL BELOW 140/90: ICD-10-CM

## 2024-11-21 DIAGNOSIS — F33.9 DEPRESSION, RECURRENT (HCC): ICD-10-CM

## 2024-11-21 DIAGNOSIS — R13.10 DYSPHAGIA, UNSPECIFIED TYPE: ICD-10-CM

## 2024-11-21 DIAGNOSIS — Z12.31 ENCOUNTER FOR SCREENING MAMMOGRAM FOR BREAST CANCER: ICD-10-CM

## 2024-11-21 DIAGNOSIS — I25.10 CORONARY ARTERY DISEASE INVOLVING NATIVE HEART WITHOUT ANGINA PECTORIS, UNSPECIFIED VESSEL OR LESION TYPE: ICD-10-CM

## 2024-11-21 DIAGNOSIS — E11.9 TYPE 2 DIABETES MELLITUS WITH HEMOGLOBIN A1C GOAL OF LESS THAN 7.0% (HCC): Primary | ICD-10-CM

## 2024-11-21 DIAGNOSIS — Z13.820 SCREENING FOR OSTEOPOROSIS: ICD-10-CM

## 2024-11-21 LAB — SL AMB POCT HEMOGLOBIN AIC: 8.1 (ref ?–6.5)

## 2024-11-21 PROCEDURE — G0008 ADMIN INFLUENZA VIRUS VAC: HCPCS

## 2024-11-21 PROCEDURE — 83036 HEMOGLOBIN GLYCOSYLATED A1C: CPT

## 2024-11-21 PROCEDURE — 90662 IIV NO PRSV INCREASED AG IM: CPT

## 2024-11-21 PROCEDURE — 99214 OFFICE O/P EST MOD 30 MIN: CPT

## 2024-11-21 RX ORDER — LEVOTHYROXINE SODIUM 25 UG/1
25 TABLET ORAL DAILY
Qty: 90 TABLET | Refills: 3 | Status: SHIPPED | OUTPATIENT
Start: 2024-11-21

## 2024-11-21 NOTE — ASSESSMENT & PLAN NOTE
Lab Results   Component Value Date    HGBA1C 8.1 (A) 11/21/2024   Will be receiving eye doctor records.  Continue follow-up with eye doctor.

## 2024-11-21 NOTE — ASSESSMENT & PLAN NOTE
Rate controlled on carvedilol and anticoagulated on Eliquis.  Continue follow-up with cardiology.

## 2024-11-21 NOTE — PROGRESS NOTES
Name: Alma Worley      : 1955      MRN: [id number]  Encounter Provider: Gen Patterson PA-C  Encounter Date: 2024   Encounter department: [facility]  :  Assessment & Plan  Type 2 diabetes mellitus with hemoglobin A1c goal of less than 7.0% ([facility])  A1c 8.1 today.  Will increase Ozempic to 1 mg weekly.  Educated on side effects.  Will continue to monitor this.  Educated on healthy diet and activity.  Foot exam today at goal.  Continue follow-up with podiatry.  Lab Results   Component Value Date    HGBA1C 8.1 (A) 2024       Orders:    POCT hemoglobin A1c    semaglutide, 1 mg/dose, (Ozempic) 4 mg/3 mL injection pen; Inject 0.75 mL (1 mg total) under the skin once a week    Hypothyroidism, unspecified type  Will continue to monitor.  Continue levothyroxine 25 mcg daily.  Orders:    levothyroxine 25 mcg tablet; Take 1 tablet (25 mcg total) by mouth daily    HTN, goal below 140/90  At goal today.  Continue amlodipine, carvedilol, spironolactone.       Longstanding persistent atrial fibrillation (HCC)  Rate controlled on carvedilol and anticoagulated on Eliquis.  Continue follow-up with cardiology.       Coronary artery disease involving native heart without angina pectoris, unspecified vessel or lesion type  Continue follow-up with cardiology.  Continue Eliquis, clopidogrel, Zetia, pravastatin       Chronic obstructive pulmonary disease, unspecified COPD type (HCC)  Continue inhalers.  Not in acute exacerbation.  Contact office if exacerbation occurs.  Continue follow-up with pulmonology.       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  Continue follow-up with eye doctor.         Depression, recurrent (HCC)  Stable.  Denies SI/HI.  Contact office if worsening.         Morbid obesity  due to excess calories (HCC)  Educated on healthy diet and activity.         Pre-syncope  Continue follow-up with cardiology.  Does seem to be a vagal response since it is occurring now only with cold beverages.  Was originally had dysphagia, but no longer dysphagia but presyncope.  Will still get barium swallow.  Will follow with neurology if worsening or no improvement.  Continue follow-up with GI.  Educated on signs/symptoms of worsening including cardiac and CVA causes, and is to go to ER if these present.       Dysphagia, unspecified type  Continue follow-up with cardiology.  Does seem to be a vagal response since it is occurring now only with cold beverages.  Was originally had dysphagia, but no longer dysphagia but presyncope.  Will still get barium swallow.  Will follow with neurology if worsening or no improvement.  Continue follow-up with GI.  Educated on signs/symptoms of worsening including cardiac and CVA causes, and is to go to ER if these present.       Encounter for screening mammogram for breast cancer  Risks/Discussed.  Patient declines.       Encounter for immunization    Orders:    influenza vaccine, high-dose, PF 0.5 mL (Fluzone High Dose)    Screening for osteoporosis  Risks/Discussed.  Patient declines.       Colon cancer screening  Risks/Discussed.  Patient declines.              History of Present Illness     Patient is a 69-year-old female presenting for follow-up.  Patient states that her dysphagia is now progressed into a presyncope.  Denies any fall syncope.  Will just need to bend over/lean over and close her eyes when she has cold beverages.  Denies heart palpitations, SOB, VALERIO, facial drooping, weakness, numbness, abnormal gait.      Review of Systems   Constitutional:  Negative for appetite change, chills, diaphoresis, fatigue and fever.   HENT:  Negative for congestion, ear discharge, ear pain, postnasal drip, rhinorrhea, sinus pressure, sinus pain, sneezing and sore throat.     Eyes:  Negative for pain, discharge, redness, itching and visual disturbance.   Respiratory:  Negative for apnea, cough, chest tightness, shortness of breath and wheezing.    Cardiovascular:  Negative for chest pain, palpitations and leg swelling.   Gastrointestinal:  Negative for abdominal pain, blood in stool, constipation, diarrhea, nausea and vomiting.   Endocrine: Negative for cold intolerance, heat intolerance, polydipsia and polyuria.   Genitourinary:  Negative for dysuria, flank pain, frequency, hematuria and urgency.   Musculoskeletal:  Negative for arthralgias, back pain, myalgias, neck pain and neck stiffness.   Skin:  Negative for color change and rash.   Allergic/Immunologic: Negative for environmental allergies and food allergies.   Neurological:  Negative for dizziness, tremors, seizures, syncope, speech difficulty, weakness, light-headedness, numbness and headaches.        Presyncope   Hematological:  Negative for adenopathy. Does not bruise/bleed easily.   Psychiatric/Behavioral:  Negative for agitation, confusion, decreased concentration, dysphoric mood, hallucinations, self-injury, sleep disturbance and suicidal ideas. The patient is not nervous/anxious and is not hyperactive.    All other systems reviewed and are negative.    Medical History Reviewed by provider this encounter:     .  Current Outpatient Medications on File Prior to Visit   Medication Sig Dispense Refill    albuterol (PROVENTIL HFA,VENTOLIN HFA) 90 mcg/act inhaler Inhale      amLODIPine (NORVASC) 10 mg tablet Take 1 tablet (10 mg total) by mouth daily 30 tablet 5    apixaban (ELIQUIS) 5 mg Take 1 tablet (5 mg total) by mouth 2 (two) times a day 180 tablet 1    Blood Glucose Monitoring Suppl (OneTouch Verio Reflect) w/Device KIT Check blood sugars once daily. Please substitute with appropriate alternative as covered by patient's insurance. Dx: E11.65 1 kit 0    carvedilol (COREG) 25 mg tablet Take 1 tablet (25 mg total) by mouth  2 (two) times a day with meals 180 tablet 1    clopidogrel (PLAVIX) 75 mg tablet Take 1 tablet (75 mg total) by mouth daily 90 tablet 1    fluticasone (FLONASE) 50 mcg/act nasal spray 2 sprays into each nostril as needed      fluticasone-umeclidinium-vilanterol 100-62.5-25 mcg/actuation inhaler Inhale 1 puff daily      furosemide (LASIX) 20 mg tablet Take 40 mg in am and 20 mg in afternoon 270 tablet 1    glucose blood (OneTouch Verio) test strip Check blood sugars once daily. Please substitute with appropriate alternative as covered by patient's insurance. Dx: E11.65 100 each 3    insulin glargine (Toujeo SoloStar) 300 units/mL CONCENTRATED U-300 injection pen (1-unit dial) INJECT 52 UNITS SUBCUTANEOUSLY ONCE DAILY 15 mL 1    Insulin Pen Needle 32G X 4 MM MISC Use daily 100 each 1    isosorbide mononitrate (IMDUR) 30 mg 24 hr tablet Take 1 tablet (30 mg total) by mouth daily 90 tablet 1    LORazepam (ATIVAN) 0.5 mg tablet Take 1 tablet (0.5 mg total) by mouth daily at bedtime as needed for anxiety 30 tablet 0    losartan (COZAAR) 100 MG tablet Take 100 mg by mouth daily      metFORMIN (GLUCOPHAGE-XR) 500 mg 24 hr tablet Take 2 tablets (1,000 mg total) by mouth 2 (two) times a day with meals 360 tablet 1    nitroglycerin (NITROSTAT) 0.4 mg SL tablet 1 Tab Sublingual Every 5 minutes as needed for chest pain. If pain not resolved after third dose, seek emergency care      ondansetron (ZOFRAN) 4 mg tablet Take 1 tablet (4 mg total) by mouth every 8 (eight) hours as needed for nausea or vomiting 20 tablet 0    OneTouch Delica Lancets 33G MISC Check blood sugars once daily. Please substitute with appropriate alternative as covered by patient's insurance. Dx: E11.65 100 each 3    potassium chloride (Klor-Con M20) 20 mEq tablet Take 1 tablet (20 mEq total) by mouth daily 30 tablet 5    pravastatin (PRAVACHOL) 20 mg tablet Take 1 tablet (20 mg total) by mouth every other day 45 tablet 1    spironolactone (ALDACTONE) 25 mg  tablet Take 1 tablet by mouth once daily 30 tablet 5    Zetia 10 MG tablet Take 10 mg by mouth daily      [DISCONTINUED] levothyroxine 25 mcg tablet Take 1 tablet (25 mcg total) by mouth daily 100 tablet 0    [DISCONTINUED] semaglutide, 0.25 or 0.5 mg/dose, (Ozempic, 0.25 or 0.5 MG/DOSE,) 2 mg/3 mL injection pen Inject 0.75 mL (0.5 mg total) under the skin every 7 days 3 mL 5    Fluticasone-Salmeterol (Advair) 250-50 mcg/dose inhaler Inhale 1 puff 2 (two) times a day (Patient not taking: Reported on 11/21/2024) 60 blister 5     No current facility-administered medications on file prior to visit.      Social History     Tobacco Use    Smoking status: Former     Current packs/day: 0.25     Types: Cigarettes    Smokeless tobacco: Never   Vaping Use    Vaping status: Never Used   Substance and Sexual Activity    Alcohol use: Not Currently    Drug use: Never    Sexual activity: Not Currently        Objective   /78 (BP Location: Left arm, Patient Position: Sitting)   Pulse 87   Temp 97.8 °F (36.6 °C) (Tympanic)   Wt 95.7 kg (211 lb)   SpO2 97%   BMI 38.59 kg/m²      Physical Exam  Vitals and nursing note reviewed.   Constitutional:       General: She is not in acute distress.     Appearance: Normal appearance. She is well-developed. She is obese. She is not ill-appearing, toxic-appearing or diaphoretic.   HENT:      Head: Normocephalic and atraumatic.      Right Ear: Tympanic membrane normal.      Left Ear: Tympanic membrane normal.      Nose: Nose normal.      Mouth/Throat:      Mouth: Mucous membranes are moist.      Pharynx: Oropharynx is clear.   Eyes:      Conjunctiva/sclera: Conjunctivae normal.      Pupils: Pupils are equal, round, and reactive to light.   Cardiovascular:      Rate and Rhythm: Normal rate and regular rhythm.      Pulses: Normal pulses. no weak pulses.           Dorsalis pedis pulses are 2+ on the right side and 2+ on the left side.        Posterior tibial pulses are 2+ on the right side  and 2+ on the left side.      Heart sounds: Normal heart sounds. No murmur heard.  Pulmonary:      Effort: Pulmonary effort is normal. No respiratory distress.      Breath sounds: Normal breath sounds. No wheezing.   Chest:      Chest wall: No tenderness.   Abdominal:      General: Bowel sounds are normal.      Palpations: Abdomen is soft. There is no mass.      Tenderness: There is no abdominal tenderness.   Musculoskeletal:         General: No swelling or tenderness. Normal range of motion.      Cervical back: Normal range of motion and neck supple. No tenderness.      Right lower leg: No edema.      Left lower leg: No edema.   Feet:      Right foot:      Skin integrity: No ulcer, skin breakdown, erythema, warmth, callus or dry skin.      Left foot:      Skin integrity: No ulcer, skin breakdown, erythema, warmth, callus or dry skin.   Lymphadenopathy:      Cervical: No cervical adenopathy.   Skin:     General: Skin is warm and dry.      Capillary Refill: Capillary refill takes less than 2 seconds.      Findings: No erythema, lesion or rash.   Neurological:      General: No focal deficit present.      Mental Status: She is alert and oriented to person, place, and time. Mental status is at baseline.      Cranial Nerves: No cranial nerve deficit.      Sensory: No sensory deficit.      Motor: No weakness.      Coordination: Coordination normal.      Gait: Gait normal.      Deep Tendon Reflexes: Reflexes normal.   Psychiatric:         Mood and Affect: Mood normal.         Behavior: Behavior normal.         Thought Content: Thought content normal.         Judgment: Judgment normal.       Diabetic Foot Exam    Patient's shoes and socks removed.    Right Foot/Ankle   Right Foot Inspection  Skin Exam: skin normal and skin intact. No dry skin, no warmth, no callus, no erythema, no maceration, no abnormal color, no pre-ulcer, no ulcer and no callus.     Toe Exam: ROM and strength within normal limits.     Sensory    Monofilament testing: intact    Vascular  Capillary refills: < 3 seconds  The right DP pulse is 2+. The right PT pulse is 2+.     Left Foot/Ankle  Left Foot Inspection  Skin Exam: skin normal and skin intact. No dry skin, no warmth, no erythema, no maceration, normal color, no pre-ulcer, no ulcer and no callus.     Toe Exam: ROM and strength within normal limits.     Sensory   Monofilament testing: intact    Vascular  Capillary refills: < 3 seconds  The left DP pulse is 2+. The left PT pulse is 2+.     Assign Risk Category  No deformity present  No loss of protective sensation  No weak pulses  Risk: 0

## 2024-11-21 NOTE — ASSESSMENT & PLAN NOTE
Patient called all in with changes he was feeling, sore back feeling , discomfort in groin and legs and all has been on his right side  He thought it was a result of exercising but he stopped for 2 weeks and still feels the discomfort  He's been taking aleve but it doesn't help  Will continue to monitor.  Continue levothyroxine 25 mcg daily.  Orders:    levothyroxine 25 mcg tablet; Take 1 tablet (25 mcg total) by mouth daily

## 2024-11-21 NOTE — ASSESSMENT & PLAN NOTE
Continue follow-up with cardiology.  Does seem to be a vagal response since it is occurring now only with cold beverages.  Was originally had dysphagia, but no longer dysphagia but presyncope.  Will still get barium swallow.  Will follow with neurology if worsening or no improvement.  Continue follow-up with GI.  Educated on signs/symptoms of worsening including cardiac and CVA causes, and is to go to ER if these present.

## 2024-11-21 NOTE — ASSESSMENT & PLAN NOTE
Continue inhalers.  Not in acute exacerbation.  Contact office if exacerbation occurs.  Continue follow-up with pulmonology.

## 2024-11-21 NOTE — ASSESSMENT & PLAN NOTE
A1c 8.1 today.  Will increase Ozempic to 1 mg weekly.  Educated on side effects.  Will continue to monitor this.  Educated on healthy diet and activity.  Foot exam today at goal.  Continue follow-up with podiatry.  Lab Results   Component Value Date    HGBA1C 8.1 (A) 11/21/2024       Orders:    POCT hemoglobin A1c    semaglutide, 1 mg/dose, (Ozempic) 4 mg/3 mL injection pen; Inject 0.75 mL (1 mg total) under the skin once a week

## 2024-11-22 ENCOUNTER — TELEPHONE (OUTPATIENT)
Dept: ADMINISTRATIVE | Facility: OTHER | Age: 69
End: 2024-11-22

## 2024-11-22 NOTE — TELEPHONE ENCOUNTER
----- Message from Jacquelyn GALAN sent at 11/21/2024  2:46 PM EST -----  11/21/24 2:46 PM    Hello, our patient has had Diabetic Eye Exam completed/performed. Please assist in updating the patient chart by pulling the Care Everywhere (CE) document. The date of service is 11/12/24.     Thank you,  Jacquelyn LAU

## 2024-11-22 NOTE — TELEPHONE ENCOUNTER
Upon review of the In Basket request we were able to locate, review, and update the patient chart as requested for Diabetic Eye Exam.    Any additional questions or concerns should be emailed to the Practice Liaisons via the appropriate education email address, please do not reply via In Basket.    Thank you  Su Hermosillo MA   [facility]

## 2024-11-25 ENCOUNTER — OFFICE VISIT (OUTPATIENT)
Dept: PAIN MEDICINE | Facility: CLINIC | Age: 69
End: 2024-11-25
Payer: COMMERCIAL

## 2024-11-25 VITALS
BODY MASS INDEX: 40.3 KG/M2 | DIASTOLIC BLOOD PRESSURE: 69 MMHG | OXYGEN SATURATION: 97 % | RESPIRATION RATE: 16 BRPM | WEIGHT: 219 LBS | HEIGHT: 62 IN | SYSTOLIC BLOOD PRESSURE: 112 MMHG | TEMPERATURE: 97.3 F | HEART RATE: 94 BPM

## 2024-11-25 DIAGNOSIS — M54.14 THORACIC RADICULOPATHY: ICD-10-CM

## 2024-11-25 DIAGNOSIS — M54.12 CERVICAL RADICULOPATHY: Primary | ICD-10-CM

## 2024-11-25 DIAGNOSIS — G89.4 CHRONIC PAIN SYNDROME: ICD-10-CM

## 2024-11-25 PROCEDURE — G2211 COMPLEX E/M VISIT ADD ON: HCPCS | Performed by: ANESTHESIOLOGY

## 2024-11-25 PROCEDURE — 99214 OFFICE O/P EST MOD 30 MIN: CPT | Performed by: ANESTHESIOLOGY

## 2024-11-25 NOTE — PROGRESS NOTES
Assessment:  1. Cervical radiculopathy    2. Thoracic radiculopathy    3. Chronic pain syndrome        Plan:  Patient is a 68-year-old male with complaints of neck pain and mid back pain status post fall in 2011 presents to office for with chronic pain syndrome secondary to cervical degenerative disc disease, thoracic degenerative disease presents to office for follow-up visit.  Has been active in physical therapy and denies any significant improvement of symptoms.  At this time we need further diagnostic imaging to better assess current presentation.    1.  We will order an MRI of the cervical and thoracic spine  2.  Follow-up in 1 month to review diagnostic imaging and plan interventional management.        History of Present Illness:  The patient is a 69 y.o. female who presents for a follow up office visit in regards to Back Pain and Neck Pain.   The patient’s current symptoms include 6 out of 10 occasional throbbing pain in the neck and mid back worse in the morning at nighttime.    Current pain medications includes:  none.     I have personally reviewed and/or updated the patient's past medical history, past surgical history, family history, social history, current medications, allergies, and vital signs today.         Review of Systems  Review of Systems   Cardiovascular:  Positive for chest pain.   Musculoskeletal:  Positive for back pain, gait problem, neck pain and neck stiffness.        Decreased ROM   Neurological:  Positive for dizziness.   All other systems reviewed and are negative.          Past Medical History:   Diagnosis Date    Anemia     CHF (congestive heart failure) (HCC)     Diabetes type 2, controlled (HCC)     Heart attack (HCC)     Hypertension        Past Surgical History:   Procedure Laterality Date    CARDIAC CATHETERIZATION      SHOULDER SURGERY Right        History reviewed. No pertinent family history.    Social History     Occupational History    Not on file   Tobacco Use    Smoking  status: Former     Current packs/day: 0.25     Types: Cigarettes    Smokeless tobacco: Never   Vaping Use    Vaping status: Never Used   Substance and Sexual Activity    Alcohol use: Not Currently    Drug use: Never    Sexual activity: Not Currently         Current Outpatient Medications:     albuterol (PROVENTIL HFA,VENTOLIN HFA) 90 mcg/act inhaler, Inhale, Disp: , Rfl:     amLODIPine (NORVASC) 10 mg tablet, Take 1 tablet (10 mg total) by mouth daily, Disp: 30 tablet, Rfl: 5    apixaban (ELIQUIS) 5 mg, Take 1 tablet (5 mg total) by mouth 2 (two) times a day, Disp: 180 tablet, Rfl: 1    Blood Glucose Monitoring Suppl (OneTouch Verio Reflect) w/Device KIT, Check blood sugars once daily. Please substitute with appropriate alternative as covered by patient's insurance. Dx: E11.65, Disp: 1 kit, Rfl: 0    carvedilol (COREG) 25 mg tablet, Take 1 tablet (25 mg total) by mouth 2 (two) times a day with meals, Disp: 180 tablet, Rfl: 1    clopidogrel (PLAVIX) 75 mg tablet, Take 1 tablet (75 mg total) by mouth daily, Disp: 90 tablet, Rfl: 1    fluticasone (FLONASE) 50 mcg/act nasal spray, 2 sprays into each nostril as needed, Disp: , Rfl:     fluticasone-umeclidinium-vilanterol 100-62.5-25 mcg/actuation inhaler, Inhale 1 puff daily, Disp: , Rfl:     furosemide (LASIX) 20 mg tablet, Take 40 mg in am and 20 mg in afternoon, Disp: 270 tablet, Rfl: 1    glucose blood (OneTouch Verio) test strip, Check blood sugars once daily. Please substitute with appropriate alternative as covered by patient's insurance. Dx: E11.65, Disp: 100 each, Rfl: 3    insulin glargine (Toujeo SoloStar) 300 units/mL CONCENTRATED U-300 injection pen (1-unit dial), INJECT 52 UNITS SUBCUTANEOUSLY ONCE DAILY, Disp: 15 mL, Rfl: 1    Insulin Pen Needle 32G X 4 MM MISC, Use daily, Disp: 100 each, Rfl: 1    isosorbide mononitrate (IMDUR) 30 mg 24 hr tablet, Take 1 tablet (30 mg total) by mouth daily, Disp: 90 tablet, Rfl: 1    levothyroxine 25 mcg tablet, Take 1  "tablet (25 mcg total) by mouth daily, Disp: 90 tablet, Rfl: 3    LORazepam (ATIVAN) 0.5 mg tablet, Take 1 tablet (0.5 mg total) by mouth daily at bedtime as needed for anxiety, Disp: 30 tablet, Rfl: 0    losartan (COZAAR) 100 MG tablet, Take 100 mg by mouth daily, Disp: , Rfl:     metFORMIN (GLUCOPHAGE-XR) 500 mg 24 hr tablet, Take 2 tablets (1,000 mg total) by mouth 2 (two) times a day with meals, Disp: 360 tablet, Rfl: 1    nitroglycerin (NITROSTAT) 0.4 mg SL tablet, 1 Tab Sublingual Every 5 minutes as needed for chest pain. If pain not resolved after third dose, seek emergency care, Disp: , Rfl:     ondansetron (ZOFRAN) 4 mg tablet, Take 1 tablet (4 mg total) by mouth every 8 (eight) hours as needed for nausea or vomiting, Disp: 20 tablet, Rfl: 0    OneTouch Delica Lancets 33G MISC, Check blood sugars once daily. Please substitute with appropriate alternative as covered by patient's insurance. Dx: E11.65, Disp: 100 each, Rfl: 3    potassium chloride (Klor-Con M20) 20 mEq tablet, Take 1 tablet (20 mEq total) by mouth daily, Disp: 30 tablet, Rfl: 5    pravastatin (PRAVACHOL) 20 mg tablet, Take 1 tablet (20 mg total) by mouth every other day, Disp: 45 tablet, Rfl: 1    semaglutide, 1 mg/dose, (Ozempic) 4 mg/3 mL injection pen, Inject 0.75 mL (1 mg total) under the skin once a week, Disp: 9 mL, Rfl: 1    spironolactone (ALDACTONE) 25 mg tablet, Take 1 tablet by mouth once daily, Disp: 30 tablet, Rfl: 5    Zetia 10 MG tablet, Take 10 mg by mouth daily, Disp: , Rfl:     Fluticasone-Salmeterol (Advair) 250-50 mcg/dose inhaler, Inhale 1 puff 2 (two) times a day (Patient not taking: Reported on 11/21/2024), Disp: 60 blister, Rfl: 5    Allergies   Allergen Reactions    Lisinopril Cough       Physical Exam:    /69   Pulse 94   Temp (!) 97.3 °F (36.3 °C)   Resp 16   Ht 5' 2\" (1.575 m)   Wt 99.3 kg (219 lb)   SpO2 97%   BMI 40.06 kg/m²     Constitutional:normal, well developed, well nourished, alert, in no " distress and non-toxic and no overt pain behavior. and obese  Eyes:anicteric  HEENT:grossly intact  Neck:supple, symmetric, trachea midline and no masses   Pulmonary:even and unlabored  Cardiovascular:No edema or pitting edema present  Skin:Normal without rashes or lesions and well hydrated  Psychiatric:Mood and affect appropriate  Neurologic:Cranial Nerves II-XII grossly intact  Musculoskeletal:antalgic    Cervical Spine examination demonstrates. Decreased ROM secondary to pain with lateral rotation to the left/right and bending to the left/right, in addition to neck flexion. 4/5 upper extremity strength in all muscle groups bilaterally. Negative Spurling's maneuver to the b/l Ue, sensitivity to light touch intact b/l Ue.    Thoracic Spine examination demonstrates. Bilateral thoracic paraspinal musculature tender to palpation with muscle spasms noted throughout her paraspinals.    Imaging    XR spine lumbar minimum 4 views non injury  Order: [id number]  Impression    IMPRESSION:  Multilevel moderate to advanced degenerative disc disease.    THIS DOCUMENT HAS BEEN ELECTRONICALLY SIGNED BY JOSELITO YU MD  Narrative    PROCEDURE INFORMATION:  Exam: XR Lumbosacral Spine  Exam date and time: 9/18/2024 11:35 AM  Age: 68 years old  Clinical indication: Other chronic pain; Low back pain, unspecified    TECHNIQUE:  Imaging protocol: Radiologic exam of the lumbosacral spine.  Views: 4 or 5 views.    COMPARISON:  CT ABD/PELVIS W IV CONTRAST - WO ORAL CONTRAST 4/28/2024 4:46 PM    FINDINGS:  Bones/joints: The vertebral body heights and alignment are maintained. There is  moderate to advanced degenerative disc disease at L3-L4, L4-L5, and L5-S1.  Soft tissues: Unremarkable.  Exam End: 09/18/24 11:42 AM           No orders to display       No orders of the defined types were placed in this encounter.

## 2024-11-25 NOTE — PATIENT INSTRUCTIONS
"Patient Education     Exercises for Upper Back Pain   About this topic   Upper back pain may be felt anywhere from the base of the neck into the middle part of the back. This includes the upper or thoracic spine ? the part that would be in line with your chest. This is also the part of your back where the ribs link to the spine. The upper back is mostly for stability and works with the rib cage to protect your organs. It does not have as much movement as the neck and lower back. Exercises may help to make this problem better.  General   Before starting with a program, ask your doctor if you are healthy enough to do these exercises. Your doctor may have you work with a  or physical therapist to make a safe exercise program to meet your needs.  Stretching Exercises   Stretching exercises keep your muscles flexible. They also stop them from getting tight. Start by doing each of these stretches 2 to 3 times. In order for your body to make changes, you will need to hold these stretches for 20 to 30 seconds. Try to do the stretches 2 to 3 times each day. Do all exercises slowly.  Corner stretches:  T position ? Stand about one foot away from a corner. Bend your elbows and bring your upper arms to shoulder height. Rest your arms on the wall. Keep your back straight and gently lean forward until you feel a stretch in the front of your chest and shoulders.  V position ? Stand about one foot away from a corner. With your elbows straight, put only your hands on the wall and make a letter \"V\". Keep your back straight and gently lean forward until you feel a stretch in the front of your chest and shoulders.  Rounded back stretches ? Start in the all fours position. Tuck your chin and tighten your stomach muscles to round your back.  Midback rotations ? Start on all fours. Walk your hands to one side until you feel a good stretch on the opposite side. Then, walk your hands to the other side and hold. Now, start by sitting " back on your heels. Walk your hands to one side until you feel a good stretch on the opposite side. Then, walk your hands to the other side and hold. You should feel this stretch in a slightly different area than when on all fours.  Strengthening Exercises   Strengthening exercises keep your muscles firm and strong. Stand or sit up tall on a chair without arms for your exercises. Start by repeating each exercise 2 to 3 times. Work up to doing each exercise 10 times. Try to do the exercises 2 to 3 times each day. Do all exercises slowly.  Shoulder blade squeezes ? Pinch your shoulder blades together on your upper back and hold 3 to 5 seconds. Relax.  Arm and leg lifts on hands and knees ? Start on your hands and knees. With all of these exercises, keep your back as level as possible. If you are having trouble with this, you may want to put a small object on your back, such as a book. If it falls off, you are not keeping your back level enough during the exercise.  Lift one arm up to shoulder level and hold. Lower it back down. Now, lift up the other arm and hold.  Lift one leg up to hip level and hold. Lower it back down. Now, lift up the other leg and hold.  Lift one arm and the OPPOSITE leg up at the same time and hold. Lower them down. Now, repeat using the other arm and leg. This is a very hard exercise. It may take time to be able to do this.  Shoulder blade exercises ? Lie on your stomach near the edge of a mat, bed, or supported on an exercise ball. Start with your arms hanging down in a relaxed position.  Y position ? Raise your arms up and to the sides so your elbows are near your ears and your arms are straight out diagonally like the letter Y. Lower the arms back to the starting position.  T position ? Raise your arms straight out to the sides, even with your shoulders. Lower the arms back to the starting position.  W position ? Raise your arms up and to the sides with your elbows bent. Your hands should  be just above your shoulders and looks like a W from above. Lower the arms back to the starting position.  L position ? Keep your elbows at your sides and raise just your lower arms out to the side to make a letter L and a backwards letter L. Lower the arms back to the starting position.               What will the results be?   Reduce pain  Improve flexibility  Improve motion  Improve strength  Improve body posture  Lower stress  Prevent re-injury  Helpful tips   Stay active and work out to keep your muscles strong and flexible.  Keep a healthy weight so there is not extra stress on your joints. Eat a healthy diet to keep your muscles healthy.  Be sure you do not hold your breath when exercising. This can raise your blood pressure. If you tend to hold your breath, try counting out loud when exercising. If any exercise bothers you, stop right away.  Always warm up before stretching. Heated muscles stretch much easier than cool muscles. Stretching cool muscles can lead to injury.  Try walking or cycling at an easy pace for a few minutes to warm up your muscles. Do this again after exercising.  Never bounce when doing stretches.  After exercising, it is a good idea to use ice. Place an ice pack or a bag of frozen peas wrapped in a towel over the painful part. Never put ice right on the skin. Do not leave the ice on more than 10 to 15 minutes at a time. Ice after activity may help decrease pain and swelling. Never ice before stretching.  Doing exercises before a meal may be a good way to get into a routine.  Exercise may be slightly uncomfortable, but you should not have sharp pains. If you do get sharp pains, stop what you are doing. If the sharp pains continue, call your doctor.  Last Reviewed Date   2020-03-10  Consumer Information Use and Disclaimer   This generalized information is a limited summary of diagnosis, treatment, and/or medication information. It is not meant to be comprehensive and should be used as a  tool to help the user understand and/or assess potential diagnostic and treatment options. It does NOT include all information about conditions, treatments, medications, side effects, or risks that may apply to a specific patient. It is not intended to be medical advice or a substitute for the medical advice, diagnosis, or treatment of a health care provider based on the health care provider's examination and assessment of a patient’s specific and unique circumstances. Patients must speak with a health care provider for complete information about their health, medical questions, and treatment options, including any risks or benefits regarding use of medications. This information does not endorse any treatments or medications as safe, effective, or approved for treating a specific patient. [employer], [employer] and its affiliates disclaim any warranty or liability relating to this information or the use thereof. The use of this information is governed by the Terms of Use, available at https://www.[employer].lensgen/en/know/clinical-effectiveness-terms   Copyright   Copyright © 2024 [employer], [employer] and its affiliates and/or licensors. All rights reserved.  Patient Education     Neck Pain Exercises   About this topic   The neck or cervical spine has 7 spinal bones that run from the base of your skull to the upper back. These spinal bones have discs in between them. Discs act as shock absorbers. Ligaments are strong bands of tissue that hold the bones together. Many muscles surround and attach on these bones. Nerves come off of the spinal cord and exit out of small spaces in between the spinal bones. You can have neck pain if any of these are injured or damaged. Exercises may help to make this problem better.  General   Before starting with a program, ask your doctor if you are healthy enough to do these exercises. Your doctor may have you work with a  or physical therapist to make a safe exercise program to meet your  needs. You should not do the exercises if they cause sharp pains, if you feel dizzy, or if you have vision changes.  Stretching Exercises   Stretching exercises keep your muscles flexible. They also stop them from getting tight. Start by doing each of these stretches 2 to 3 times. In order for your body to make changes, you will need to hold these stretches for 20 to 30 seconds. Try to do the stretches 2 to 3 times each day. Do all exercises slowly.  Passive neck stretches:  Put your left hand on top of your head. Your other arm can be at your side or behind your back. Pull your head toward your left shoulder until you feel a gentle stretch on the right side of your neck. Repeat on the other side using your other hand.  Also, try this stretch by pulling in a diagonal direction. With your left hand on top of your head, pull your head down towards the direction of your left knee. You should feel this stretch toward the back on the right side of your neck. Repeat on the other side.  Active neck stretches:  Neck front-to-back motion ? Look down to the floor until you feel a stretch in the back of your neck. Hold. Next, look up to the ceiling until you feel a stretch in the front of your neck. Hold.  Neck side-to-side motion ? Tilt your head to the side and bring your ear to your shoulder until you feel a stretch on the other side of your neck. Hold. Next, tilt your head to the other side until you feel a stretch. Hold.  Neck turning ? Turn only your head and look over your left shoulder until you feel stretching in the right side of your neck. Hold. Now turn only your head and look over your right shoulder until you feel a stretch in the left side of your neck. Hold.  Scalene stretches ? Grasp your head with the hand opposite the side you want to stretch. Pull your head to the side until you feel a stretch. Now, slowly turn your head so your chin is pointed upwards.  Chin tucks ? Stand straight or lie down on your back.  Tuck your chin in and lengthen the back of your neck. Return to the starting position and repeat. It may help to stand up against a wall during this exercise. Try gently pushing your chin with two fingers while trying to flatten your neck against the wall. If you do this exercise lying down, try using a small rolled up washcloth under your neck. Push down into the washcloth when tucking in your chin.  Strengthening Exercises   Strengthening exercises keep your muscles firm and strong. Start by repeating each exercise 2 to 3 times. Work up to doing each exercise 10 times. Try to do the exercises 2 to 3 times each day. Hold each exercise for 3 to 5 seconds. Do all exercises slowly.  Shoulder blade squeezes ? Pinch your shoulder blades together on your upper back and hold 3 to 5 seconds. Relax.             What will the results be?   Less pain and stiffness  Better range of motion  Increased strength  Help you heal faster after an injury or surgery  Increase blood flow to a body part  Help you feel better and more relaxed  Give you more energy  More toned looking muscles  Better posture  Easier to do daily activities  Helpful tips   Stay active and work out to keep your muscles strong and flexible.  Be sure you do not hold your breath when exercising. This can raise your blood pressure. If you tend to hold your breath, try counting out loud when exercising. If any exercise bothers you, stop right away.  Try swinging your arms at an easy pace for a few minutes to warm up your muscles. Do this again after exercising.  Doing exercises before a meal may be a good way to get into a routine.  Exercise may be slightly uncomfortable, but you should not have sharp pains. If you do get sharp pains, stop what you are doing. If the sharp pains continue, call your doctor.  Last Reviewed Date   2020-03-10  Consumer Information Use and Disclaimer   This generalized information is a limited summary of diagnosis, treatment, and/or  medication information. It is not meant to be comprehensive and should be used as a tool to help the user understand and/or assess potential diagnostic and treatment options. It does NOT include all information about conditions, treatments, medications, side effects, or risks that may apply to a specific patient. It is not intended to be medical advice or a substitute for the medical advice, diagnosis, or treatment of a health care provider based on the health care provider's examination and assessment of a patient’s specific and unique circumstances. Patients must speak with a health care provider for complete information about their health, medical questions, and treatment options, including any risks or benefits regarding use of medications. This information does not endorse any treatments or medications as safe, effective, or approved for treating a specific patient. UpToDate, Inc. and its affiliates disclaim any warranty or liability relating to this information or the use thereof. The use of this information is governed by the Terms of Use, available at https://www.[facility].com/en/know/clinical-effectiveness-terms   Copyright   Copyright © 2024 UpToDate, Inc. and its affiliates and/or licensors. All rights reserved.

## 2024-12-05 ENCOUNTER — HOSPITAL ENCOUNTER (OUTPATIENT)
Dept: MRI IMAGING | Facility: HOSPITAL | Age: 69
Discharge: HOME | End: 2024-12-05
Attending: ANESTHESIOLOGY
Payer: COMMERCIAL

## 2024-12-05 DIAGNOSIS — I10 HTN, GOAL BELOW 140/90: ICD-10-CM

## 2024-12-05 DIAGNOSIS — M54.12 CERVICAL RADICULOPATHY: ICD-10-CM

## 2024-12-05 DIAGNOSIS — G89.4 CHRONIC PAIN SYNDROME: ICD-10-CM

## 2024-12-05 DIAGNOSIS — M54.14 THORACIC RADICULOPATHY: ICD-10-CM

## 2024-12-05 PROCEDURE — 72141 MRI NECK SPINE W/O DYE: CPT

## 2024-12-05 PROCEDURE — 72146 MRI CHEST SPINE W/O DYE: CPT

## 2024-12-05 RX ORDER — CARVEDILOL 25 MG/1
25 TABLET ORAL 2 TIMES DAILY WITH MEALS
Qty: 180 TABLET | Refills: 1 | Status: SHIPPED | OUTPATIENT
Start: 2024-12-05

## 2024-12-05 NOTE — TELEPHONE ENCOUNTER
Reason for call:   [x] Refill   [] Prior Auth  [] Other:     Office:   [x] PCP/Provider -   [] Specialty/Provider -     Medication: carvedilol 25 mg, take 1 tablet by mouth 2 times a day       Pharmacy: [facility]    Does the patient have enough for 3 days?   [x] Yes   [] No - Send as HP to POD

## 2024-12-09 DIAGNOSIS — F41.1 GENERALIZED ANXIETY DISORDER: ICD-10-CM

## 2024-12-09 NOTE — TELEPHONE ENCOUNTER
Reason for call:   [x] Refill   [] Prior Auth  [] Other:     Office:   [x] PCP/Provider - [facility]   [] Specialty/Provider -     Medication:   ~ LORazepam (ATIVAN) 0.5 mg tablet - Take 1 tablet (0.5 mg total) by mouth daily at bedtime as needed for anxiety     Pharmacy:   [id number] - [facility] [address]     Does the patient have enough for 3 days?   [x] Yes   [] No - Send as HP to POD

## 2024-12-10 RX ORDER — LORAZEPAM 0.5 MG/1
0.5 TABLET ORAL
Qty: 30 TABLET | Refills: 0 | Status: SHIPPED | OUTPATIENT
Start: 2024-12-10

## 2024-12-16 DIAGNOSIS — E11.9 TYPE 2 DIABETES MELLITUS WITH HEMOGLOBIN A1C GOAL OF LESS THAN 7.0% (HCC): ICD-10-CM

## 2024-12-17 ENCOUNTER — RESULTS FOLLOW-UP (OUTPATIENT)
Dept: FAMILY MEDICINE CLINIC | Facility: CLINIC | Age: 69
End: 2024-12-17

## 2024-12-17 RX ORDER — METFORMIN HYDROCHLORIDE 500 MG/1
1000 TABLET, EXTENDED RELEASE ORAL 2 TIMES DAILY WITH MEALS
Qty: 360 TABLET | Refills: 1 | Status: SHIPPED | OUTPATIENT
Start: 2024-12-17

## 2025-01-08 ENCOUNTER — RA CDI HCC (OUTPATIENT)
Dept: OTHER | Facility: HOSPITAL | Age: 70
End: 2025-01-08

## 2025-01-08 ENCOUNTER — OFFICE VISIT (OUTPATIENT)
Dept: FAMILY MEDICINE CLINIC | Facility: CLINIC | Age: 70
End: 2025-01-08
Payer: COMMERCIAL

## 2025-01-08 ENCOUNTER — TELEPHONE (OUTPATIENT)
Age: 70
End: 2025-01-08

## 2025-01-08 VITALS
WEIGHT: 214 LBS | SYSTOLIC BLOOD PRESSURE: 130 MMHG | TEMPERATURE: 98.1 F | OXYGEN SATURATION: 97 % | HEIGHT: 63 IN | DIASTOLIC BLOOD PRESSURE: 72 MMHG | BODY MASS INDEX: 37.92 KG/M2 | HEART RATE: 89 BPM

## 2025-01-08 DIAGNOSIS — Z12.11 COLON CANCER SCREENING: ICD-10-CM

## 2025-01-08 DIAGNOSIS — J44.9 CHRONIC OBSTRUCTIVE PULMONARY DISEASE, UNSPECIFIED COPD TYPE (HCC): ICD-10-CM

## 2025-01-08 DIAGNOSIS — E78.2 MIXED HYPERLIPIDEMIA: ICD-10-CM

## 2025-01-08 DIAGNOSIS — E66.01 MORBID OBESITY DUE TO EXCESS CALORIES (HCC): ICD-10-CM

## 2025-01-08 DIAGNOSIS — Z13.820 SCREENING FOR OSTEOPOROSIS: ICD-10-CM

## 2025-01-08 DIAGNOSIS — Z00.00 MEDICARE ANNUAL WELLNESS VISIT, SUBSEQUENT: Primary | ICD-10-CM

## 2025-01-08 DIAGNOSIS — N18.5 CHRONIC KIDNEY DISEASE, STAGE 5 (HCC): ICD-10-CM

## 2025-01-08 DIAGNOSIS — F41.1 GENERALIZED ANXIETY DISORDER: ICD-10-CM

## 2025-01-08 DIAGNOSIS — F33.9 DEPRESSION, RECURRENT (HCC): ICD-10-CM

## 2025-01-08 DIAGNOSIS — I50.33 ACUTE ON CHRONIC HEART FAILURE WITH PRESERVED EJECTION FRACTION (HCC): ICD-10-CM

## 2025-01-08 DIAGNOSIS — E03.9 HYPOTHYROIDISM, UNSPECIFIED TYPE: ICD-10-CM

## 2025-01-08 DIAGNOSIS — E11.9 TYPE 2 DIABETES MELLITUS WITH HEMOGLOBIN A1C GOAL OF LESS THAN 7.0% (HCC): ICD-10-CM

## 2025-01-08 DIAGNOSIS — E11.3391 MODERATE NONPROLIFERATIVE DIABETIC RETINOPATHY OF RIGHT EYE WITHOUT MACULAR EDEMA ASSOCIATED WITH TYPE 2 DIABETES MELLITUS (HCC): ICD-10-CM

## 2025-01-08 DIAGNOSIS — Z12.31 ENCOUNTER FOR SCREENING MAMMOGRAM FOR BREAST CANCER: ICD-10-CM

## 2025-01-08 DIAGNOSIS — I50.9 ACUTE ON CHRONIC CONGESTIVE HEART FAILURE, UNSPECIFIED HEART FAILURE TYPE (HCC): ICD-10-CM

## 2025-01-08 DIAGNOSIS — I10 HTN, GOAL BELOW 140/90: ICD-10-CM

## 2025-01-08 DIAGNOSIS — M54.12 CERVICAL RADICULOPATHY: ICD-10-CM

## 2025-01-08 DIAGNOSIS — I48.11 LONGSTANDING PERSISTENT ATRIAL FIBRILLATION (HCC): ICD-10-CM

## 2025-01-08 PROCEDURE — G0439 PPPS, SUBSEQ VISIT: HCPCS

## 2025-01-08 PROCEDURE — 99214 OFFICE O/P EST MOD 30 MIN: CPT

## 2025-01-08 RX ORDER — FUROSEMIDE 20 MG/1
20 TABLET ORAL DAILY
Qty: 90 TABLET | Refills: 3 | Status: SHIPPED | OUTPATIENT
Start: 2025-01-08

## 2025-01-08 RX ORDER — LORAZEPAM 0.5 MG/1
0.5 TABLET ORAL
Qty: 30 TABLET | Refills: 0 | Status: SHIPPED | OUTPATIENT
Start: 2025-01-08

## 2025-01-08 RX ORDER — FUROSEMIDE 40 MG/1
40 TABLET ORAL EVERY MORNING
Qty: 90 TABLET | Refills: 3 | Status: SHIPPED | OUTPATIENT
Start: 2025-01-08

## 2025-01-08 NOTE — ASSESSMENT & PLAN NOTE
Lab Results   Component Value Date    HGBA1C 8.1 (H) 12/16/2024     Having difficulties with Ozempic at current dosing.  0.5 was much more tolerable without side effects.  Will go back down to this.  Will follow-up pending A1c results.  Referred to clinical pharmacy today.  States she got a letter in the mail that Toujeo may not be covered anymore with her insurance.  If this is the case, she should contact office so she can continue with insulin treatment.  Orders:    Hemoglobin A1C; Future    Comprehensive metabolic panel; Future    CBC and differential; Future    semaglutide, 0.25 or 0.5 mg/dose, (Ozempic, 0.25 or 0.5 MG/DOSE,) 2 mg/3 mL injection pen; Inject 0.75 mL (0.5 mg total) under the skin every 7 days    Ambulatory referral to clinical pharmacy; Future

## 2025-01-08 NOTE — ASSESSMENT & PLAN NOTE
Wt Readings from Last 3 Encounters:   01/08/25 97.1 kg (214 lb)   11/25/24 99.3 kg (219 lb)   11/21/24 95.7 kg (211 lb)       Appears euvolemic on history and physical today.  Continue current management plan per cardiology.        Orders:    furosemide (LASIX) 40 mg tablet; Take 1 tablet (40 mg total) by mouth every morning    furosemide (LASIX) 20 mg tablet; Take 1 tablet (20 mg total) by mouth in the morning Take 40 mg in am and 20 mg in afternoon

## 2025-01-08 NOTE — PROGRESS NOTES
HCC coding opportunities          Chart Reviewed number of suggestions sent to Provider: 3  I13.0, E11.22, E11.65     Patients Insurance     Medicare Insurance: [employer]

## 2025-01-08 NOTE — ASSESSMENT & PLAN NOTE
Lab Results   Component Value Date    HGBA1C 8.1 (H) 12/16/2024     Having difficulties with Ozempic at current dosing.  0.5 was much more tolerable without side effects.  Will go back down to this.  Will follow-up pending A1c results.  Referred to clinical pharmacy today.

## 2025-01-08 NOTE — ASSESSMENT & PLAN NOTE
Will continue to monitor thyroid function.  Continue with 25 mcg daily levothyroxine.  Orders:    TSH, 3rd generation with Free T4 reflex; Future    T4; Future    T3; Future

## 2025-01-08 NOTE — ASSESSMENT & PLAN NOTE
Lab Results   Component Value Date    EGFR 64 12/16/2024    EGFR 53 (L) 06/25/2024    EGFR 73 05/06/2024    CREATININE 1 12/16/2024    CREATININE 1.1 (H) 06/25/2024    CREATININE 0.9 05/06/2024     Resolved

## 2025-01-08 NOTE — TELEPHONE ENCOUNTER
Deja from [facility] pharmacy called to clarify instructions for pt's Furosemide. I warm transferred call to Jacquelyn to assist.

## 2025-01-08 NOTE — ASSESSMENT & PLAN NOTE
Stable.  Continue current.  Orders:    LORazepam (ATIVAN) 0.5 mg tablet; Take 1 tablet (0.5 mg total) by mouth daily at bedtime as needed for anxiety

## 2025-01-08 NOTE — ASSESSMENT & PLAN NOTE
Not in acute exacerbation today.  Contact office if exacerbation occurs.  Continue fluticasone-umeclidinium-vilanterol inhaler.  Continue albuterol as needed.

## 2025-01-08 NOTE — ASSESSMENT & PLAN NOTE
Depression Screening Follow-up Plan: Patient's depression screening was positive with a PHQ-9 score of 17.   Gave patient handout with hotline for suicide.  Patient states that she would never hurt herself and has no concrete plans in doing so.  Discussed plan if this does happen with patient going to ER and/or calling a helpline and/or calling office.  Discussed with patient the various medication options and therapy options that she can go through.  Patient states that she does not want to do this today.  She is to contact office if she would like to complete this.

## 2025-01-08 NOTE — ASSESSMENT & PLAN NOTE
Continue pravastatin 20 mg every other day.  Educated on healthy diet and activity.  Will continue to monitor.  Orders:    Lipid Panel with Direct LDL reflex; Future

## 2025-01-08 NOTE — ASSESSMENT & PLAN NOTE
Anticoagulated on Eliquis and rate controlled on carvedilol.  Go to ER if signs/symptoms of this present.  Continue current management and plan per cardiology.

## 2025-01-08 NOTE — PROGRESS NOTES
Name: Alma Worley      : 1955      MRN: [id number]  Encounter Provider: Gen Patterson PA-C  Encounter Date: 2025   Encounter department: [facility]    Assessment & Plan  Medicare annual wellness visit, subsequent         Longstanding persistent atrial fibrillation (HCC)  Anticoagulated on Eliquis and rate controlled on carvedilol.  Go to ER if signs/symptoms of this present.  Continue current management and plan per cardiology.       Acute on chronic congestive heart failure, unspecified heart failure type (HCC)  Wt Readings from Last 3 Encounters:   25 97.1 kg (214 lb)   24 99.3 kg (219 lb)   24 95.7 kg (211 lb)       Appears euvolemic on history and physical today.  Continue current management plan per cardiology.        Orders:    furosemide (LASIX) 40 mg tablet; Take 1 tablet (40 mg total) by mouth every morning    furosemide (LASIX) 20 mg tablet; Take 1 tablet (20 mg total) by mouth in the morning Take 40 mg in am and 20 mg in afternoon    HTN, goal below 140/90  Continue current management plan per cardiology.       Chronic obstructive pulmonary disease, unspecified COPD type (HCC)  Not in acute exacerbation today.  Contact office if exacerbation occurs.  Continue fluticasone-umeclidinium-vilanterol inhaler.  Continue albuterol as needed.       Hypothyroidism, unspecified type  Will continue to monitor thyroid function.  Continue with 25 mcg daily levothyroxine.  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  0.5 was much more tolerable without side effects.  Will go back down to this.  Will follow-up pending A1c results.  Referred to clinical pharmacy today.        Type 2 diabetes mellitus with hemoglobin A1c goal of less than 7.0% ([facility])    Lab Results   Component Value Date    HGBA1C 8.1 (H) 12/16/2024     Having difficulties with Ozempic at current dosing.  0.5 was much more tolerable without side effects.  Will go back down to this.  Will follow-up pending A1c results.  Referred to clinical pharmacy today.  States she got a letter in the mail that Toujeo may not be covered anymore with her insurance.  If this is the case, she should contact office so she can continue with insulin treatment.  Orders:    Hemoglobin A1C; Future    Comprehensive metabolic panel; Future    CBC and differential; Future    semaglutide, 0.25 or 0.5 mg/dose, (Ozempic, 0.25 or 0.5 MG/DOSE,) 2 mg/3 mL injection pen; Inject 0.75 mL (0.5 mg total) under the skin every 7 days    Ambulatory referral to clinical pharmacy; Future    Depression, recurrent ([facility])  Depression Screening Follow-up Plan: Patient's depression screening was positive with a PHQ-9 score of 17.   Gave patient handout with hotline for suicide.  Patient states that she would never hurt herself and has no concrete plans in doing so.  Discussed plan if this does happen with patient going to ER and/or calling a helpline and/or calling office.  Discussed with patient the various medication options and therapy options that she can go through.  Patient states that she does not want to do this today.  She is to contact office if she would like to complete this.       Mixed hyperlipidemia  Continue pravastatin 20 mg every other day.  Educated on healthy diet and activity.  Will continue to monitor.  Orders:    Lipid Panel with Direct LDL reflex; Future    Morbid obesity due to excess calories (HCC)  Educated on healthy diet and activity.         Cervical radiculopathy  Continue follow-up with spine and pain management.       Encounter for screening mammogram for breast cancer  Risk/benefits discussed.  Patient declines.  Contact office if  you would like to complete.       Screening for osteoporosis  Risk/benefits discussed.  Patient declines.  Contact office if you would like to complete.       Colon cancer screening  Risk/benefits discussed.  Patient declines.  Contact office if you would like to complete.       Chronic kidney disease, stage 5 (HCC)  Lab Results   Component Value Date    EGFR 64 12/16/2024    EGFR 53 (L) 06/25/2024    EGFR 73 05/06/2024    CREATININE 1 12/16/2024    CREATININE 1.1 (H) 06/25/2024    CREATININE 0.9 05/06/2024     Resolved       Acute on chronic heart failure with preserved ejection fraction (HCC)  Wt Readings from Last 3 Encounters:   01/08/25 97.1 kg (214 lb)   11/25/24 99.3 kg (219 lb)   11/21/24 95.7 kg (211 lb)       Stable.  Continue plan and management per cardiology.              Generalized anxiety disorder   Stable.  Continue current.  Orders:    LORazepam (ATIVAN) 0.5 mg tablet; Take 1 tablet (0.5 mg total) by mouth daily at bedtime as needed for anxiety      Depression Screening and Follow-up Plan: Patient's depression screening was positive with a PHQ-9 score of 17.   Patient declines further evaluation by mental health professional and/or medications. Brief counseling provided. Will re-evaluate at next office visit.   Falls Plan of Care: balance, strength, and gait training instructions were provided.       Preventive health issues were discussed with patient, and age appropriate screening tests were ordered as noted in patient's After Visit Summary. Personalized health advice and appropriate referrals for health education or preventive services given if needed, as noted in patient's After Visit Summary.    History of Present Illness     Patient is a 69-year-old female presenting for Medicare annual wellness visit.  No questions or concerns today.  PHQ was positive and she did answer that she has thoughts she would be better off dead or hurting herself in any way.  Patient states she has no concrete plans  for this and she would not do this due to her son.  Denies CP, SOB, dizziness, syncope, leg swelling, VALERIO, abdominal distention.       Patient Care Team:  Gen Patterson PA-C as PCP - General (Family Medicine)  Reece Sanchez MD as PCP - PCP-[facility] ([facility])  Reece Sanchez MD as PCP - Family Medicine (Family Medicine)  Vincent Hassan MD (Pain Medicine)    Review of Systems   Constitutional:  Negative for appetite change, chills, diaphoresis, fatigue and fever.   HENT:  Negative for congestion, ear discharge, ear pain, postnasal drip, rhinorrhea, sinus pressure, sinus pain, sneezing and sore throat.    Eyes:  Negative for pain, discharge, redness, itching and visual disturbance.   Respiratory:  Negative for apnea, cough, chest tightness, shortness of breath and wheezing.    Cardiovascular:  Negative for chest pain, palpitations and leg swelling.   Gastrointestinal:  Negative for abdominal pain, blood in stool, constipation, diarrhea, nausea and vomiting.   Endocrine: Negative for cold intolerance, heat intolerance, polydipsia and polyuria.   Genitourinary:  Negative for dysuria, flank pain, frequency, hematuria and urgency.   Musculoskeletal:  Negative for arthralgias, back pain, myalgias, neck pain and neck stiffness.   Skin:  Negative for color change and rash.   Allergic/Immunologic: Negative for environmental allergies and food allergies.   Neurological:  Negative for dizziness, tremors, seizures, syncope, speech difficulty, weakness, light-headedness, numbness and headaches.   Hematological:  Negative for adenopathy. Does not bruise/bleed easily.   Psychiatric/Behavioral:  Positive for dysphoric mood. Negative for agitation, confusion, decreased concentration, hallucinations, self-injury, sleep disturbance and suicidal ideas. The patient is not nervous/anxious and is not hyperactive.    All other systems reviewed and are negative.    Medical History Reviewed by provider this encounter:  Tobacco   Allergies  Meds  Problems  Med Hx  Surg Hx  Fam Hx       Annual Wellness Visit Questionnaire   Alma is here for her Subsequent Wellness visit.     Health Risk Assessment:   Patient rates overall health as good. Patient feels that their physical health rating is same. Patient is dissatisfied with their life. Eyesight was rated as same. Hearing was rated as same. Patient feels that their emotional and mental health rating is slightly worse. Patients states they are often angry. Patient states they are often unusually tired/fatigued. Pain experienced in the last 7 days has been a lot. Patient's pain rating has been 6/10. Patient states that she has experienced no weight loss or gain in last 6 months.     Depression Screening:   PHQ-9 Score: 17      Fall Risk Screening:   In the past year, patient has experienced: history of falling in past year    Number of falls: 1  Injured during fall?: Yes    Feels unsteady when standing or walking?: Yes    Worried about falling?: Yes      Urinary Incontinence Screening:   Patient has not leaked urine accidently in the last six months.     Home Safety:  Patient has trouble with stairs inside or outside of their home. Patient has working smoke alarms and has working carbon monoxide detector. Home safety hazards include: none.     Nutrition:   Current diet is Diabetic.     Medications:   Patient is not currently taking any over-the-counter supplements. Patient is able to manage medications.     Activities of Daily Living (ADLs)/Instrumental Activities of Daily Living (IADLs):   Walk and transfer into and out of bed and chair?: Yes  Dress and groom yourself?: Yes    Bathe or shower yourself?: Yes    Feed yourself? Yes  Do your laundry/housekeeping?: Yes  Manage your money, pay your bills and track your expenses?: Yes  Make your own meals?: Yes    Do your own shopping?: Yes    Previous Hospitalizations:   Any hospitalizations or ED visits within the last 12 months?: Yes     How many hospitalizations have you had in the last year?: 1-2    Cognitive Screening:   Provider or family/friend/caregiver concerned regarding cognition?: No    PREVENTIVE SCREENINGS      Cardiovascular Screening:    General: Screening Not Indicated and History Lipid Disorder      Diabetes Screening:     General: Screening Not Indicated and History Diabetes      Colorectal Cancer Screening:     General: Risks and Benefits Discussed and Patient Declines    Due for: Colonoscopy - High Risk      Breast Cancer Screening:     General: Risks and Benefits Discussed and Patient Declines    Due for: Mammogram        Cervical Cancer Screening:    General: Screening Not Indicated      Osteoporosis Screening:    General: Risks and Benefits Discussed and Patient Declines    Due for: DXA Axial      Lung Cancer Screening:     General: Screening Not Indicated      Hepatitis C Screening:    General: Screening Current    Screening, Brief Intervention, and Referral to Treatment (SBIRT)    Screening  Typical number of drinks in a day: 0  Typical number of drinks in a week: 0  Interpretation: Low risk drinking behavior.    Single Item Drug Screening:  How often have you used an illegal drug (including marijuana) or a prescription medication for non-medical reasons in the past year? never    Single Item Drug Screen Score: 0  Interpretation: Negative screen for possible drug use disorder    Social Drivers of Health     Financial Resource Strain: Low Risk  (6/18/2024)    Received from [employer]    Financial Resource Strain     Do you have any trouble paying for your medications, or do you think you might in the future?: No   Food Insecurity: No Food Insecurity (1/8/2025)    Hunger Vital Sign     Worried About Running Out of Food in the Last Year: Never true     Ran Out of Food in the Last Year: Never true   Transportation Needs: No Transportation Needs (1/8/2025)    PRAPARE - Transportation     Lack of Transportation (Medical): No      "Lack of Transportation (Non-Medical): No   Housing Stability: Low Risk  (1/8/2025)    Housing Stability Vital Sign     Unable to Pay for Housing in the Last Year: No     Number of Times Moved in the Last Year: 1     Homeless in the Last Year: No   Utilities: Not At Risk (1/8/2025)    [facility] Utilities     Threatened with loss of utilities: No     No results found.    Objective   /72 (BP Location: Left arm, Patient Position: Sitting)   Pulse 89   Temp 98.1 °F (36.7 °C) (Tympanic)   Ht 5' 2.5\" (1.588 m)   Wt 97.1 kg (214 lb)   SpO2 97%   BMI 38.52 kg/m²     Physical Exam  Vitals and nursing note reviewed.   Constitutional:       General: She is not in acute distress.     Appearance: Normal appearance. She is well-developed. She is obese. She is not ill-appearing, toxic-appearing or diaphoretic.   HENT:      Head: Normocephalic and atraumatic.      Right Ear: Tympanic membrane normal.      Left Ear: Tympanic membrane normal.      Nose: Nose normal.      Mouth/Throat:      Mouth: Mucous membranes are moist.      Pharynx: Oropharynx is clear.   Eyes:      Conjunctiva/sclera: Conjunctivae normal.      Pupils: Pupils are equal, round, and reactive to light.   Cardiovascular:      Rate and Rhythm: Normal rate and regular rhythm.      Pulses: Normal pulses.      Heart sounds: Normal heart sounds. No murmur heard.  Pulmonary:      Effort: Pulmonary effort is normal. No respiratory distress.      Breath sounds: Normal breath sounds. No wheezing.   Chest:      Chest wall: No tenderness.   Abdominal:      General: Bowel sounds are normal.      Palpations: Abdomen is soft. There is no mass.      Tenderness: There is no abdominal tenderness.   Musculoskeletal:         General: No swelling or tenderness. Normal range of motion.      Cervical back: Normal range of motion and neck supple. No tenderness.      Right lower leg: No edema.      Left lower leg: No edema.   Lymphadenopathy:      Cervical: No cervical adenopathy. "   Skin:     General: Skin is warm and dry.      Capillary Refill: Capillary refill takes less than 2 seconds.      Findings: No erythema, lesion or rash.   Neurological:      General: No focal deficit present.      Mental Status: She is alert and oriented to person, place, and time. Mental status is at baseline.      Motor: No weakness.      Coordination: Coordination normal.      Gait: Gait normal.   Psychiatric:         Mood and Affect: Mood normal.         Behavior: Behavior normal.         Thought Content: Thought content normal.         Judgment: Judgment normal.

## 2025-01-08 NOTE — ASSESSMENT & PLAN NOTE
Wt Readings from Last 3 Encounters:   01/08/25 97.1 kg (214 lb)   11/25/24 99.3 kg (219 lb)   11/21/24 95.7 kg (211 lb)       Stable.  Continue plan and management per cardiology.

## 2025-01-08 NOTE — PATIENT INSTRUCTIONS
Medicare Preventive Visit Patient Instructions  Thank you for completing your Welcome to Medicare Visit or Medicare Annual Wellness Visit today. Your next wellness visit will be due in one year (1/9/2026).  The screening/preventive services that you may require over the next 5-10 years are detailed below. Some tests may not apply to you based off risk factors and/or age. Screening tests ordered at today's visit but not completed yet may show as past due. Also, please note that scanned in results may not display below.  Preventive Screenings:  Service Recommendations Previous Testing/Comments   Colorectal Cancer Screening  * Colonoscopy    * Fecal Occult Blood Test (FOBT)/Fecal Immunochemical Test (FIT)  * Fecal DNA/Cologuard Test  * Flexible Sigmoidoscopy Age: 45-75 years old   Colonoscopy: every 10 years (may be performed more frequently if at higher risk)  OR  FOBT/FIT: every 1 year  OR  Cologuard: every 3 years  OR  Sigmoidoscopy: every 5 years  Screening may be recommended earlier than age 45 if at higher risk for colorectal cancer. Also, an individualized decision between you and your healthcare provider will decide whether screening between the ages of 76-85 would be appropriate. Colonoscopy: Not on file  FOBT/FIT: Not on file  Cologuard: Not on file  Sigmoidoscopy: Not on file          Breast Cancer Screening Age: 40+ years old  Frequency: every 1-2 years  Not required if history of left and right mastectomy Mammogram: Not on file        Cervical Cancer Screening Between the ages of 21-29, pap smear recommended once every 3 years.   Between the ages of 30-65, can perform pap smear with HPV co-testing every 5 years.   Recommendations may differ for women with a history of total hysterectomy, cervical cancer, or abnormal pap smears in past. Pap Smear: Not on file        Hepatitis C Screening Once for adults born between 1945 and 1965  More frequently in patients at high risk for Hepatitis C Hep C Antibody:  09/11/2023        Diabetes Screening 1-2 times per year if you're at risk for diabetes or have pre-diabetes Fasting glucose: No results in last 5 years (No results in last 5 years)  A1C: 8.1 % (12/16/2024)      Cholesterol Screening Once every 5 years if you don't have a lipid disorder. May order more often based on risk factors. Lipid panel: 09/11/2023          Other Preventive Screenings Covered by Medicare:  Abdominal Aortic Aneurysm (AAA) Screening: covered once if your at risk. You're considered to be at risk if you have a family history of AAA.  Lung Cancer Screening: covers low dose CT scan once per year if you meet all of the following conditions: (1) Age 55-77; (2) No signs or symptoms of lung cancer; (3) Current smoker or have quit smoking within the last 15 years; (4) You have a tobacco smoking history of at least 20 pack years (packs per day multiplied by number of years you smoked); (5) You get a written order from a healthcare provider.  Glaucoma Screening: covered annually if you're considered high risk: (1) You have diabetes OR (2) Family history of glaucoma OR (3)  aged 50 and older OR (4)  American aged 65 and older  Osteoporosis Screening: covered every 2 years if you meet one of the following conditions: (1) You're estrogen deficient and at risk for osteoporosis based off medical history and other findings; (2) Have a vertebral abnormality; (3) On glucocorticoid therapy for more than 3 months; (4) Have primary hyperparathyroidism; (5) On osteoporosis medications and need to assess response to drug therapy.   Last bone density test (DXA Scan): Not on file.  HIV Screening: covered annually if you're between the age of 15-65. Also covered annually if you are younger than 15 and older than 65 with risk factors for HIV infection. For pregnant patients, it is covered up to 3 times per pregnancy.    Immunizations:  Immunization Recommendations   Influenza Vaccine Annual influenza  vaccination during flu season is recommended for all persons aged >= 6 months who do not have contraindications   Pneumococcal Vaccine   * Pneumococcal conjugate vaccine = PCV13 (Prevnar 13), PCV15 (Vaxneuvance), PCV20 (Prevnar 20)  * Pneumococcal polysaccharide vaccine = PPSV23 (Pneumovax) Adults 19-63 yo with certain risk factors or if 65+ yo  If never received any pneumonia vaccine: recommend Prevnar 20 (PCV20)  Give PCV20 if previously received 1 dose of PCV13 or PPSV23   Hepatitis B Vaccine 3 dose series if at intermediate or high risk (ex: diabetes, end stage renal disease, liver disease)   Respiratory syncytial virus (RSV) Vaccine - COVERED BY MEDICARE PART D  * RSVPreF3 (Arexvy) CDC recommends that adults 60 years of age and older may receive a single dose of RSV vaccine using shared clinical decision-making (SCDM)   Tetanus (Td) Vaccine - COST NOT COVERED BY MEDICARE PART B Following completion of primary series, a booster dose should be given every 10 years to maintain immunity against tetanus. Td may also be given as tetanus wound prophylaxis.   Tdap Vaccine - COST NOT COVERED BY MEDICARE PART B Recommended at least once for all adults. For pregnant patients, recommended with each pregnancy.   Shingles Vaccine (Shingrix) - COST NOT COVERED BY MEDICARE PART B  2 shot series recommended in those 19 years and older who have or will have weakened immune systems or those 50 years and older     Health Maintenance Due:      Topic Date Due   • Breast Cancer Screening: Mammogram  Never done   • Colorectal Cancer Screening  Never done   • Hepatitis C Screening  Completed     Immunizations Due:      Topic Date Due   • COVID-19 Vaccine (1 - 2024-25 season) Never done     Advance Directives   What are advance directives?  Advance directives are legal documents that state your wishes and plans for medical care. These plans are made ahead of time in case you lose your ability to make decisions for yourself. Advance  directives can apply to any medical decision, such as the treatments you want, and if you want to donate organs.   What are the types of advance directives?  There are many types of advance directives, and each state has rules about how to use them. You may choose a combination of any of the following:  Living will:  This is a written record of the treatment you want. You can also choose which treatments you do not want, which to limit, and which to stop at a certain time. This includes surgery, medicine, IV fluid, and tube feedings.   Durable power of  for healthcare (DPAHC):  This is a written record that states who you want to make healthcare choices for you when you are unable to make them for yourself. This person, called a proxy, is usually a family member or a friend. You may choose more than 1 proxy.  Do not resuscitate (DNR) order:  A DNR order is used in case your heart stops beating or you stop breathing. It is a request not to have certain forms of treatment, such as CPR. A DNR order may be included in other types of advance directives.  Medical directive:  This covers the care that you want if you are in a coma, near death, or unable to make decisions for yourself. You can list the treatments you want for each condition. Treatment may include pain medicine, surgery, blood transfusions, dialysis, IV or tube feedings, and a ventilator (breathing machine).  Values history:  This document has questions about your views, beliefs, and how you feel and think about life. This information can help others choose the care that you would choose.  Why are advance directives important?  An advance directive helps you control your care. Although spoken wishes may be used, it is better to have your wishes written down. Spoken wishes can be misunderstood, or not followed. Treatments may be given even if you do not want them. An advance directive may make it easier for your family to make difficult choices about  your care.   Weight Management   Why it is important to manage your weight:  Being overweight increases your risk of health conditions such as heart disease, high blood pressure, type 2 diabetes, and certain types of cancer. It can also increase your risk for osteoarthritis, sleep apnea, and other respiratory problems. Aim for a slow, steady weight loss. Even a small amount of weight loss can lower your risk of health problems.  How to lose weight safely:  A safe and healthy way to lose weight is to eat fewer calories and get regular exercise. You can lose up about 1 pound a week by decreasing the number of calories you eat by 500 calories each day.   Healthy meal plan for weight management:  A healthy meal plan includes a variety of foods, contains fewer calories, and helps you stay healthy. A healthy meal plan includes the following:  Eat whole-grain foods more often.  A healthy meal plan should contain fiber. Fiber is the part of grains, fruits, and vegetables that is not broken down by your body. Whole-grain foods are healthy and provide extra fiber in your diet. Some examples of whole-grain foods are whole-wheat breads and pastas, oatmeal, brown rice, and bulgur.  Eat a variety of vegetables every day.  Include dark, leafy greens such as spinach, kale, jeromy greens, and mustard greens. Eat yellow and orange vegetables such as carrots, sweet potatoes, and winter squash.   Eat a variety of fruits every day.  Choose fresh or canned fruit (canned in its own juice or light syrup) instead of juice. Fruit juice has very little or no fiber.  Eat low-fat dairy foods.  Drink fat-free (skim) milk or 1% milk. Eat fat-free yogurt and low-fat cottage cheese. Try low-fat cheeses such as mozzarella and other reduced-fat cheeses.  Choose meat and other protein foods that are low in fat.  Choose beans or other legumes such as split peas or lentils. Choose fish, skinless poultry (chicken or turkey), or lean cuts of red meat  (beef or pork). Before you cook meat or poultry, cut off any visible fat.   Use less fat and oil.  Try baking foods instead of frying them. Add less fat, such as margarine, sour cream, regular salad dressing and mayonnaise to foods. Eat fewer high-fat foods. Some examples of high-fat foods include french fries, doughnuts, ice cream, and cakes.  Eat fewer sweets.  Limit foods and drinks that are high in sugar. This includes candy, cookies, regular soda, and sweetened drinks.  Exercise:  Exercise at least 30 minutes per day on most days of the week. Some examples of exercise include walking, biking, dancing, and swimming. You can also fit in more physical activity by taking the stairs instead of the elevator or parking farther away from stores. Ask your healthcare provider about the best exercise plan for you.      © Copyright [employer] 2018 Information is for End User's use only and may not be sold, redistributed or otherwise used for commercial purposes. All illustrations and images included in CareNotes® are the copyrighted property of [employer]., Inc[employer] or [employer]

## 2025-01-09 ENCOUNTER — TELEPHONE (OUTPATIENT)
Dept: FAMILY MEDICINE CLINIC | Facility: CLINIC | Age: 70
End: 2025-01-09

## 2025-01-09 NOTE — TELEPHONE ENCOUNTER
Please contact patient to schedule Clinical Pharmacist Appointment     Reason for appointment: diabetes  When to schedule with Pharmacist: as soon as available  What should the patient bring to the appointment: med list and glucose log    Appointment Department:  [facility]  Pharmacist patient will be seeing: Anibal Tran  Visit Type Preference (ie Phone, Video, In-person): no preference   In-person visits will be at: [facility]  Virtual visits will be completed via [employer] or Phone - please clarify patient preference in appointment notes    Please respond to this note to keep track of the number of patient outreaches.     Please try to reach out to patient on 2 separate days

## 2025-01-10 ENCOUNTER — TELEMEDICINE (OUTPATIENT)
Dept: FAMILY MEDICINE CLINIC | Facility: CLINIC | Age: 70
End: 2025-01-10

## 2025-01-10 DIAGNOSIS — E11.9 TYPE 2 DIABETES MELLITUS WITH HEMOGLOBIN A1C GOAL OF LESS THAN 7.0% (HCC): Primary | ICD-10-CM

## 2025-01-10 PROCEDURE — PBNCHG PB NO CHARGE PLACEHOLDER: Performed by: PHARMACIST

## 2025-01-10 NOTE — PROGRESS NOTES
[facility] Clinical Pharmacy Services  Anibal Tran    Administrative Statements   Encounter provider Anibal Tran    The Patient is located at Home and in the following state in which I hold an active license [place].    The patient was identified by name and date of birth. Alma Worley was informed that this is a telemedicine visit and that the visit is being conducted through Telephone.  My office door was closed. No one else was in the room.  She acknowledged consent and understanding of privacy and security of the video platform. The patient has agreed to participate and understands they can discontinue the visit at any time.    Assessment/ Plan     Assessment & Plan  Type 2 diabetes mellitus with hemoglobin A1c goal of less than 7.0% (HCC)    Lab Results   Component Value Date    HGBA1C 8.1 (H) 12/16/2024   Goal A1c <7% .   Complications:  Microvascular complications:retinopathy, CKD  Macrovascular complications: CAD, HF  Current Diabetes Regimen:  Ozempic 0.5 mg weekly  Toujeo 52 units dialy   Metformin XR 1000 mg BID  Historical DM Meds (reason for discontinuation):    On Additional Therapies:  Statin: yes  ACEI/ARB: yes    Assessment:  Did not tolerate higher Ozempic dose of 1 mg weekly. Patient to decrease to 0.5 mg weekly.     Received letter in mail that Toujeo will no longer be covered under plan this year.  When I go online to her Medicare formulary for 2025 insulin glargine U300 is listed as preferred.  She will be due for refill in early February so we will send in a prescription to her pharmacy and evaluate if it needs a prior authorization.    Declined CGM in past. Offered CGM again today for closer monitoring. She declined today as well. Prefers fingerstick.     In future consider SGLT2. There would be benefits in CKD and HFpEF.     Changes to medication regimen:  Decrease Ozempic to 0.5 mg weekly as instructed by PCP  Start monitoring fingerstick glucose readings at  home           Follow-up: 3 weeks    Subjective   HPI    Medication Adherence/ Tolerability/ Cost:  Patient denies side effects, no issues with cost, 0 missed doses in last two week  losartan  metFORMIN  pravastatin  semaglutide 1 mg weekly- Increased around Nov. No issues leading up to that. Increased  After tere had experienced diarrhea for over 72 hours. Ate on tere mashed potatoes, small ham, sweet potatoes, carrots, peas, and small piece of cake.  spironolactone  Toudarcy SoloStar Sopn 52 units daily    Approved for PACENET    Review of Systems     2. Lifestyle:       3. Home monitoring devices  Glucometer: Yes, Brand:   Continuous Glucose Monitor: No, Brand:     Objective       Blood Sugar Readings  The patient is currently checking blood glucose 0 times per day. Patient does not report with SMBG logs.      ASCVD Risk:  The ASCVD Risk score (Melita SIERRA, et al., 2019) failed to calculate for the following reasons:    Risk score cannot be calculated because patient has a medical history suggesting prior/existing ASCVD     Vitals:  There were no vitals filed for this visit.    Eye Exam:    Lab Results   Component Value Date    LEFTDIABRET Positive 11/12/2024    RIGHTDIABRET Positive 11/12/2024       Labs:  Lab Results   Component Value Date    SODIUM 140 12/16/2024    K 5 12/16/2024    EGFR 64 12/16/2024    CREATININE 1 12/16/2024    MICROALBCRE <24 12/16/2024       Lab Results   Component Value Date    HGBA1C 8.1 (H) 12/16/2024    HGBA1C 8.1 (A) 11/21/2024    HGBA1C 7.8 (A) 06/17/2024         Pharmacist Tracking Tool     Pharmacist Tracking Tool  Reason For Outreach: Embedded Pharmacist  Demographics:  Intervention Method: Phone  Type of Intervention: New  Topics Addressed: Diabetes  Pharmacologic Interventions: Prevent or Manage JANAE and Med Rec  Non-Pharmacologic Interventions: Cost, Disease state education, and Medication/Device education  Time:  Direct Patient Care:  30  mins  Care Coordination:  15   mins  Recommendation Recipient: Patient/Caregiver and Provider  Outcome: Accepted

## 2025-01-10 NOTE — ASSESSMENT & PLAN NOTE
Lab Results   Component Value Date    HGBA1C 8.1 (H) 12/16/2024   Goal A1c <7% .   Complications:  Microvascular complications:retinopathy, CKD  Macrovascular complications: CAD, HF  Current Diabetes Regimen:  Ozempic 0.5 mg weekly  Toujeo 52 units dialy   Metformin XR 1000 mg BID  Historical DM Meds (reason for discontinuation):    On Additional Therapies:  Statin: yes  ACEI/ARB: yes    Assessment:  Did not tolerate higher Ozempic dose of 1 mg weekly. Patient to decrease to 0.5 mg weekly.     Received letter in mail that Toujeo will no longer be covered under plan this year.  When I go online to her Medicare formulary for 2025 insulin glargine U300 is listed as preferred.  She will be due for refill in early February so we will send in a prescription to her pharmacy and evaluate if it needs a prior authorization.    Declined CGM in past. Offered CGM again today for closer monitoring. She declined today as well. Prefers fingerstick.     In future consider SGLT2. There would be benefits in CKD and HFpEF.     Changes to medication regimen:  Decrease Ozempic to 0.5 mg weekly as instructed by PCP  Start monitoring fingerstick glucose readings at home

## 2025-01-10 NOTE — PATIENT INSTRUCTIONS
Decrease Ozempic to 0.5 mg weekly as instructed by PCP. If you use you 1 mg dose pen, the 0.5 mg dose would be 36 clicks.   Start monitoring fingerstick glucose readings at home. Check fasting glucose once  daily

## 2025-01-23 DIAGNOSIS — I50.9 ACUTE ON CHRONIC CONGESTIVE HEART FAILURE, UNSPECIFIED HEART FAILURE TYPE (HCC): ICD-10-CM

## 2025-01-23 DIAGNOSIS — I10 HTN, GOAL BELOW 140/90: ICD-10-CM

## 2025-01-23 RX ORDER — AMLODIPINE BESYLATE 10 MG/1
10 TABLET ORAL DAILY
Qty: 30 TABLET | Refills: 5 | Status: SHIPPED | OUTPATIENT
Start: 2025-01-23

## 2025-01-23 RX ORDER — POTASSIUM CHLORIDE 1500 MG/1
20 TABLET, EXTENDED RELEASE ORAL DAILY
Qty: 30 TABLET | Refills: 5 | Status: SHIPPED | OUTPATIENT
Start: 2025-01-23

## 2025-01-28 ENCOUNTER — TRANSITIONAL CARE MANAGEMENT (OUTPATIENT)
Dept: FAMILY MEDICINE CLINIC | Facility: CLINIC | Age: 70
End: 2025-01-28

## 2025-01-28 ENCOUNTER — TELEPHONE (OUTPATIENT)
Age: 70
End: 2025-01-28

## 2025-01-28 RX ORDER — PREDNISONE 5 MG/1
TABLET ORAL
COMMUNITY
Start: 2025-01-26 | End: 2025-02-08

## 2025-01-28 RX ORDER — PROCHLORPERAZINE MALEATE 5 MG/1
5 TABLET ORAL EVERY 6 HOURS PRN
COMMUNITY
Start: 2025-01-25 | End: 2025-02-01

## 2025-01-28 NOTE — TELEPHONE ENCOUNTER
Patient called in and needs to set up a TCM appt - She was discharged on 1/25/2025.    Please call patient back at .    Thank you.

## 2025-01-30 ENCOUNTER — OFFICE VISIT (OUTPATIENT)
Dept: FAMILY MEDICINE CLINIC | Facility: CLINIC | Age: 70
End: 2025-01-30
Payer: COMMERCIAL

## 2025-01-30 VITALS
HEIGHT: 63 IN | DIASTOLIC BLOOD PRESSURE: 77 MMHG | WEIGHT: 211.2 LBS | OXYGEN SATURATION: 99 % | HEART RATE: 86 BPM | SYSTOLIC BLOOD PRESSURE: 114 MMHG | TEMPERATURE: 96.4 F | BODY MASS INDEX: 37.42 KG/M2

## 2025-01-30 DIAGNOSIS — I67.1 BRAIN ANEURYSM: ICD-10-CM

## 2025-01-30 DIAGNOSIS — H81.20 VESTIBULAR NEURONITIS, UNSPECIFIED LATERALITY: Primary | ICD-10-CM

## 2025-01-30 PROCEDURE — 99496 TRANSJ CARE MGMT HIGH F2F 7D: CPT

## 2025-01-30 NOTE — PROGRESS NOTES
Transition of Care Visit  Name: Alma Worley      : 1955      MRN: [id number]  Encounter Provider: Gen Patterson PA-C  Encounter Date: 2025   Encounter department: [facility]    Assessment & Plan  Vestibular neuronitis, unspecified laterality  Patient is to continue prednisone and Compazine.  Continue vestibular therapy and HEP.  Educated patient on anticipated progression and causes of vestibular neuronitis, and is to contact office if any worsening or no improvement.  Orders:    Ambulatory Referral to Neurology; Future    Brain aneurysm  Will refer to neurology for consult.  No signs/symptoms of CVA today.  Orders:    Ambulatory Referral to Neurology; Future         History of Present Illness     Transitional Care Management Review:   Alma Worley is a 69 y.o. female here for TCM follow up.     During the TCM phone call patient stated:  TCM Call       Date and time call was made  2025  1:35 PM    Hospital care reviewed  Records reviewed    Patient was hospitialized at  Other (comment)    Comment  [facility]    Date of Admission  25    Date of discharge  25    Diagnosis  Vestibular neuronitis    Disposition  Home    Were the patients medications reviewed and updated  Yes    Current Symptoms  None          TCM Call       Post hospital issues  None    Scheduled for follow up?  Yes    Patients specialists  Other (comment)    Other specialists names  Physical Therapy    Did you obtain your prescribed medications  Yes    Do you need help managing your prescriptions or medications  No    Is transportation to your appointment needed  No    Living Arrangements  Spouse or Significiant other    Support System  Family; Spouse    The type of support provided  Emotional; Financial; Physical    Do you have social support  Yes, as much as I need    Are you recieving any outpatient services  No    Are you recieving home care services  No    Are you using any  community resources  No    Current waiver services  No    Have you fallen in the last 12 months  Yes    Interperter language line needed  No    Counseling  Patient          Patient is a 69-year-old female presenting for TCM.  Patient states that since her hospital stay, she has had no symptoms of vestibular neuronitis with dizziness or nausea except for today when she had it for 1 second.  States it went right away.  Currently on prednisone and Compazine.  Did go to vestibular therapy yesterday, and will be doing home exercise program at this point.  Denies facial drooping, slurring of speech, headaches, abnormal gait, numbness/tingling.    Hospital course:  This is a brief description of the patient's hospital stay; please refer to medical chart for further details. Alma Worley is 69 y.o. female with past medical history as mentioned above . She presented to [facility] on 1/24/2025 with complaint of Vestibular neuritis    She was diagnosed with Vestibular neuritisand  Patient Active Problem List   Diagnosis   Vestibular neuritis, unspecified laterality   Paroxysmal A-fib (HCC)   Hypertension   Other specified hypothyroidism   MOON on CPAP   Type 2 diabetes mellitus without complication, with long-term current use of insulin ([facility])   Vestibular neuritis   Hypomagnesemia   A 69-year-old lady with a history of paroxysmal AF, hypertension, hypothyroidism, MOON, type 2 diabetes mellitus with long-term concurrent use of insulin, COPD, chronic heart failure with preserved ejection fraction and history of CAD presented to ED with dizziness and vomiting. She was admitted to the hospital with the diagnosis of Vestibular neuritis.   Patient was well Yesterday morning. She said that she was driving and all of a sudden she felt nauseous and dizzy. Denies chest pain, palpitation, short of breath, fever or chills.     Patient has a history of Chronic bilateral sensorineural hearing loss. On admission, we ordered imagings to rule out  acute causes. All the imagings are negative. Normal cerebellar tags and normal neuro exam. She reports she has ringing in the ears several times. She was seen and examined by me today at bedside. She reports feeling better and she can sit up. Currently waiting for PT OT evaluation. She was recommended to do vestibular physical therapy as outpatient. Electrolytes will check. Replacement was done by me.    She was advised to continue all of her home medications to stabilize her medical problem.         Review of Systems   Constitutional:  Negative for appetite change, chills, diaphoresis, fatigue and fever.   HENT:  Negative for congestion, ear discharge, ear pain, postnasal drip, rhinorrhea, sinus pressure, sinus pain, sneezing and sore throat.    Eyes:  Negative for pain, discharge, redness, itching and visual disturbance.   Respiratory:  Negative for apnea, cough, chest tightness, shortness of breath and wheezing.    Cardiovascular:  Negative for chest pain, palpitations and leg swelling.   Gastrointestinal:  Negative for abdominal pain, blood in stool, constipation, diarrhea, nausea and vomiting.   Endocrine: Negative for cold intolerance, heat intolerance, polydipsia and polyuria.   Genitourinary:  Negative for dysuria, flank pain, frequency, hematuria and urgency.   Musculoskeletal:  Negative for arthralgias, back pain, myalgias, neck pain and neck stiffness.   Skin:  Negative for color change and rash.   Allergic/Immunologic: Negative for environmental allergies and food allergies.   Neurological:  Negative for dizziness, tremors, seizures, syncope, speech difficulty, weakness, light-headedness, numbness and headaches.   Hematological:  Negative for adenopathy. Does not bruise/bleed easily.   Psychiatric/Behavioral:  Negative for agitation, confusion, decreased concentration, dysphoric mood, hallucinations, self-injury, sleep disturbance and suicidal ideas. The patient is not nervous/anxious and is not  "hyperactive.    All other systems reviewed and are negative.    Objective   /77 (BP Location: Left arm, Patient Position: Sitting)   Pulse 86   Temp (!) 96.4 °F (35.8 °C) (Tympanic)   Ht 5' 2.5\" (1.588 m)   Wt 95.8 kg (211 lb 3.2 oz)   SpO2 99%   BMI 38.01 kg/m²     Physical Exam  Vitals and nursing note reviewed.   Constitutional:       General: She is not in acute distress.     Appearance: Normal appearance. She is well-developed. She is obese. She is not ill-appearing, toxic-appearing or diaphoretic.   HENT:      Head: Normocephalic and atraumatic.      Right Ear: Tympanic membrane normal.      Left Ear: Tympanic membrane normal.      Nose: Nose normal.      Mouth/Throat:      Mouth: Mucous membranes are moist.      Pharynx: Oropharynx is clear.   Eyes:      Conjunctiva/sclera: Conjunctivae normal.      Pupils: Pupils are equal, round, and reactive to light.   Cardiovascular:      Rate and Rhythm: Normal rate and regular rhythm.      Pulses: Normal pulses.      Heart sounds: Normal heart sounds. No murmur heard.  Pulmonary:      Effort: Pulmonary effort is normal. No respiratory distress.      Breath sounds: Normal breath sounds. No wheezing.   Chest:      Chest wall: No tenderness.   Abdominal:      General: Bowel sounds are normal.      Palpations: Abdomen is soft. There is no mass.      Tenderness: There is no abdominal tenderness.   Musculoskeletal:         General: No swelling or tenderness.      Cervical back: Normal range of motion and neck supple. No tenderness.      Right lower leg: No edema.      Left lower leg: No edema.   Lymphadenopathy:      Cervical: No cervical adenopathy.   Skin:     General: Skin is warm and dry.      Capillary Refill: Capillary refill takes less than 2 seconds.      Findings: No erythema, lesion or rash.   Neurological:      General: No focal deficit present.      Mental Status: She is alert and oriented to person, place, and time. Mental status is at baseline.      " Cranial Nerves: No cranial nerve deficit.      Sensory: No sensory deficit.      Motor: No weakness.      Coordination: Coordination normal.      Gait: Gait normal.      Deep Tendon Reflexes: Reflexes normal.   Psychiatric:         Mood and Affect: Mood normal.         Behavior: Behavior normal.         Thought Content: Thought content normal.         Judgment: Judgment normal.       Medications have been reviewed by provider in current encounter

## 2025-01-31 ENCOUNTER — TELEMEDICINE (OUTPATIENT)
Dept: FAMILY MEDICINE CLINIC | Facility: CLINIC | Age: 70
End: 2025-01-31

## 2025-01-31 DIAGNOSIS — E11.9 TYPE 2 DIABETES MELLITUS WITH HEMOGLOBIN A1C GOAL OF LESS THAN 7.0% (HCC): Primary | ICD-10-CM

## 2025-01-31 DIAGNOSIS — E11.9 TYPE 2 DIABETES MELLITUS WITH HEMOGLOBIN A1C GOAL OF LESS THAN 7.0% (HCC): ICD-10-CM

## 2025-01-31 PROCEDURE — PBNCHG PB NO CHARGE PLACEHOLDER: Performed by: PHARMACIST

## 2025-01-31 RX ORDER — BLOOD-GLUCOSE METER
KIT MISCELLANEOUS
Qty: 1 KIT | Refills: 0 | Status: SHIPPED | OUTPATIENT
Start: 2025-01-31

## 2025-01-31 RX ORDER — INSULIN GLARGINE 300 U/ML
52 INJECTION, SOLUTION SUBCUTANEOUS DAILY
Qty: 13.5 ML | Refills: 1 | Status: SHIPPED | OUTPATIENT
Start: 2025-01-31

## 2025-01-31 RX ORDER — DULAGLUTIDE 0.75 MG/.5ML
0.75 INJECTION, SOLUTION SUBCUTANEOUS WEEKLY
Qty: 2 ML | Refills: 1 | Status: SHIPPED | OUTPATIENT
Start: 2025-01-31

## 2025-01-31 NOTE — ASSESSMENT & PLAN NOTE
Lab Results   Component Value Date    HGBA1C 8.1 (H) 12/16/2024     Goal A1c <7% .   Complications:  Microvascular complications:retinopathy, CKD  Macrovascular complications: CAD, HF  Current Diabetes Regimen:  Ozempic 0.5 mg weekly  Toujeo 52 units dialy   Metformin XR 1000 mg BID  Historical DM Meds (reason for discontinuation):  Ozempic- diarrhea/ vomiting   Jardiance (Frecurrent yeast infections)  On Additional Therapies:  Statin: yes  ACEI/ARB: yes    Assessment:  Had episode of vertigo and was hospitalized and given 10 days of prednisone and compazine. She is getting vestibular therapy.     Did not tolerate higher Ozempic even at lower dose. She did well on trulicity in the past but had to switch due to medication shortages. She is open to trying this again. If she is unable to be on GLP-1 therapy then she is willing to consider restarting meal time insulin.     She does have a hx of CKD , HF, and CAD and would benefit from SGLT2 but patient will not take due to recurrent yeast infections in past on Jardiance.     Changes to medication regimen:  Discontinue Ozempic  Will re-try Trulicity 0.75 mg weekly     Orders:    Ambulatory referral to clinical pharmacy    Dulaglutide (Trulicity) 0.75 MG/0.5ML SOAJ; Inject 0.75 mg under the skin once a week    insulin glargine (Toujeo SoloStar) 300 units/mL CONCENTRATED U-300 injection pen (1-unit dial); Inject 52 Units under the skin daily

## 2025-01-31 NOTE — PROGRESS NOTES
[facility]  Anibal Tran    Administrative Statements   Encounter provider Anibal Tran    The Patient is located at Home and in the following state in which I hold an active license [place].    The patient was identified by name and date of birth. Alma Worley was informed that this is a telemedicine visit and that the visit is being conducted through Telephone.  My office door was closed. No one else was in the room.  She acknowledged consent and understanding of privacy and security of the video platform. The patient has agreed to participate and understands they can discontinue the visit at any time.    Assessment/ Plan     Assessment & Plan  Type 2 diabetes mellitus with hemoglobin A1c goal of less than 7.0% (HCC)    Lab Results   Component Value Date    HGBA1C 8.1 (H) 12/16/2024     Goal A1c <7% .   Complications:  Microvascular complications:retinopathy, CKD  Macrovascular complications: CAD, HF  Current Diabetes Regimen:  Ozempic 0.5 mg weekly  Toujeo 52 units dialy   Metformin XR 1000 mg BID  Historical DM Meds (reason for discontinuation):  Ozempic- diarrhea/ vomiting   Jardiance (Frecurrent yeast infections)  On Additional Therapies:  Statin: yes  ACEI/ARB: yes    Assessment:  Had episode of vertigo and was hospitalized and given 10 days of prednisone and compazine. She is getting vestibular therapy.     Did not tolerate higher Ozempic even at lower dose. She did well on trulicity in the past but had to switch due to medication shortages. She is open to trying this again. If she is unable to be on GLP-1 therapy then she is willing to consider restarting meal time insulin.     She does have a hx of CKD , HF, and CAD and would benefit from SGLT2 but patient will not take due to recurrent yeast infections in past on Jardiance.     Changes to medication regimen:  Discontinue Ozempic  Will re-try Trulicity 0.75 mg weekly     Orders:    Ambulatory referral to  clinical pharmacy    Dulaglutide (Trulicity) 0.75 MG/0.5ML SOAJ; Inject 0.75 mg under the skin once a week    insulin glargine (Toujeo SoloStar) 300 units/mL CONCENTRATED U-300 injection pen (1-unit dial); Inject 52 Units under the skin daily      Follow-up: 3 weeks    Subjective   HPI    Medication Adherence/ Tolerability/ Cost:  losartan  metFORMIN - 2 tabs BID  pravastatin  Completely stopped Ozempic due to side effects  spironolactone  Toujeo SoloStar Sopn 52 units daily    Approved for [employer]    Review of Systems     2. Lifestyle:       3. Home monitoring devices  Glucometer: Yes, Brand:   Continuous Glucose Monitor: No, Brand:     Objective         Blood Glucose Readings  The patient is currently checking blood glucose 0 times per day. Patient does not report with SMBG logs.        ASCVD Risk:  The ASCVD Risk score (Melita SIERRA, et al., 2019) failed to calculate for the following reasons:    Risk score cannot be calculated because patient has a medical history suggesting prior/existing ASCVD     Vitals:  There were no vitals filed for this visit.    Eye Exam:    Lab Results   Component Value Date    LEFTDIABRET Positive 11/12/2024    RIGHTDIABRET Positive 11/12/2024       Labs:  Lab Results   Component Value Date    SODIUM 136 01/25/2025    K 5.1 01/25/2025    EGFR 76 01/25/2025    CREATININE 0.84 01/25/2025    MICROALBCRE <24 12/16/2024       Lab Results   Component Value Date    HGBA1C 8.1 (H) 12/16/2024    HGBA1C 8.1 (A) 11/21/2024    HGBA1C 7.8 (A) 06/17/2024         Pharmacist Tracking Tool     Pharmacist Tracking Tool  Reason For Outreach: Embedded Pharmacist  Demographics:  Intervention Method: Phone  Type of Intervention: Follow-Up  Topics Addressed: Diabetes  Pharmacologic Interventions: Medication Initiation, Medication Discontinuation, Prevent or Manage JANAE, and Med Rec  Non-Pharmacologic Interventions: Adherence addressed, Cost, Disease state education, and Medication/Device  education  Time:  Direct Patient Care:  20  mins  Care Coordination:  15  mins  Recommendation Recipient: Patient/Caregiver and Provider  Outcome: Accepted

## 2025-01-31 NOTE — TELEPHONE ENCOUNTER
Pt states she accidentally knocked her monitor off the table and stepped on it and now it doesn't work. Please address    Reason for call:   [x] Refill   [] Prior Auth  [] Other:     Office:   [x] PCP/Provider - [facility]   [] Specialty/Provider -     Medication:   ~ Blood Glucose Monitoring Suppl (OneTouch Verio Reflect) w/Device KIT - Check blood sugars once daily. Please substitute with appropriate alternative as covered by patient's insurance. Dx: E11.65     Pharmacy:   [facility] [address]     Does the patient have enough for 3 days?   [] Yes   [x] No - Send as HP to POD

## 2025-02-05 ENCOUNTER — RA CDI HCC (OUTPATIENT)
Dept: OTHER | Facility: HOSPITAL | Age: 70
End: 2025-02-05

## 2025-02-05 NOTE — PROGRESS NOTES
HCC coding opportunities          Chart Reviewed number of suggestions sent to Provider: 3   E11.22, E11.65, I13.0    Patients Insurance     Medicare Insurance: [employer]

## 2025-02-10 DIAGNOSIS — E11.9 TYPE 2 DIABETES MELLITUS WITH HEMOGLOBIN A1C GOAL OF LESS THAN 7.0% (HCC): ICD-10-CM

## 2025-02-10 DIAGNOSIS — F41.1 GENERALIZED ANXIETY DISORDER: ICD-10-CM

## 2025-02-11 RX ORDER — LORAZEPAM 0.5 MG/1
0.5 TABLET ORAL
Qty: 30 TABLET | Refills: 0 | Status: SHIPPED | OUTPATIENT
Start: 2025-02-11

## 2025-02-11 RX ORDER — BLOOD SUGAR DIAGNOSTIC
STRIP MISCELLANEOUS
Qty: 100 EACH | Refills: 1 | Status: SHIPPED | OUTPATIENT
Start: 2025-02-11

## 2025-02-11 NOTE — TELEPHONE ENCOUNTER
Medication:  PDMP not checked  Active agreement on file -No         
Reason for call:   [x] Refill   [] Prior Auth  [] Other:     Office:   [x] PCP/Provider - Gen Patterson   [] Specialty/Provider -     Medication: lorazepam 0.5 mg take 1 tablet at bedtime as needed #30     One touch verio test strips- pt stated she's been testing her sugars 3 times a day due to being on prednisone     Pharmacy: [facility] on [address]     Does the patient have enough for 3 days?   [x] Yes   [] No - Send as HP to POD    
3

## 2025-02-20 NOTE — PROGRESS NOTES
[facility]  Anibal Tran    Administrative Statements   Encounter provider Anibal Tran    The Patient is located at Home and in the following state in which I hold an active license [place].    The patient was identified by name and date of birth. Alma Worley was informed that this is a telemedicine visit and that the visit is being conducted through Telephone.  My office door was closed. No one else was in the room.  She acknowledged consent and understanding of privacy and security of the video platform. The patient has agreed to participate and understands they can discontinue the visit at any time.    Assessment/ Plan     Assessment & Plan  Type 2 diabetes mellitus with hemoglobin A1c goal of less than 7.0% (HCC)    Lab Results   Component Value Date    HGBA1C 8.1 (H) 12/16/2024     Goal A1c <7% .   Complications:  Microvascular complications:retinopathy, CKD  Macrovascular complications: CAD, HF  Current Diabetes Regimen:  Trulicity 0.75 mg weekly   Toujeo 52 units dialy   Metformin XR 1000 mg BID  Historical DM Meds (reason for discontinuation):  Ozempic- diarrhea/ vomiting   Jardiance (Frecurrent yeast infections)  On Additional Therapies:  Statin: yes  ACEI/ARB: yes    Assessment:  Recent start on Trulicity. Tolerating without side effects. Fasting average is ~130 and before dinner ~180 mg/dL.     She did get a letter from insurance saying Toujeo U-300 not covered on plan. I went to plan website and they prefer generic version insulin glargine U-300.     She does have a hx of CKD , HF, and CAD and would benefit from SGLT2 but patient will not take due to recurrent yeast infections in past on Jardiance.     Changes to medication regimen:   Continue Trulicity 0.75 mg weekly, insulin glargine U-300 52 units daily, metformin XR  1000 mg BID    Orders:    Insulin Glargine Max SoloStar 300 UNIT/ML SOPN; Inject 52 Units under the skin in the  morning      Follow-up: 6 weeks    Subjective   HPI    Medication Adherence/ Tolerability/ Cost:  losartan  metFORMIN - 2 tabs BID  pravastatin  Completely stopped Ozempic due to side effects  spironolactone  Toudarcy [place]eroStar Sopn 52 units daily  Trulicity - started last Sunday 0.75 mg weekly. Was nauseous with first day but this resolved and now tolerating without problems    Approved for [employer]    Review of Systems     2. Lifestyle:       3. Home monitoring devices  Glucometer: Yes, Brand:   Continuous Glucose Monitor: No, Brand:     Objective     Blood Glucose Readings  The patient is currently checking blood glucose 1 times per day. Patient reports with SMBG logs.    Date AM Dinner Comments   2/17  203    2/18 115 194    2/19 138 199    2/20 137 149    2/21 137     Avg 131 186        ASCVD Risk:  The ASCVD Risk score (Melita SIERRA, et al., 2019) failed to calculate for the following reasons:    Risk score cannot be calculated because patient has a medical history suggesting prior/existing ASCVD     Vitals:  There were no vitals filed for this visit.    Eye Exam:    Lab Results   Component Value Date    LEFTDIABRET Positive 11/12/2024    RIGHTDIABRET Positive 11/12/2024       Labs:  Lab Results   Component Value Date    SODIUM 136 01/25/2025    K 5.1 01/25/2025    EGFR 76 01/25/2025    CREATININE 0.84 01/25/2025    MICROALBCRE <24 12/16/2024       Lab Results   Component Value Date    HGBA1C 8.1 (H) 12/16/2024    HGBA1C 8.1 (A) 11/21/2024    HGBA1C 7.8 (A) 06/17/2024         Pharmacist Tracking Tool     Pharmacist Tracking Tool  Reason For Outreach: Embedded Pharmacist  Demographics:  Intervention Method: Phone  Type of Intervention: Follow-Up  Topics Addressed: Diabetes  Pharmacologic Interventions: Medication Conversion and Med Rec  Non-Pharmacologic Interventions: Care coordination, Disease state education, and Medication/Device education  Time:  Direct Patient Care:  20  mins  Care Coordination:  10   mins  Recommendation Recipient: Patient/Caregiver and Provider  Outcome: Accepted

## 2025-02-20 NOTE — ASSESSMENT & PLAN NOTE
Lab Results   Component Value Date    HGBA1C 8.1 (H) 12/16/2024     Goal A1c <7% .   Complications:  Microvascular complications:retinopathy, CKD  Macrovascular complications: CAD, HF  Current Diabetes Regimen:  Trulicity 0.75 mg weekly   Toujeo 52 units dialy   Metformin XR 1000 mg BID  Historical DM Meds (reason for discontinuation):  Ozempic- diarrhea/ vomiting   Jardiance (Frecurrent yeast infections)  On Additional Therapies:  Statin: yes  ACEI/ARB: yes    Assessment:  Recent start on Trulicity. Tolerating without side effects. Fasting average is ~130 and before dinner ~180 mg/dL.     She did get a letter from insurance saying Toujeo U-300 not covered on plan. I went to plan website and they prefer generic version insulin glargine U-300.     She does have a hx of CKD , HF, and CAD and would benefit from SGLT2 but patient will not take due to recurrent yeast infections in past on Jardiance.     Changes to medication regimen:   Continue Trulicity 0.75 mg weekly, insulin glargine U-300 52 units daily, metformin XR  1000 mg BID    Orders:    Insulin Glargine Max SoloStar 300 UNIT/ML SOPN; Inject 52 Units under the skin in the morning

## 2025-02-21 ENCOUNTER — TELEMEDICINE (OUTPATIENT)
Dept: FAMILY MEDICINE CLINIC | Facility: CLINIC | Age: 70
End: 2025-02-21

## 2025-02-21 DIAGNOSIS — E11.9 TYPE 2 DIABETES MELLITUS WITH HEMOGLOBIN A1C GOAL OF LESS THAN 7.0% (HCC): Primary | ICD-10-CM

## 2025-02-21 PROCEDURE — PBNCHG PB NO CHARGE PLACEHOLDER: Performed by: PHARMACIST

## 2025-02-21 RX ORDER — INSULIN GLARGINE 300 [IU]/ML
52 INJECTION, SOLUTION SUBCUTANEOUS DAILY
Qty: 18 ML | Refills: 1 | Status: SHIPPED | OUTPATIENT
Start: 2025-02-21

## 2025-03-05 ENCOUNTER — OFFICE VISIT (OUTPATIENT)
Dept: PAIN MEDICINE | Facility: CLINIC | Age: 70
End: 2025-03-05
Payer: COMMERCIAL

## 2025-03-05 VITALS
RESPIRATION RATE: 16 BRPM | TEMPERATURE: 98 F | WEIGHT: 211 LBS | HEART RATE: 79 BPM | OXYGEN SATURATION: 97 % | BODY MASS INDEX: 37.39 KG/M2 | HEIGHT: 63 IN

## 2025-03-05 DIAGNOSIS — G89.4 CHRONIC PAIN SYNDROME: ICD-10-CM

## 2025-03-05 DIAGNOSIS — M54.2 NECK PAIN: ICD-10-CM

## 2025-03-05 DIAGNOSIS — M54.9 MID BACK PAIN: ICD-10-CM

## 2025-03-05 DIAGNOSIS — M47.812 CERVICAL SPONDYLOSIS: ICD-10-CM

## 2025-03-05 DIAGNOSIS — M54.14 THORACIC RADICULOPATHY: Primary | ICD-10-CM

## 2025-03-05 PROCEDURE — G2211 COMPLEX E/M VISIT ADD ON: HCPCS | Performed by: ANESTHESIOLOGY

## 2025-03-05 PROCEDURE — 99214 OFFICE O/P EST MOD 30 MIN: CPT | Performed by: ANESTHESIOLOGY

## 2025-03-05 RX ORDER — PROCHLORPERAZINE MALEATE 5 MG/1
TABLET ORAL
COMMUNITY
Start: 2025-01-25

## 2025-03-05 RX ORDER — DULOXETIN HYDROCHLORIDE 20 MG/1
20 CAPSULE, DELAYED RELEASE ORAL DAILY
Qty: 30 CAPSULE | Refills: 1 | Status: SHIPPED | OUTPATIENT
Start: 2025-03-05 | End: 2025-04-04

## 2025-03-05 NOTE — PROGRESS NOTES
Assessment:  1. Thoracic radiculopathy    2. Cervical spondylosis    3. Neck pain    4. Mid back pain    5. Chronic pain syndrome        Plan:  Patient is a 69-year-old male with complaints of neck pain and mid back pain status post fall in 2011 presents to office for with chronic pain syndrome secondary to cervical degenerative disc disease, thoracic degenerative disease presents to office for follow-up visit.  MRI cervical spine shows multilevel facet arthropathy from C2-C7 with no significant foraminal stenosis.  MRI thoracic spine shows arthritis at T2-T3, T7-T8 central extrusion with mild mass effect on the cord causing mild spinal canal stenosis, right central extrusion without mass effect and left T10-T11 extrusion with mild central canal stenosis without mass effect on the spinal cord.  1.  Provide patient physical therapy cervical spine and thoracic spine strengthening  2.  We will trial Cymbalta 20 mg p.o. nightly  3.  Follow-up in 2 months to gauge response to medication and physical therapy    History of Present Illness:  The patient is a 69 y.o. female who presents for a follow up office visit in regards to Back Pain and Neck Pain.   The patient’s current symptoms include 4 out of 10 intermittent burning, throbbing pain without particular time pattern.    Current pain medications includes:  none.     I have personally reviewed and/or updated the patient's past medical history, past surgical history, family history, social history, current medications, allergies, and vital signs today.         Review of Systems  Review of Systems   Musculoskeletal:  Positive for back pain and gait problem.   All other systems reviewed and are negative.          Past Medical History:   Diagnosis Date    Anemia     CHF (congestive heart failure) (HCC)     Diabetes type 2, controlled (HCC)     Heart attack (HCC)     Hypertension        Past Surgical History:   Procedure Laterality Date    CARDIAC CATHETERIZATION       SHOULDER SURGERY Right        History reviewed. No pertinent family history.    Social History     Occupational History    Not on file   Tobacco Use    Smoking status: Former     Current packs/day: 0.25     Types: Cigarettes    Smokeless tobacco: Never   Vaping Use    Vaping status: Never Used   Substance and Sexual Activity    Alcohol use: Not Currently    Drug use: Never    Sexual activity: Not Currently         Current Outpatient Medications:     albuterol (PROVENTIL HFA,VENTOLIN HFA) 90 mcg/act inhaler, Inhale, Disp: , Rfl:     amLODIPine (NORVASC) 10 mg tablet, Take 1 tablet by mouth once daily, Disp: 30 tablet, Rfl: 5    apixaban (ELIQUIS) 5 mg, Take 1 tablet (5 mg total) by mouth 2 (two) times a day, Disp: 180 tablet, Rfl: 1    Blood Glucose Monitoring Suppl (OneTouch Verio Reflect) w/Device KIT, Check blood sugars once daily. Please substitute with appropriate alternative as covered by patient's insurance. Dx: E11.65, Disp: 1 kit, Rfl: 0    carvedilol (COREG) 25 mg tablet, Take 1 tablet (25 mg total) by mouth 2 (two) times a day with meals, Disp: 180 tablet, Rfl: 1    clopidogrel (PLAVIX) 75 mg tablet, Take 1 tablet (75 mg total) by mouth daily, Disp: 90 tablet, Rfl: 1    Dulaglutide (Trulicity) 0.75 MG/0.5ML SOAJ, Inject 0.75 mg under the skin once a week, Disp: 2 mL, Rfl: 1    fluticasone (FLONASE) 50 mcg/act nasal spray, 2 sprays into each nostril as needed, Disp: , Rfl:     fluticasone-umeclidinium-vilanterol 100-62.5-25 mcg/actuation inhaler, Inhale 1 puff daily, Disp: , Rfl:     furosemide (LASIX) 20 mg tablet, Take 1 tablet (20 mg total) by mouth in the morning Take 40 mg in am and 20 mg in afternoon, Disp: 90 tablet, Rfl: 3    furosemide (LASIX) 40 mg tablet, Take 1 tablet (40 mg total) by mouth every morning, Disp: 90 tablet, Rfl: 3    glucose blood (OneTouch Verio) test strip, Check blood sugars once daily. Please substitute with appropriate alternative as covered by patient's insurance. Dx:  E11.65, Disp: 100 each, Rfl: 1    Insulin Glargine Max SoloStar 300 UNIT/ML SOPN, Inject 52 Units under the skin in the morning, Disp: 18 mL, Rfl: 1    Insulin Pen Needle 32G X 4 MM MISC, Use daily, Disp: 100 each, Rfl: 1    isosorbide mononitrate (IMDUR) 30 mg 24 hr tablet, Take 1 tablet (30 mg total) by mouth daily, Disp: 90 tablet, Rfl: 1    levothyroxine 25 mcg tablet, Take 1 tablet (25 mcg total) by mouth daily, Disp: 90 tablet, Rfl: 3    LORazepam (ATIVAN) 0.5 mg tablet, Take 1 tablet (0.5 mg total) by mouth daily at bedtime as needed for anxiety, Disp: 30 tablet, Rfl: 0    losartan (COZAAR) 100 MG tablet, Take 100 mg by mouth daily, Disp: , Rfl:     metFORMIN (GLUCOPHAGE-XR) 500 mg 24 hr tablet, Take 2 tablets (1,000 mg total) by mouth 2 (two) times a day with meals, Disp: 360 tablet, Rfl: 1    OneTouch Delica Lancets 33G MISC, Check blood sugars once daily. Please substitute with appropriate alternative as covered by patient's insurance. Dx: E11.65, Disp: 100 each, Rfl: 3    potassium chloride (Klor-Con M20) 20 mEq tablet, Take 1 tablet by mouth once daily, Disp: 30 tablet, Rfl: 5    pravastatin (PRAVACHOL) 20 mg tablet, Take 1 tablet (20 mg total) by mouth every other day, Disp: 45 tablet, Rfl: 1    prochlorperazine (COMPAZINE) 5 mg tablet, TAKE 1 TABLET BY MOUTH EVERY 6 HOURS AS NEEDED FOR NAUSEA FOR VOMITING, Disp: , Rfl:     spironolactone (ALDACTONE) 25 mg tablet, Take 1 tablet by mouth once daily, Disp: 30 tablet, Rfl: 5    Zetia 10 MG tablet, Take 10 mg by mouth daily, Disp: , Rfl:     Fluticasone-Salmeterol (Advair) 250-50 mcg/dose inhaler, Inhale 1 puff 2 (two) times a day (Patient not taking: Reported on 11/21/2024), Disp: 60 blister, Rfl: 5    nitroglycerin (NITROSTAT) 0.4 mg SL tablet, 1 Tab Sublingual Every 5 minutes as needed for chest pain. If pain not resolved after third dose, seek emergency care (Patient not taking: Reported on 1/30/2025), Disp: , Rfl:     ondansetron (ZOFRAN) 4 mg tablet,  "Take 1 tablet (4 mg total) by mouth every 8 (eight) hours as needed for nausea or vomiting (Patient not taking: Reported on 1/30/2025), Disp: 20 tablet, Rfl: 0    Allergies   Allergen Reactions    Jardiance [Empagliflozin] Other (See Comments)     Frequent vaginal candidiasis    Lisinopril Cough       Physical Exam:    Pulse 79   Temp 98 °F (36.7 °C)   Resp 16   Ht 5' 2.5\" (1.588 m)   Wt 95.7 kg (211 lb)   SpO2 97%   BMI 37.98 kg/m²     Constitutional:normal, well developed, well nourished, alert, in no distress and non-toxic and no overt pain behavior. and obese  Eyes:anicteric  HEENT:grossly intact  Neck:supple, symmetric, trachea midline and no masses   Pulmonary:even and unlabored  Cardiovascular:No edema or pitting edema present  Skin:Normal without rashes or lesions and well hydrated  Psychiatric:Mood and affect appropriate  Neurologic:Cranial Nerves II-XII grossly intact  Musculoskeletal:antalgic    Imaging      Study Result    Narrative & Impression   MRI THORACIC SPINE WITHOUT CONTRAST     INDICATION: M54.14: Radiculopathy, thoracic region  G89.4: Chronic pain syndrome.     COMPARISON:  None.     TECHNIQUE:  Multiplanar, multisequence imaging of the thoracic spine was performed. .     IMAGE QUALITY: Diagnostic.     FINDINGS:     ALIGNMENT: Normal alignment of the thoracic spine.  No compression fracture.  No subluxation.  No scoliosis.     MARROW SIGNAL: Degenerative endplate changes are noted at T7-T8, Modic type II. No discrete marrow lesion.     THORACIC CORD: Normal signal within the thoracic cord.     PARAVERTEBRAL SOFT TISSUES:  Normal.     THORACIC DEGENERATIVE CHANGE:  T2-T3: Mild bilateral facet arthropathy without stenosis     T7-T8: Central extrusion with annular fissure exerts mild mass effect on the cord with mild central stenosis. There is no foraminal stenosis.     T8-T9: Trace bulge and prominent anterior osteophytes without stenosis.     T9-T10: Right central extrusion results in mild " central stenosis without mass effect on the cord. Facet arthropathy is mild on the right with mild right foraminal stenosis. There are Modic type II endplate changes.     T10-T11: Left central extrusion results in mild central stenosis without mass effect on the cord.     T11-T12: Small central extrusion without stenosis or mass effect on the cord.     The remainder of the individual axial levels appear normal.     OTHER FINDINGS:  None.        IMPRESSION:     1. Spondylosis is most pronounced at T7-T8 where a central extrusion with annular fissure exerts mild mass effect on the cord with mild central stenosis. There are additional disc herniations at T9-T10, T10-T11, and T11-T12 without mass effect on the   cord.  2. No fracture or subluxation seen.     Workstation performed: [id number]        CT spine cervical wo contrast  Order: [id number]  Impression    Impression:    No evidence of acute fracture of cervical spine.  Please correlate clinically and follow trauma protocol.      CT examination performed with dose lowering protocol in accordance with Ze[facility].          Workstation:[id number]  Narrative    CT examination of cervical spine was performed using contiguous axial images  without intravenous contrast. Reformatted sagittal and coronal images were  acquired.    Clinical History: Trauma    Findings:    There is straightening of cervical lordosis. There appears mild anterolisthesis  of C3-C4 and C4-C5. Odontoid process appears intact. Vertebral body heights are  maintained. There is no evidence of acute fracture of cervical spine. There is  no significant prevertebral soft tissue swelling.    There is facet joint arthrosis of right side C2-C3, C3-C4, C4-C5 and C5-C6.  Anterior endplate osteophytes are seen of 4, C5 and C6. There are slight disc  space narrowing and disc bulge of C4-C5 and C5-C6.  Exam End: 01/24/25  9:31 AM             No orders of the defined types were placed in this encounter.

## 2025-03-05 NOTE — PATIENT INSTRUCTIONS
Patient Education     Neck Pain Exercises   About this topic   The neck or cervical spine has 7 spinal bones that run from the base of your skull to the upper back. These spinal bones have discs in between them. Discs act as shock absorbers. Ligaments are strong bands of tissue that hold the bones together. Many muscles surround and attach on these bones. Nerves come off of the spinal cord and exit out of small spaces in between the spinal bones. You can have neck pain if any of these are injured or damaged. Exercises may help to make this problem better.  General   Before starting with a program, ask your doctor if you are healthy enough to do these exercises. Your doctor may have you work with a  or physical therapist to make a safe exercise program to meet your needs. You should not do the exercises if they cause sharp pains, if you feel dizzy, or if you have vision changes.  Stretching Exercises   Stretching exercises keep your muscles flexible. They also stop them from getting tight. Start by doing each of these stretches 2 to 3 times. In order for your body to make changes, you will need to hold these stretches for 20 to 30 seconds. Try to do the stretches 2 to 3 times each day. Do all exercises slowly.  Passive neck stretches:  Put your left hand on top of your head. Your other arm can be at your side or behind your back. Pull your head toward your left shoulder until you feel a gentle stretch on the right side of your neck. Repeat on the other side using your other hand.  Also, try this stretch by pulling in a diagonal direction. With your left hand on top of your head, pull your head down towards the direction of your left knee. You should feel this stretch toward the back on the right side of your neck. Repeat on the other side.  Active neck stretches:  Neck front-to-back motion ? Look down to the floor until you feel a stretch in the back of your neck. Hold. Next, look up to the ceiling until you  feel a stretch in the front of your neck. Hold.  Neck side-to-side motion ? Tilt your head to the side and bring your ear to your shoulder until you feel a stretch on the other side of your neck. Hold. Next, tilt your head to the other side until you feel a stretch. Hold.  Neck turning ? Turn only your head and look over your left shoulder until you feel stretching in the right side of your neck. Hold. Now turn only your head and look over your right shoulder until you feel a stretch in the left side of your neck. Hold.  Scalene stretches ? Grasp your head with the hand opposite the side you want to stretch. Pull your head to the side until you feel a stretch. Now, slowly turn your head so your chin is pointed upwards.  Chin tucks ? Stand straight or lie down on your back. Tuck your chin in and lengthen the back of your neck. Return to the starting position and repeat. It may help to stand up against a wall during this exercise. Try gently pushing your chin with two fingers while trying to flatten your neck against the wall. If you do this exercise lying down, try using a small rolled up washcloth under your neck. Push down into the washcloth when tucking in your chin.  Strengthening Exercises   Strengthening exercises keep your muscles firm and strong. Start by repeating each exercise 2 to 3 times. Work up to doing each exercise 10 times. Try to do the exercises 2 to 3 times each day. Hold each exercise for 3 to 5 seconds. Do all exercises slowly.  Shoulder blade squeezes ? Pinch your shoulder blades together on your upper back and hold 3 to 5 seconds. Relax.             What will the results be?   Less pain and stiffness  Better range of motion  Increased strength  Help you heal faster after an injury or surgery  Increase blood flow to a body part  Help you feel better and more relaxed  Give you more energy  More toned looking muscles  Better posture  Easier to do daily activities  Helpful tips   Stay active and  work out to keep your muscles strong and flexible.  Be sure you do not hold your breath when exercising. This can raise your blood pressure. If you tend to hold your breath, try counting out loud when exercising. If any exercise bothers you, stop right away.  Try swinging your arms at an easy pace for a few minutes to warm up your muscles. Do this again after exercising.  Doing exercises before a meal may be a good way to get into a routine.  Exercise may be slightly uncomfortable, but you should not have sharp pains. If you do get sharp pains, stop what you are doing. If the sharp pains continue, call your doctor.  Last Reviewed Date   2020-03-10  Consumer Information Use and Disclaimer   This generalized information is a limited summary of diagnosis, treatment, and/or medication information. It is not meant to be comprehensive and should be used as a tool to help the user understand and/or assess potential diagnostic and treatment options. It does NOT include all information about conditions, treatments, medications, side effects, or risks that may apply to a specific patient. It is not intended to be medical advice or a substitute for the medical advice, diagnosis, or treatment of a health care provider based on the health care provider's examination and assessment of a patient’s specific and unique circumstances. Patients must speak with a health care provider for complete information about their health, medical questions, and treatment options, including any risks or benefits regarding use of medications. This information does not endorse any treatments or medications as safe, effective, or approved for treating a specific patient. Up[employer], [employer] and its affiliates disclaim any warranty or liability relating to this information or the use thereof. The use of this information is governed by the Terms of Use, available at https://www.woltersGearworksuwer.com/en/know/clinical-effectiveness-terms   Copyright   Copyright ©  2024 [employer] and its affiliates and/or licensors. All rights reserved.

## 2025-03-10 ENCOUNTER — TELEPHONE (OUTPATIENT)
Age: 70
End: 2025-03-10

## 2025-03-10 DIAGNOSIS — F41.1 GENERALIZED ANXIETY DISORDER: ICD-10-CM

## 2025-03-10 DIAGNOSIS — E11.9 TYPE 2 DIABETES MELLITUS WITH HEMOGLOBIN A1C GOAL OF LESS THAN 7.0% (HCC): ICD-10-CM

## 2025-03-10 RX ORDER — LORAZEPAM 0.5 MG/1
0.5 TABLET ORAL
Qty: 30 TABLET | Refills: 0 | Status: SHIPPED | OUTPATIENT
Start: 2025-03-10

## 2025-03-10 RX ORDER — BLOOD SUGAR DIAGNOSTIC
STRIP MISCELLANEOUS
Qty: 100 EACH | Refills: 5 | Status: SHIPPED | OUTPATIENT
Start: 2025-03-10

## 2025-03-10 NOTE — TELEPHONE ENCOUNTER
Medication: LORazepam (ATIVAN) 0.5 mg     Dose/Frequency: 1 tablet daily at bedtime as needed     Quantity: 30    Pharmacy: [facility]    Office:   [x] PCP/Provider -   [] Speciality/Provider -     Does the patient have enough for 3 days?   [] Yes   [x] No - Send as HP to POD

## 2025-03-10 NOTE — TELEPHONE ENCOUNTER
Pt is asking if script for one touch verio test strips can be adjusted. She stated she is due for refill but is now testing 3 times a day instead of one. She would like new script sent to [facility]. Please advise, thank you.

## 2025-03-26 ENCOUNTER — VBI (OUTPATIENT)
Dept: ADMINISTRATIVE | Facility: OTHER | Age: 70
End: 2025-03-26

## 2025-03-26 DIAGNOSIS — I25.10 CORONARY ARTERY DISEASE INVOLVING NATIVE HEART WITHOUT ANGINA PECTORIS, UNSPECIFIED VESSEL OR LESION TYPE: ICD-10-CM

## 2025-03-26 NOTE — TELEPHONE ENCOUNTER
Reason for call:   [x] Refill   [] Prior Auth  [] Other:     Office:   [x] PCP/Provider - Gen Patterson PA-C   [] Specialty/Provider -     Medication: clopidogrel 75 mg    Dose/Frequency: 1 tab daily    Quantity: 90 tabs    Pharmacy: [facility] [address] [place] [address] [address] [facility]   Does the patient have enough for 3 days?   [x] Yes   [] No - Send as HP to POD    Mail Away Pharmacy   Does the patient have enough for 10 days?   [] Yes   [] No - Send as HP to POD

## 2025-03-26 NOTE — TELEPHONE ENCOUNTER
03/26/25 9:34 AM     Chart reviewed for CRC: Colonoscopy was/were not submitted to the patient's insurance.     Sophie Sanches MA   [facility]

## 2025-03-27 DIAGNOSIS — I10 HTN, GOAL BELOW 140/90: Primary | ICD-10-CM

## 2025-03-27 RX ORDER — CLOPIDOGREL BISULFATE 75 MG/1
75 TABLET ORAL DAILY
Qty: 90 TABLET | Refills: 1 | Status: SHIPPED | OUTPATIENT
Start: 2025-03-27

## 2025-03-27 RX ORDER — LOSARTAN POTASSIUM 100 MG/1
100 TABLET ORAL DAILY
Qty: 90 TABLET | Refills: 1 | Status: SHIPPED | OUTPATIENT
Start: 2025-03-27

## 2025-03-27 NOTE — TELEPHONE ENCOUNTER
Reason for call:   [x] Refill   [] Prior Auth  [] Other:     Office:   [x] PCP/Provider - Gen Patterson PA-C   [] Specialty/Provider -     Medication: losartan (COZAAR) 100 MG tablet / Take 100 mg by mouth daily     Pharmacy: [facility] [address] [facility] [address] [facility]   Does the patient have enough for 3 days?   [x] Yes   [] No - Send as HP to POD

## 2025-03-31 ENCOUNTER — RA CDI HCC (OUTPATIENT)
Dept: RADIOLOGY | Facility: HOSPITAL | Age: 70
End: 2025-03-31

## 2025-04-03 ENCOUNTER — RESULTS FOLLOW-UP (OUTPATIENT)
Dept: FAMILY MEDICINE CLINIC | Facility: CLINIC | Age: 70
End: 2025-04-03

## 2025-04-03 NOTE — PROGRESS NOTES
[facility] Clinical Pharmacy Services  Anibal Tran    Administrative Statements   Encounter provider Anibal Tran    The Patient is located at Home and in the following state in which I hold an active license [place].    The patient was identified by name and date of birth. Alma Worley was informed that this is a telemedicine visit and that the visit is being conducted through Telephone.  My office door was closed. No one else was in the room.  She acknowledged consent and understanding of privacy and security of the video platform. The patient has agreed to participate and understands they can discontinue the visit at any time.    Assessment/ Plan     Assessment & Plan  Type 2 diabetes mellitus with hemoglobin A1c goal of less than 7.0% (HCC)    Lab Results   Component Value Date    HGBA1C 8.1 (H) 12/16/2024   Goal A1c <7% .   On Additional Therapies:  Statin: yes  ACEI/ARB: yes    Changes to medication regimen:   Continue Trulicity 0.75 mg weekly, insulin glargine U-300 52 units daily, metformin XR  1000 mg BID           Follow-up: 4 weeks    Subjective   Appointment today was for follow-up of type 2 diabetes management.  Diabetes related complications include retinopathy, CKD, CAD, HF.  She is on a regimen of Trulicity, Toujeo, metformin.  Patient became tearful during our call today over frustration with her current cardiologist.  She did place a call to their office regarding her cholesterol and scheduling what sounds like a cardioversion.  She is upset about their unresponsiveness to getting her scheduled for the cardioversion despite her calling them weeks ago for call back.  I did inform patient that if she is unhappy with her current cardiologist she can switch to a different cardiologist.  I offered to provide her the phone number for scheduling with [facility] cardiology in [place] however she declined stating she could look up the phone number herself and she isn't  sure what she is going to do.  We did not have the opportunity to touch on her diabetes management today        Medication Adherence/ Tolerability/ Cost:  losartan  metFORMIN - 2 tabs BID  pravastatin  spironolactone  José Miguel Hilario Sopn 52 units daily  Trulicity - started last Sunday 0.75 mg weekly     Approved for [employer]    Review of Systems     2. Lifestyle:       3. Home monitoring devices  Glucometer: Yes, Brand:   Continuous Glucose Monitor: No, Brand:     Objective     Blood Glucose Readings  The patient is currently checking blood glucose 1 times per day. Patient reports with SMBG logs.    Date AM Dinner Comments                                           ASCVD Risk:  The ASCVD Risk score (Melita SIERRA, et al., 2019) failed to calculate for the following reasons:    Risk score cannot be calculated because patient has a medical history suggesting prior/existing ASCVD     Vitals:  There were no vitals filed for this visit.    Eye Exam:    Lab Results   Component Value Date    LEFTDIABRET Positive 11/12/2024    RIGHTDIABRET Positive 11/12/2024       Labs:  Lab Results   Component Value Date    SODIUM 136 01/25/2025    K 5.1 01/25/2025    EGFR 76 01/25/2025    CREATININE 0.84 01/25/2025    MICROALBCRE <24 12/16/2024       Lab Results   Component Value Date    HGBA1C 8.1 (H) 12/16/2024    HGBA1C 8.1 (A) 11/21/2024    HGBA1C 7.8 (A) 06/17/2024         Pharmacist Tracking Tool     Pharmacist Tracking Tool  Reason For Outreach: Embedded Pharmacist  Demographics:  Intervention Method: Phone  Type of Intervention: Follow-Up  Topics Addressed: Diabetes  Pharmacologic Interventions: N/A  Non-Pharmacologic Interventions: Care coordination  Time:  Direct Patient Care:  10  mins  Care Coordination:  10  mins  Recommendation Recipient: Patient/Caregiver and Provider  Outcome: Accepted

## 2025-04-03 NOTE — ASSESSMENT & PLAN NOTE
Lab Results   Component Value Date    HGBA1C 8.1 (H) 12/16/2024   Goal A1c <7% .   On Additional Therapies:  Statin: yes  ACEI/ARB: yes    Changes to medication regimen:   Continue Trulicity 0.75 mg weekly, insulin glargine U-300 52 units daily, metformin XR  1000 mg BID

## 2025-04-04 ENCOUNTER — RA CDI HCC (OUTPATIENT)
Dept: OTHER | Facility: HOSPITAL | Age: 70
End: 2025-04-04

## 2025-04-04 ENCOUNTER — RESULTS FOLLOW-UP (OUTPATIENT)
Dept: FAMILY MEDICINE CLINIC | Facility: CLINIC | Age: 70
End: 2025-04-04

## 2025-04-04 ENCOUNTER — TELEMEDICINE (OUTPATIENT)
Dept: FAMILY MEDICINE CLINIC | Facility: CLINIC | Age: 70
End: 2025-04-04

## 2025-04-04 DIAGNOSIS — E11.9 TYPE 2 DIABETES MELLITUS WITH HEMOGLOBIN A1C GOAL OF LESS THAN 7.0% (HCC): Primary | ICD-10-CM

## 2025-04-04 PROCEDURE — PBNCHG PB NO CHARGE PLACEHOLDER: Performed by: PHARMACIST

## 2025-04-04 NOTE — PROGRESS NOTES
HCC coding opportunities    E11.22, E11.65, I13.0, N18.31  GR     Chart Reviewed number of suggestions sent to Provider: 4     Patients Insurance     Medicare Insurance: [employer] Advantage

## 2025-04-07 DIAGNOSIS — F41.1 GENERALIZED ANXIETY DISORDER: ICD-10-CM

## 2025-04-07 NOTE — TELEPHONE ENCOUNTER
Reason for call:   [x] Refill   [] Prior Auth  [] Other:     Office:   [x] PCP/Provider - Gen Patterson PA-C   [] Specialty/Provider -     Medication:     LORazepam (ATIVAN) 0.5 mg tablet       Dose/Frequency:        Take 1 tablet (0.5 mg total) by mouth daily at bedtime as needed for anxiety                      Quantity: 30    Pharmacy: [address] [phone]     Local Pharmacy   Does the patient have enough for 3 days?   [x] Yes   [] No - Send as HP to POD    Mail Away Pharmacy   Does the patient have enough for 10 days?   [] Yes   [] No - Send as HP to POD

## 2025-04-08 ENCOUNTER — OFFICE VISIT (OUTPATIENT)
Dept: FAMILY MEDICINE CLINIC | Facility: CLINIC | Age: 70
End: 2025-04-08
Payer: COMMERCIAL

## 2025-04-08 VITALS
WEIGHT: 211.4 LBS | OXYGEN SATURATION: 96 % | TEMPERATURE: 98 F | SYSTOLIC BLOOD PRESSURE: 105 MMHG | DIASTOLIC BLOOD PRESSURE: 56 MMHG | HEART RATE: 99 BPM | HEIGHT: 63 IN | BODY MASS INDEX: 37.46 KG/M2

## 2025-04-08 DIAGNOSIS — M51.360 DEGENERATION OF INTERVERTEBRAL DISC OF LUMBAR REGION WITH DISCOGENIC BACK PAIN: ICD-10-CM

## 2025-04-08 DIAGNOSIS — E53.8 VITAMIN B12 DEFICIENCY: ICD-10-CM

## 2025-04-08 DIAGNOSIS — I10 HTN, GOAL BELOW 140/90: ICD-10-CM

## 2025-04-08 DIAGNOSIS — E66.01 MORBID OBESITY DUE TO EXCESS CALORIES (HCC): ICD-10-CM

## 2025-04-08 DIAGNOSIS — Z12.11 COLON CANCER SCREENING: ICD-10-CM

## 2025-04-08 DIAGNOSIS — J44.9 CHRONIC OBSTRUCTIVE PULMONARY DISEASE, UNSPECIFIED COPD TYPE (HCC): ICD-10-CM

## 2025-04-08 DIAGNOSIS — E03.9 HYPOTHYROIDISM, UNSPECIFIED TYPE: ICD-10-CM

## 2025-04-08 DIAGNOSIS — I50.33 ACUTE ON CHRONIC HEART FAILURE WITH PRESERVED EJECTION FRACTION (HCC): Primary | ICD-10-CM

## 2025-04-08 DIAGNOSIS — E11.3391 MODERATE NONPROLIFERATIVE DIABETIC RETINOPATHY OF RIGHT EYE WITHOUT MACULAR EDEMA ASSOCIATED WITH TYPE 2 DIABETES MELLITUS (HCC): ICD-10-CM

## 2025-04-08 DIAGNOSIS — Z12.31 ENCOUNTER FOR SCREENING MAMMOGRAM FOR BREAST CANCER: ICD-10-CM

## 2025-04-08 DIAGNOSIS — I50.9 ACUTE ON CHRONIC CONGESTIVE HEART FAILURE, UNSPECIFIED HEART FAILURE TYPE (HCC): ICD-10-CM

## 2025-04-08 DIAGNOSIS — F33.9 DEPRESSION, RECURRENT (HCC): ICD-10-CM

## 2025-04-08 DIAGNOSIS — M54.12 CERVICAL RADICULOPATHY: ICD-10-CM

## 2025-04-08 DIAGNOSIS — E78.2 MIXED HYPERLIPIDEMIA: ICD-10-CM

## 2025-04-08 DIAGNOSIS — Z13.820 SCREENING FOR OSTEOPOROSIS: ICD-10-CM

## 2025-04-08 DIAGNOSIS — E11.9 TYPE 2 DIABETES MELLITUS WITH HEMOGLOBIN A1C GOAL OF LESS THAN 7.0% (HCC): ICD-10-CM

## 2025-04-08 PROBLEM — N18.5 CHRONIC KIDNEY DISEASE, STAGE 5 (HCC): Status: RESOLVED | Noted: 2025-01-08 | Resolved: 2025-04-08

## 2025-04-08 PROCEDURE — G2211 COMPLEX E/M VISIT ADD ON: HCPCS

## 2025-04-08 PROCEDURE — 99214 OFFICE O/P EST MOD 30 MIN: CPT

## 2025-04-08 RX ORDER — LORAZEPAM 0.5 MG/1
0.5 TABLET ORAL
Qty: 30 TABLET | Refills: 0 | Status: SHIPPED | OUTPATIENT
Start: 2025-04-08

## 2025-04-08 RX ORDER — DULAGLUTIDE 1.5 MG/.5ML
1.5 INJECTION, SOLUTION SUBCUTANEOUS WEEKLY
Qty: 6 ML | Refills: 3 | Status: SHIPPED | OUTPATIENT
Start: 2025-04-08

## 2025-04-08 NOTE — ASSESSMENT & PLAN NOTE
Continue follow-up and plan per spine and pain management.  Did not tolerate Cymbalta due to dizziness.

## 2025-04-08 NOTE — ASSESSMENT & PLAN NOTE
Lab Results   Component Value Date    HGBA1C 7.2 (H) 04/03/2025     Improved greatly to 7.2.  Will increase Trulicity to try to get a goal of 7 or less.  Continue follow-up with clinical pharmacy.  Will follow-up in 3 months.  Educated on healthy diet and activity.  Orders:    Hemoglobin A1C; Future    Lipid Panel with Direct LDL reflex; Future    Dulaglutide (Trulicity) 1.5 MG/0.5ML SOAJ; Inject 1.5 mg under the skin once a week

## 2025-04-08 NOTE — ASSESSMENT & PLAN NOTE
Wt Readings from Last 3 Encounters:   04/08/25 95.9 kg (211 lb 6.4 oz)   03/05/25 95.7 kg (211 lb)   01/30/25 95.8 kg (211 lb 3.2 oz)     Appears euvolemic on history and physical today.  Blood pressure at goal today.  Continue follow-up and management per cardiology.  Go to ER with signs/symptoms of decompensation.

## 2025-04-08 NOTE — ASSESSMENT & PLAN NOTE
Continue follow-up with cardiology and cardiology pharmacy with [facility].  Will be discussing further medication options with them.

## 2025-04-08 NOTE — ASSESSMENT & PLAN NOTE
Educated on healthy diet and activity.     Orders:    Lipid Panel with Direct LDL reflex; Future

## 2025-04-08 NOTE — PROGRESS NOTES
Name: Alma Worley      : 1955      MRN: [id number]  Encounter Provider: Gen Patterson PA-C  Encounter Date: 2025   Encounter department: [facility]  :  Assessment & Plan  Acute on chronic heart failure with preserved ejection fraction (HCC)  Wt Readings from Last 3 Encounters:   25 95.9 kg (211 lb 6.4 oz)   25 95.7 kg (211 lb)   25 95.8 kg (211 lb 3.2 oz)       Appears euvolemic on history and physical today.  Blood pressure at goal today.  Continue follow-up and management per cardiology.  Go to ER with signs/symptoms of decompensation.             Acute on chronic congestive heart failure, unspecified heart failure type (HCC)  Wt Readings from Last 3 Encounters:   25 95.9 kg (211 lb 6.4 oz)   25 95.7 kg (211 lb)   25 95.8 kg (211 lb 3.2 oz)     Appears euvolemic on history and physical today.  Blood pressure at goal today.  Continue follow-up and management per cardiology.  Go to ER with signs/symptoms of decompensation.               HTN, goal below 140/90  At goal today.  Continue follow-up and management per cardiology.  Encouraged to take at home blood pressures and contact office if persistently elevated.       Chronic obstructive pulmonary disease, unspecified COPD type (HCC)  Not in acute exacerbation today.  Contact office if exacerbation occurs.       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  Will increase Trulicity to try to get a goal of 7 or less.  Continue follow-up with clinical pharmacy.  Will follow-up in 3 months.  Educated on healthy diet and activity.  Orders:    Hemoglobin A1C; Future    Lipid Panel with Direct LDL reflex; Future    Dulaglutide (Trulicity) 1.5 MG/0.5ML SOAJ; Inject 1.5 mg under the skin once a week    Type 2 diabetes  mellitus with hemoglobin A1c goal of less than 7.0% (HCC)    Lab Results   Component Value Date    HGBA1C 7.2 (H) 04/03/2025   Improved greatly to 7.2.  Will increase Trulicity to try to get a goal of 7 or less.  Continue follow-up with clinical pharmacy.  Will follow-up in 3 months.  Educated on healthy diet and activity.    Orders:    Hemoglobin A1C; Future    Lipid Panel with Direct LDL reflex; Future    Dulaglutide (Trulicity) 1.5 MG/0.5ML SOAJ; Inject 1.5 mg under the skin once a week    Hypothyroidism, unspecified type  Will continue to monitor.  Orders:    TSH, 3rd generation with Free T4 reflex; Future    Depression, recurrent ([facility])  Stable.  Denies SI/HI.  Contact office with worsening.         Morbid obesity due to excess calories ([facility])  Educated on healthy diet and activity.     Orders:    Lipid Panel with Direct LDL reflex; Future    Mixed hyperlipidemia   Continue follow-up with cardiology and cardiology pharmacy with [facility].  Will be discussing further medication options with them.       Cervical radiculopathy  Continue follow-up and plan per spine and pain management.  Did not tolerate Cymbalta due to dizziness.       Degeneration of intervertebral disc of lumbar region with discogenic back pain  Continue follow-up and plan per spine and pain management.  Did not tolerate Cymbalta due to dizziness.       Vitamin B12 deficiency  We will continue to monitor.       Encounter for screening mammogram for breast cancer  Risk/benefits discussed.  Patient declines.  Contact office if you would like to complete.       Screening for osteoporosis  Risk/benefits discussed.  Patient declines.  Contact office if you would like to complete.       Colon cancer screening  Risk/benefits discussed.  Patient declines.  Contact office if you would like to complete.              History of Present Illness   Patient is a 69-year-old female presenting for follow-up.  Following with spine and pain management due to chronic  "back pain and neck pain.  Does have degenerative changes throughout the spine.  Was started on Cymbalta due to hesitancy with epidural injections or opioid management.  Got dizzy with Cymbalta and was unable to tolerate.  Following with cardiology due to hyperlipidemia and A-fib.      Review of Systems   Constitutional:  Negative for appetite change, chills, diaphoresis, fatigue and fever.   HENT:  Negative for congestion, ear discharge, ear pain, postnasal drip, rhinorrhea, sinus pressure, sinus pain, sneezing and sore throat.    Eyes:  Negative for pain, discharge, redness, itching and visual disturbance.   Respiratory:  Negative for apnea, cough, chest tightness, shortness of breath and wheezing.    Cardiovascular:  Negative for chest pain, palpitations and leg swelling.   Gastrointestinal:  Negative for abdominal pain, blood in stool, constipation, diarrhea, nausea and vomiting.   Endocrine: Negative for cold intolerance, heat intolerance, polydipsia and polyuria.   Genitourinary:  Negative for dysuria, flank pain, frequency, hematuria and urgency.   Musculoskeletal:  Positive for arthralgias, back pain, myalgias, neck pain and neck stiffness.   Skin:  Negative for color change and rash.   Allergic/Immunologic: Negative for environmental allergies and food allergies.   Neurological:  Negative for dizziness, tremors, seizures, syncope, speech difficulty, weakness, light-headedness, numbness and headaches.   Hematological:  Negative for adenopathy. Does not bruise/bleed easily.   Psychiatric/Behavioral:  Negative for agitation, confusion, decreased concentration, dysphoric mood, hallucinations, self-injury, sleep disturbance and suicidal ideas. The patient is not nervous/anxious and is not hyperactive.    All other systems reviewed and are negative.      Objective   /56 (BP Location: Left arm, Patient Position: Sitting)   Pulse 99   Temp 98 °F (36.7 °C) (Tympanic)   Ht 5' 2.5\" (1.588 m)   Wt 95.9 kg (211 " lb 6.4 oz)   SpO2 96%   BMI 38.05 kg/m²      Physical Exam  Vitals and nursing note reviewed.   Constitutional:       General: She is not in acute distress.     Appearance: Normal appearance. She is well-developed. She is obese. She is not ill-appearing, toxic-appearing or diaphoretic.   HENT:      Head: Normocephalic and atraumatic.      Right Ear: Tympanic membrane normal.      Left Ear: Tympanic membrane normal.      Nose: Nose normal.      Mouth/Throat:      Mouth: Mucous membranes are moist.      Pharynx: Oropharynx is clear.   Eyes:      Conjunctiva/sclera: Conjunctivae normal.      Pupils: Pupils are equal, round, and reactive to light.   Neck:      Vascular: No carotid bruit.   Cardiovascular:      Rate and Rhythm: Normal rate and regular rhythm.      Pulses: Normal pulses.      Heart sounds: Normal heart sounds. No murmur heard.  Pulmonary:      Effort: Pulmonary effort is normal. No respiratory distress.      Breath sounds: Normal breath sounds. No wheezing.   Chest:      Chest wall: No tenderness.   Abdominal:      General: Bowel sounds are normal.      Palpations: Abdomen is soft. There is no mass.      Tenderness: There is no abdominal tenderness.   Musculoskeletal:         General: Tenderness present. No swelling.      Cervical back: Normal range of motion and neck supple. No tenderness.      Right lower leg: No edema.      Left lower leg: No edema.   Lymphadenopathy:      Cervical: No cervical adenopathy.   Skin:     General: Skin is warm and dry.      Capillary Refill: Capillary refill takes less than 2 seconds.      Findings: No erythema, lesion or rash.   Neurological:      General: No focal deficit present.      Mental Status: She is alert and oriented to person, place, and time. Mental status is at baseline.      Motor: No weakness.      Coordination: Coordination normal.      Gait: Gait normal.   Psychiatric:         Mood and Affect: Mood normal.         Behavior: Behavior normal.          Thought Content: Thought content normal.         Judgment: Judgment normal.

## 2025-04-09 ENCOUNTER — TELEPHONE (OUTPATIENT)
Age: 70
End: 2025-04-09

## 2025-04-09 NOTE — TELEPHONE ENCOUNTER
PA for  Dulaglutide (Trulicity) 1.5 MG/0.5ML SOAJ SUBMITTED to [employer]    via    []CMM-KEY:   []Surescripts-Case ID #   []Availity-Auth ID # NDC #   []Faxed to plan   [x]Other website - [employer] PromptPA  []Phone call Case ID #     [x]PA sent as URGENT    All office notes, labs and other pertaining documents and studies sent. Clinical questions answered. Awaiting determination from insurance company.     Turnaround time for your insurance to make a decision on your Prior Authorization can take 7-21 business days.

## 2025-04-09 NOTE — TELEPHONE ENCOUNTER
PA for Dulaglutide (Trulicity) 1.5 MG/0.5ML SOAJ APPROVED     Date(s) approved  4/9/2025 - 12/31/2025    Case #     Patient advised by          [][employer] Message  []Phone call   [x]LMOM  []L/M to call office as no active Communication consent on file  []Unable to leave detailed message as VM not approved on Communication consent       Pharmacy advised by    [x]Fax  []Phone call  []Secure Chat    Specialty Pharmacy    []      Approval letter scanned into [employer] Yes

## 2025-04-21 ENCOUNTER — TELEPHONE (OUTPATIENT)
Age: 70
End: 2025-04-21

## 2025-04-21 NOTE — TELEPHONE ENCOUNTER
Last visit 04/08/2025  Next appt 07/15/2025    Patient is complaining of having a cold, sore thoat, runny nose and is coughing up phlegm for the past week. Patient stated that she cannot use her CPAP machine due to waking up with pain in her throat. Patient is asking if she can be scheduled tomorrow on 04/22 with Gen Patterson PA-C. Patient stated it does not matter what time. Please contact patient at her home number, [phone]. Thank you.

## 2025-04-22 ENCOUNTER — OFFICE VISIT (OUTPATIENT)
Dept: FAMILY MEDICINE CLINIC | Facility: CLINIC | Age: 70
End: 2025-04-22
Payer: COMMERCIAL

## 2025-04-22 VITALS
BODY MASS INDEX: 37.8 KG/M2 | OXYGEN SATURATION: 98 % | DIASTOLIC BLOOD PRESSURE: 80 MMHG | WEIGHT: 210 LBS | HEART RATE: 115 BPM | SYSTOLIC BLOOD PRESSURE: 134 MMHG | TEMPERATURE: 98.6 F

## 2025-04-22 DIAGNOSIS — J06.9 UPPER RESPIRATORY TRACT INFECTION, UNSPECIFIED TYPE: Primary | ICD-10-CM

## 2025-04-22 DIAGNOSIS — H66.90 ACUTE OTITIS MEDIA, UNSPECIFIED OTITIS MEDIA TYPE: ICD-10-CM

## 2025-04-22 LAB
S PYO AG THROAT QL: NEGATIVE
SARS-COV-2 AG UPPER RESP QL IA: NEGATIVE
SL AMB POCT RAPID FLU A: NEGATIVE
SL AMB POCT RAPID FLU B: NEGATIVE
VALID CONTROL: NORMAL

## 2025-04-22 PROCEDURE — 87811 SARS-COV-2 COVID19 W/OPTIC: CPT

## 2025-04-22 PROCEDURE — 87880 STREP A ASSAY W/OPTIC: CPT

## 2025-04-22 PROCEDURE — 99213 OFFICE O/P EST LOW 20 MIN: CPT

## 2025-04-22 PROCEDURE — 87804 INFLUENZA ASSAY W/OPTIC: CPT

## 2025-04-22 PROCEDURE — G2211 COMPLEX E/M VISIT ADD ON: HCPCS

## 2025-04-22 RX ORDER — BENZONATATE 200 MG/1
200 CAPSULE ORAL 3 TIMES DAILY PRN
Qty: 21 CAPSULE | Refills: 0 | Status: SHIPPED | OUTPATIENT
Start: 2025-04-22

## 2025-04-22 NOTE — PROGRESS NOTES
Name: Alma Worley      : 1955      MRN: [id number]  Encounter Provider: Gen Patterson PA-C  Encounter Date: 2025   Encounter department: [facility]  :  Assessment & Plan  Upper respiratory tract infection, unspecified type  COVID strep and flu negative.  Will prescribe Augmentin and benzonatate.  Recommend treatment with over-the-counter and home remedies.  Discussed recommendation for nasal congestion.  For cough and chest congestion using Mucinex twice a day or honey as a natural cough suppressant.  For headaches, sinus pressure/pain can use Tylenol or ibuprofen.  For runny nose can use Flonase nasal spray, antihistamine nasal spray as well as Zyrtec/Allegra/Claritin.  Recommend hydration and adequate nutrition.  Discussed the anticipated course of viral upper respiratory infection.  Patient to follow-up if symptoms worsen or do not improve as discussed.    Orders:    POCT Rapid Covid Ag    POCT rapid ANTIGEN strepA    POCT rapid flu A and B    benzonatate (TESSALON) 200 MG capsule; Take 1 capsule (200 mg total) by mouth 3 (three) times a day as needed for cough    Acute otitis media, unspecified otitis media type  Bilateral erythematous tympanic membranes with bulging.  Will prescribe Augmentin twice a day for 10 days.  Educated on side effects of medication.  Contact office if no improvement or worsening.  Orders:    amoxicillin-clavulanate (AUGMENTIN) 875-125 mg per tablet; Take 1 tablet by mouth every 12 (twelve) hours for 10 days           History of Present Illness   Patient is a 69-year-old female presenting with upper respiratory infection for 1 week.    URI   This is a new problem. The current episode started 1 to 4 weeks ago. The problem has been unchanged. There has been no fever. Associated symptoms include congestion, coughing, ear pain, a plugged ear sensation, rhinorrhea, sinus pain and a sore throat. Pertinent negatives include no abdominal pain, chest  pain, diarrhea, dysuria, headaches, joint pain, joint swelling, nausea, neck pain, rash, sneezing, swollen glands, vomiting or wheezing. Treatments tried: Mucinex. The treatment provided no relief.     Review of Systems   Constitutional:  Negative for appetite change, chills, diaphoresis, fatigue and fever.   HENT:  Positive for congestion, ear pain, postnasal drip, rhinorrhea, sinus pressure, sinus pain and sore throat. Negative for ear discharge and sneezing.    Eyes:  Negative for pain, discharge, redness, itching and visual disturbance.   Respiratory:  Positive for cough. Negative for apnea, chest tightness, shortness of breath and wheezing.    Cardiovascular:  Negative for chest pain, palpitations and leg swelling.   Gastrointestinal:  Negative for abdominal pain, blood in stool, constipation, diarrhea, nausea and vomiting.   Endocrine: Negative for cold intolerance, heat intolerance, polydipsia and polyuria.   Genitourinary:  Negative for dysuria, flank pain, frequency, hematuria and urgency.   Musculoskeletal:  Negative for arthralgias, back pain, joint pain, myalgias, neck pain and neck stiffness.   Skin:  Negative for color change and rash.   Allergic/Immunologic: Negative for environmental allergies and food allergies.   Neurological:  Negative for dizziness, tremors, seizures, syncope, speech difficulty, weakness, light-headedness, numbness and headaches.   Hematological:  Negative for adenopathy. Does not bruise/bleed easily.   Psychiatric/Behavioral:  Negative for agitation, confusion, decreased concentration, dysphoric mood, hallucinations, self-injury, sleep disturbance and suicidal ideas. The patient is not nervous/anxious and is not hyperactive.    All other systems reviewed and are negative.      Objective   /80 (BP Location: Left arm, Patient Position: Sitting)   Pulse (!) 115   Temp 98.6 °F (37 °C) (Tympanic)   Wt 95.3 kg (210 lb)   SpO2 98%   BMI 37.80 kg/m²      Physical Exam  Vitals  and nursing note reviewed.   Constitutional:       General: She is not in acute distress.     Appearance: Normal appearance. She is well-developed and normal weight. She is not ill-appearing, toxic-appearing or diaphoretic.   HENT:      Head: Normocephalic and atraumatic.      Right Ear: Ear canal and external ear normal.      Left Ear: Ear canal and external ear normal.      Ears:      Comments: Bilateral erythematous TMs     Nose: Congestion and rhinorrhea present.      Mouth/Throat:      Mouth: Mucous membranes are moist.      Pharynx: Oropharynx is clear.   Eyes:      Conjunctiva/sclera: Conjunctivae normal.      Pupils: Pupils are equal, round, and reactive to light.   Neck:      Vascular: No carotid bruit.   Cardiovascular:      Rate and Rhythm: Normal rate and regular rhythm.      Pulses: Normal pulses.      Heart sounds: Normal heart sounds. No murmur heard.  Pulmonary:      Effort: Pulmonary effort is normal. No respiratory distress.      Breath sounds: Normal breath sounds. No stridor. No wheezing, rhonchi or rales.   Chest:      Chest wall: No tenderness.   Abdominal:      General: Bowel sounds are normal.      Palpations: Abdomen is soft. There is no mass.      Tenderness: There is no abdominal tenderness.   Musculoskeletal:         General: No swelling or tenderness.      Cervical back: Normal range of motion and neck supple. No tenderness.      Right lower leg: No edema.      Left lower leg: No edema.   Lymphadenopathy:      Cervical: No cervical adenopathy.   Skin:     General: Skin is warm and dry.      Capillary Refill: Capillary refill takes less than 2 seconds.      Findings: No erythema, lesion or rash.   Neurological:      General: No focal deficit present.      Mental Status: She is alert and oriented to person, place, and time. Mental status is at baseline.      Motor: No weakness.      Coordination: Coordination normal.      Gait: Gait normal.   Psychiatric:         Mood and Affect: Mood  normal.         Behavior: Behavior normal.         Thought Content: Thought content normal.         Judgment: Judgment normal.

## 2025-05-01 NOTE — ASSESSMENT & PLAN NOTE
Lab Results   Component Value Date    HGBA1C 7.2 (H) 04/03/2025     Goal A1c <7% .   On Additional Therapies:  Statin: yes  ACEI/ARB: yes    Assessment:  A1c has improved to 7.2%.  He is tolerating Trulicity 1.5 mg weekly.  Fasting readings show excellent control with average of 102 mg/dL (goal less than 130 mg/dL).    Changes to medication regimen:  Trulicity: Continue 1.5 mg weekly  Insulin glargine U-300: Continue 52 units daily  Metformin XR: Continue 1000 mg BID

## 2025-05-01 NOTE — PROGRESS NOTES
[facility] Clinical Pharmacy Services  Anibal Tran    Administrative Statements   Encounter provider Anibal Tran    The Patient is located at Home and in the following state in which I hold an active license [place].    The patient was identified by name and date of birth. Alma Worley was informed that this is a telemedicine visit and that the visit is being conducted through Telephone.  My office door was closed. No one else was in the room.  She acknowledged consent and understanding of privacy and security of the video platform. The patient has agreed to participate and understands they can discontinue the visit at any time.    Assessment/ Plan     Assessment & Plan  Type 2 diabetes mellitus with hemoglobin A1c goal of less than 7.0% (HCC)    Lab Results   Component Value Date    HGBA1C 7.2 (H) 04/03/2025     Goal A1c <7% .   On Additional Therapies:  Statin: yes  ACEI/ARB: yes    Assessment:  A1c has improved to 7.2%.  He is tolerating Trulicity 1.5 mg weekly.  Fasting readings show excellent control with average of 102 mg/dL (goal less than 130 mg/dL).    Changes to medication regimen:  Trulicity: Continue 1.5 mg weekly  Insulin glargine U-300: Continue 52 units daily  Metformin XR: Continue 1000 mg BID           Follow-up: 6 weeks    Subjective   Appointment today was for follow-up of type 2 diabetes management.  Diabetes related complications include retinopathy, CKD, CAD, HF.  She is on a regimen of Trulicity, Toujeo, metformin.  Her  Trulicity was increased last appointment by her PCP to 1.5 mg weekly      Medication Adherence/ Tolerability/ Cost:  losartan  metFORMIN - 2 tabs BID  pravastatin  spironolactone  Toujeo SoloStar Sopn 52 units daily  Trulicity 1.5 mg weekly-increased 2 weeks ago. Decreased appetite but tolerating without side effects     Approved for PAC[facility]ET    Review of Systems   Gastrointestinal:  Negative for abdominal pain, constipation, diarrhea, nausea and  vomiting.        2. Lifestyle:       3. Home monitoring devices  Glucometer: Yes, Brand:   Continuous Glucose Monitor: No, Brand:     Objective     Blood Glucose Readings  The patient is currently checking blood glucose 1 times per day. Patient reports with SMBG logs.    Date AM Comments                   86     95     114     117     93     81     121     99    5/2 114    Avg 102          ASCVD Risk:  The ASCVD Risk score (Melita SIERRA, et al., 2019) failed to calculate for the following reasons:    Risk score cannot be calculated because patient has a medical history suggesting prior/existing ASCVD     Vitals:  There were no vitals filed for this visit.    Eye Exam:    Lab Results   Component Value Date    LEFTDIABRET Positive 11/12/2024    RIGHTDIABRET Positive 11/12/2024       Labs:  Lab Results   Component Value Date    SODIUM 138 04/03/2025    K 4.3 04/03/2025    EGFR 54 (L) 04/03/2025    CREATININE 1.1 (H) 04/03/2025    MICROALBCRE <24 12/16/2024       Lab Results   Component Value Date    HGBA1C 7.2 (H) 04/03/2025    HGBA1C 8.1 (H) 12/16/2024    HGBA1C 8.1 (A) 11/21/2024         Pharmacist Tracking Tool     Pharmacist Tracking Tool  Reason For Outreach: Embedded Pharmacist  Demographics:  Intervention Method: Phone  Type of Intervention: Follow-Up  Topics Addressed: Diabetes  Pharmacologic Interventions: N/A  Non-Pharmacologic Interventions: Disease state education, Home Monitoring, and Medication/Device education  Time:  Direct Patient Care:  10  mins  Care Coordination:  10  mins  Recommendation Recipient: Patient/Caregiver and Provider  Outcome: Accepted

## 2025-05-02 ENCOUNTER — TELEMEDICINE (OUTPATIENT)
Dept: FAMILY MEDICINE CLINIC | Facility: CLINIC | Age: 70
End: 2025-05-02

## 2025-05-02 DIAGNOSIS — E11.9 TYPE 2 DIABETES MELLITUS WITH HEMOGLOBIN A1C GOAL OF LESS THAN 7.0% (HCC): Primary | ICD-10-CM

## 2025-05-02 DIAGNOSIS — I50.9 ACUTE ON CHRONIC CONGESTIVE HEART FAILURE, UNSPECIFIED HEART FAILURE TYPE (HCC): ICD-10-CM

## 2025-05-02 PROCEDURE — PBNCHG PB NO CHARGE PLACEHOLDER: Performed by: PHARMACIST

## 2025-05-02 NOTE — TELEPHONE ENCOUNTER
Reason for call:   [x] Refill   [] Prior Auth  [] Other:     Office:   [x] PCP/Provider - [facility]   [] Specialty/Provider -     Medication:   ~ apixaban (ELIQUIS) 5 mg - Take 1 tablet (5 mg total) by mouth 2 (two) times a day     Pharmacy:   [facility] [address] [facility] [address] [facility] [place]   Does the patient have enough for 3 days?   [x] Yes   [] No - Send as HP to POD

## 2025-05-06 ENCOUNTER — VBI (OUTPATIENT)
Dept: ADMINISTRATIVE | Facility: OTHER | Age: 70
End: 2025-05-06

## 2025-05-06 DIAGNOSIS — F41.1 GENERALIZED ANXIETY DISORDER: ICD-10-CM

## 2025-05-06 RX ORDER — LORAZEPAM 0.5 MG/1
0.5 TABLET ORAL
Qty: 30 TABLET | Refills: 0 | Status: SHIPPED | OUTPATIENT
Start: 2025-05-06

## 2025-05-06 NOTE — TELEPHONE ENCOUNTER
Reason for call:   [x] Refill   [] Prior Auth  [] Other:     Office:   [x] PCP/Provider -   [] Specialty/Provider -     Medication: LORazepam (ATIVAN) 0.5 mg tablet     Dose/Frequency:  Take 1 tablet (0.5 mg total) by mouth daily at bedtime as needed     Quantity: 30 tablet    Pharmacy: [place] [id number] - [place], [place] -     Local Pharmacy   Does the patient have enough for 3 days?   [x] Yes   [] No - Send as HP to POD    Mail Away Pharmacy   Does the patient have enough for 10 days?   [] Yes   [] No - Send as HP to POD

## 2025-05-06 NOTE — TELEPHONE ENCOUNTER
05/06/25 7:44 AM     Chart reviewed for Diabetic Eye Exam was/were not submitted to the patient's insurance.     Sophie Sanches MA   [facility]

## 2025-06-04 DIAGNOSIS — I25.10 CORONARY ARTERY DISEASE INVOLVING NATIVE HEART WITHOUT ANGINA PECTORIS, UNSPECIFIED VESSEL OR LESION TYPE: ICD-10-CM

## 2025-06-04 DIAGNOSIS — I10 HTN, GOAL BELOW 140/90: ICD-10-CM

## 2025-06-04 DIAGNOSIS — F41.1 GENERALIZED ANXIETY DISORDER: ICD-10-CM

## 2025-06-04 RX ORDER — CARVEDILOL 25 MG/1
25 TABLET ORAL 2 TIMES DAILY WITH MEALS
Qty: 180 TABLET | Refills: 0 | Status: SHIPPED | OUTPATIENT
Start: 2025-06-04

## 2025-06-04 RX ORDER — EZETIMIBE 10 MG
10 TABLET ORAL DAILY
Qty: 90 TABLET | Refills: 1 | Status: SHIPPED | OUTPATIENT
Start: 2025-06-04 | End: 2025-06-06

## 2025-06-04 RX ORDER — LORAZEPAM 0.5 MG/1
0.5 TABLET ORAL
Qty: 30 TABLET | Refills: 0 | Status: SHIPPED | OUTPATIENT
Start: 2025-06-04

## 2025-06-04 RX ORDER — ISOSORBIDE MONONITRATE 30 MG/1
30 TABLET, EXTENDED RELEASE ORAL DAILY
Qty: 90 TABLET | Refills: 0 | Status: SHIPPED | OUTPATIENT
Start: 2025-06-04

## 2025-06-04 NOTE — TELEPHONE ENCOUNTER
Reason for call:   [x] Refill   [] Prior Auth  [] Other:     Office:   [x] PCP/Provider -   [] Specialty/Provider -     Medication:   carvedilol (COREG) 25 mg       Dose/Frequency: : Take 1 tablet (25 mg total) by mouth 2 (two) times a day with meals     Quantity: 180    Pharmacy: [address]        Medication:   LORazepam (ATIVAN) 0.5 mg        Dose/Frequency:  Take 1 tablet (0.5 mg total) by mouth daily at bedtime as needed for anxiety     Quantity: 30    Pharmacy: [address]          Medication:   isosorbide mononitrate (IMDUR) 30 mg       Dose/Frequency: : Take 1 tablet (30 mg total) by mouth daily     Quantity: 90    Pharmacy: [address]          Medication:   Zetia 10 MG        Dose/Frequency:  Take 10 mg by mouth daily     Quantity: 90    Pharmacy: [address] [facility]   Does the patient have enough for 3 days?   [x] Yes   [] No - Send as HP to POD    Mail Away Pharmacy   Does the patient have enough for 10 days?   [] Yes   [] No - Send as HP to POD

## 2025-06-05 DIAGNOSIS — E11.9 TYPE 2 DIABETES MELLITUS WITH HEMOGLOBIN A1C GOAL OF LESS THAN 7.0% (HCC): ICD-10-CM

## 2025-06-05 DIAGNOSIS — I50.9 ACUTE ON CHRONIC CONGESTIVE HEART FAILURE, UNSPECIFIED HEART FAILURE TYPE (HCC): ICD-10-CM

## 2025-06-05 NOTE — TELEPHONE ENCOUNTER
Reason for call:   [x] Refill   [] Prior Auth  [] Other:     Office:   [x] PCP/Provider - Gen Patterson  [] Specialty/Provider -     Medication: Insulin Pen Needle 32G X 4 MM     Dose/Frequency: use daily    Quantity: 100    Pharmacy: [facility]   Does the patient have enough for 3 days?   [x] Yes   [] No - Send as HP to POD    Mail Away Pharmacy   Does the patient have enough for 10 days?   [] Yes   [] No - Send as HP to POD

## 2025-06-06 RX ORDER — EZETIMIBE 10 MG/1
10 TABLET ORAL DAILY
Qty: 90 TABLET | Refills: 1 | Status: SHIPPED | OUTPATIENT
Start: 2025-06-06

## 2025-06-06 RX ORDER — SPIRONOLACTONE 25 MG/1
25 TABLET ORAL DAILY
Qty: 90 TABLET | Refills: 3 | Status: SHIPPED | OUTPATIENT
Start: 2025-06-06

## 2025-06-06 NOTE — TELEPHONE ENCOUNTER
Patient called requesting refill for OneTouch Verio Test Strips. Patient made aware medication was refilled on 3/10/25 for 100 with 5 refills to [facility] pharmacy. Patient instructed to contact the pharmacy and speak with someone directly to obtain refills of medication. Patient advised to call back for refill if their pharmacy is unable to assist them. Patient verbalized understanding.     
not applicable

## 2025-06-18 ENCOUNTER — RESULTS FOLLOW-UP (OUTPATIENT)
Dept: FAMILY MEDICINE CLINIC | Facility: CLINIC | Age: 70
End: 2025-06-18

## 2025-06-20 ENCOUNTER — TELEMEDICINE (OUTPATIENT)
Dept: FAMILY MEDICINE CLINIC | Facility: CLINIC | Age: 70
End: 2025-06-20

## 2025-06-20 DIAGNOSIS — E11.9 TYPE 2 DIABETES MELLITUS WITH HEMOGLOBIN A1C GOAL OF LESS THAN 7.0% (HCC): ICD-10-CM

## 2025-06-20 DIAGNOSIS — E11.9 TYPE 2 DIABETES MELLITUS WITH HEMOGLOBIN A1C GOAL OF LESS THAN 7.0% (HCC): Primary | ICD-10-CM

## 2025-06-20 PROCEDURE — PBNCHG PB NO CHARGE PLACEHOLDER: Performed by: PHARMACIST

## 2025-06-20 RX ORDER — INSULIN GLARGINE 300 [IU]/ML
52 INJECTION, SOLUTION SUBCUTANEOUS DAILY
Qty: 18 ML | Refills: 1 | Status: SHIPPED | OUTPATIENT
Start: 2025-06-20 | End: 2025-06-23

## 2025-06-20 RX ORDER — METFORMIN HYDROCHLORIDE 500 MG/1
1000 TABLET, EXTENDED RELEASE ORAL
Qty: 200 TABLET | Refills: 3 | Status: SHIPPED | OUTPATIENT
Start: 2025-06-20 | End: 2025-06-25 | Stop reason: SDUPTHER

## 2025-06-20 NOTE — ASSESSMENT & PLAN NOTE
Lab Results   Component Value Date    HGBA1C 7.4 (H) 06/17/2025   Goal A1c <7% .   On Additional Therapies:  Statin: yes  ACEI/ARB: yes    Assessment:  A1c 7.4%.  She is tolerating Trulicity 1.5 mg weekly.  Fasting readings all < 130 mg/dL.    Most recent GFR 50. Okay to continue metformin. Monitor kidney function every 3 to 6 mo. She does experience diarrhea on metformin and ends up skipping doses when she plans to leave the house    Changes to medication regimen:  Trulicity: Continue 1.5 mg weekly  Insulin glargine U-300: Continue 52 units daily  Metformin XR: Decrease to 1000 mg once daily due to diarrhea . You can take 2 tabs together or split doing twice daily    Orders:    metFORMIN (GLUCOPHAGE-XR) 500 mg 24 hr tablet; Take 2 tablets (1,000 mg total) by mouth daily with dinner

## 2025-06-20 NOTE — PROGRESS NOTES
[facility] Clinical [facility]acy Services  Anibal Tran    Administrative Statements   Encounter provider Anibal Tran    The Patient is located at Home and in the following state in which I hold an active license [place].    The patient was identified by name and date of birth. Alma Worley was informed that this is a telemedicine visit and that the visit is being conducted through Telephone.  My office door was closed. No one else was in the room.  She acknowledged consent and understanding of privacy and security of the video platform. The patient has agreed to participate and understands they can discontinue the visit at any time.    Assessment/ Plan     Assessment & Plan  Type 2 diabetes mellitus with hemoglobin A1c goal of less than 7.0% ([facility])    Lab Results   Component Value Date    HGBA1C 7.4 (H) 06/17/2025   Goal A1c <7% .   On Additional Therapies:  Statin: yes  ACEI/ARB: yes    Assessment:  A1c 7.4%.  She is tolerating Trulicity 1.5 mg weekly.  Fasting readings all < 130 mg/dL.    Most recent GFR 50. Okay to continue metformin. Monitor kidney function every 3 to 6 mo. She does experience diarrhea on metformin and ends up skipping doses when she plans to leave the house    Changes to medication regimen:  Trulicity: Continue 1.5 mg weekly  Insulin glargine U-300: Continue 52 units daily  Metformin XR: Decrease to 1000 mg once daily due to diarrhea . You can take 2 tabs together or split doing twice daily    Orders:    metFORMIN (GLUCOPHAGE-XR) 500 mg 24 hr tablet; Take 2 tablets (1,000 mg total) by mouth daily with dinner      Follow-up: 8 weeks    Subjective   Appointment today was for follow-up of type 2 diabetes management.  Diabetes related complications include retinopathy, CKD, CAD, HF.  She is on a regimen of Trulicity, Toujeo, metformin.  Patient was hospitalized from 6/13/25 to 6/15/25 for acute hypoxic/hypercapnic respiratory failure. She has been watching fluid and sodium  restriction. Does not have scale at home. Plans to buy one this weekend. Her breathing has improved.       Medication Adherence/ Tolerability/ Cost:  losartan  metFORMIN - 2 tabs BID. Takes   pravastatin  spironolactone  Toudarcy AlmagueroStar Sopn 52 units daily  Trulicity 1.5 mg weekly     Previous medications:  Jardiance- yeast infections    Approved for PACENET    Review of Systems   Gastrointestinal:  Negative for abdominal pain, constipation, diarrhea, nausea and vomiting.        2. Lifestyle:       3. Home monitoring devices  Glucometer: Yes, Brand:   Continuous Glucose Monitor: No, Patient declines CGM    Objective     Blood Glucose Readings  The patient is currently checking blood glucose 1 times per day. Patient reports with SMBG logs.    Date AM Comments   6/15 233 Came home from hospital and wasn't on home meds yet   6/16 126    6/17 115    6/18 108    6/19 126    6/20 115    Avg           ASCVD Risk:  The ASCVD Risk score (Melita SIERRA, et al., 2019) failed to calculate for the following reasons:    Risk score cannot be calculated because patient has a medical history suggesting prior/existing ASCVD     Vitals:  There were no vitals filed for this visit.    Eye Exam:    Lab Results   Component Value Date    LEFTDIABRET Positive 11/12/2024    RIGHTDIABRET Positive 11/12/2024       Labs:  Lab Results   Component Value Date    SODIUM 138 06/17/2025    K 4.9 06/17/2025    EGFR 50 (L) 06/17/2025    CREATININE 1.2 (H) 06/17/2025    MICROALBCRE <24 12/16/2024       Lab Results   Component Value Date    HGBA1C 7.4 (H) 06/17/2025    HGBA1C 7.2 (H) 04/03/2025    HGBA1C 8.1 (H) 12/16/2024         Pharmacist Tracking Tool     Pharmacist Tracking Tool  Reason For Outreach: Embedded Pharmacist  Demographics:  Intervention Method: Phone  Type of Intervention: Follow-Up  Topics Addressed: Diabetes  Pharmacologic Interventions: Dose or Frequency Adjusted and Prevent or Manage JANAE  Non-Pharmacologic Interventions: Disease  state education, Home Monitoring, and Medication/Device education  Time:  Direct Patient Care: 10 mins  Care Coordination: 10 mins  Recommendation Recipient: Patient/Caregiver and Provider  Outcome: Accepted

## 2025-06-20 NOTE — TELEPHONE ENCOUNTER
Pt called refill line stating her insurance needs script to say to DISPENSE GENERIC ONLY as pharmacy keeps trying to fill brand. Please determine if appropriate    Reason for call:   [x] Refill   [] Prior Auth  [] Other:     Office:   [x] PCP/Provider - [facility]  [] Specialty/Provider -     Medication:   ~ Insulin Glargine Max SoloStar 300 UNIT/ML SOPN - Inject 52 Units under the skin in the morning     Pharmacy:   [facility] [id number] [facility] [address] [facility]   Does the patient have enough for 3 days?   [] Yes   [x] No - Send as HP to POD

## 2025-06-23 RX ORDER — INSULIN GLARGINE 300 [IU]/ML
INJECTION, SOLUTION SUBCUTANEOUS
Qty: 18 ML | Refills: 1 | Status: SHIPPED | OUTPATIENT
Start: 2025-06-23 | End: 2025-06-23 | Stop reason: SDUPTHER

## 2025-06-23 RX ORDER — INSULIN GLARGINE 300 [IU]/ML
INJECTION, SOLUTION SUBCUTANEOUS
Qty: 18 ML | Refills: 1 | Status: SHIPPED | OUTPATIENT
Start: 2025-06-23 | End: 2025-06-25 | Stop reason: SDUPTHER

## 2025-06-24 DIAGNOSIS — I10 HTN, GOAL BELOW 140/90: ICD-10-CM

## 2025-06-24 DIAGNOSIS — E11.9 TYPE 2 DIABETES MELLITUS WITH HEMOGLOBIN A1C GOAL OF LESS THAN 7.0% (HCC): ICD-10-CM

## 2025-06-24 DIAGNOSIS — I50.9 ACUTE ON CHRONIC CONGESTIVE HEART FAILURE, UNSPECIFIED HEART FAILURE TYPE (HCC): ICD-10-CM

## 2025-06-24 RX ORDER — INSULIN GLARGINE 300 [IU]/ML
INJECTION, SOLUTION SUBCUTANEOUS
Qty: 18 ML | Refills: 1 | OUTPATIENT
Start: 2025-06-24

## 2025-06-24 RX ORDER — LOSARTAN POTASSIUM 100 MG/1
100 TABLET ORAL DAILY
Qty: 90 TABLET | Refills: 1 | OUTPATIENT
Start: 2025-06-24

## 2025-06-25 DIAGNOSIS — J44.9 CHRONIC OBSTRUCTIVE PULMONARY DISEASE, UNSPECIFIED COPD TYPE (HCC): ICD-10-CM

## 2025-06-25 DIAGNOSIS — I10 HTN, GOAL BELOW 140/90: ICD-10-CM

## 2025-06-25 DIAGNOSIS — E11.9 TYPE 2 DIABETES MELLITUS WITH HEMOGLOBIN A1C GOAL OF LESS THAN 7.0% (HCC): ICD-10-CM

## 2025-06-25 DIAGNOSIS — I50.9 ACUTE ON CHRONIC CONGESTIVE HEART FAILURE, UNSPECIFIED HEART FAILURE TYPE (HCC): ICD-10-CM

## 2025-06-25 DIAGNOSIS — I25.10 CORONARY ARTERY DISEASE INVOLVING NATIVE HEART WITHOUT ANGINA PECTORIS, UNSPECIFIED VESSEL OR LESION TYPE: Primary | ICD-10-CM

## 2025-06-25 DIAGNOSIS — E03.9 HYPOTHYROIDISM, UNSPECIFIED TYPE: ICD-10-CM

## 2025-06-25 RX ORDER — BLOOD SUGAR DIAGNOSTIC
STRIP MISCELLANEOUS
Qty: 100 EACH | Refills: 5 | Status: SHIPPED | OUTPATIENT
Start: 2025-06-25

## 2025-06-25 RX ORDER — METFORMIN HYDROCHLORIDE 500 MG/1
1000 TABLET, EXTENDED RELEASE ORAL
Qty: 200 TABLET | Refills: 3 | Status: SHIPPED | OUTPATIENT
Start: 2025-06-25

## 2025-06-25 RX ORDER — ALBUTEROL SULFATE 90 UG/1
2 INHALANT RESPIRATORY (INHALATION) EVERY 6 HOURS PRN
Qty: 18 G | Refills: 5 | Status: SHIPPED | OUTPATIENT
Start: 2025-06-25

## 2025-06-25 RX ORDER — LEVOTHYROXINE SODIUM 25 UG/1
25 TABLET ORAL DAILY
Qty: 90 TABLET | Refills: 3 | Status: SHIPPED | OUTPATIENT
Start: 2025-06-25

## 2025-06-25 RX ORDER — LOSARTAN POTASSIUM 100 MG/1
100 TABLET ORAL DAILY
Qty: 90 TABLET | Refills: 1 | Status: SHIPPED | OUTPATIENT
Start: 2025-06-25 | End: 2025-06-26

## 2025-06-25 RX ORDER — PEN NEEDLE, DIABETIC 31 GX5/16"
4 NEEDLE, DISPOSABLE MISCELLANEOUS 4 TIMES DAILY
COMMUNITY

## 2025-06-25 RX ORDER — NITROGLYCERIN 0.4 MG/1
0.4 TABLET SUBLINGUAL
Qty: 30 TABLET | Refills: 5 | Status: SHIPPED | OUTPATIENT
Start: 2025-06-25

## 2025-06-25 RX ORDER — INSULIN GLARGINE 300 [IU]/ML
INJECTION, SOLUTION SUBCUTANEOUS
Qty: 18 ML | Refills: 1 | Status: SHIPPED | OUTPATIENT
Start: 2025-06-25

## 2025-06-25 NOTE — TELEPHONE ENCOUNTER
Reason for call:   [x] Refill   [] Prior Auth  [] Other:     Office:   [x] PCP/Provider -   [] Specialty/Provider -     Medication:   Insulin Glargine Solostar 300 UNIT       Dose/Frequency:  INJECT 52 UNITS UNDER THE SKIN  IN THE MORNING Strength: 300 UNIT/ML     Quantity: 18    Pharmacy: [facility] [address]     Medication:   losartan (COZAAR) 100 MG       Dose/Frequency: Take 1 tablet (100 mg total) by mouth daily     Quantity: 90    [facility] [address]     Local Pharmacy   Does the patient have enough for 3 days?   [] Yes   [] No - Send as HP to POD    Mail Away Pharmacy   Does the patient have enough for 10 days?   [] Yes   [x] No - Send as HP to POD

## 2025-06-26 ENCOUNTER — OFFICE VISIT (OUTPATIENT)
Dept: FAMILY MEDICINE CLINIC | Facility: CLINIC | Age: 70
End: 2025-06-26
Payer: COMMERCIAL

## 2025-06-26 VITALS
HEIGHT: 63 IN | BODY MASS INDEX: 37.74 KG/M2 | OXYGEN SATURATION: 96 % | DIASTOLIC BLOOD PRESSURE: 57 MMHG | TEMPERATURE: 97.8 F | SYSTOLIC BLOOD PRESSURE: 102 MMHG | HEART RATE: 59 BPM | WEIGHT: 213 LBS

## 2025-06-26 DIAGNOSIS — J96.02 ACUTE RESPIRATORY FAILURE WITH HYPOXIA AND HYPERCAPNIA (HCC): ICD-10-CM

## 2025-06-26 DIAGNOSIS — I50.33 ACUTE ON CHRONIC HEART FAILURE WITH PRESERVED EJECTION FRACTION (HCC): Primary | ICD-10-CM

## 2025-06-26 DIAGNOSIS — E11.9 TYPE 2 DIABETES MELLITUS WITH HEMOGLOBIN A1C GOAL OF LESS THAN 7.0% (HCC): ICD-10-CM

## 2025-06-26 DIAGNOSIS — J96.01 ACUTE RESPIRATORY FAILURE WITH HYPOXIA AND HYPERCAPNIA (HCC): ICD-10-CM

## 2025-06-26 PROCEDURE — 99214 OFFICE O/P EST MOD 30 MIN: CPT

## 2025-06-26 PROCEDURE — G2211 COMPLEX E/M VISIT ADD ON: HCPCS

## 2025-06-26 RX ORDER — SOTALOL HYDROCHLORIDE 120 MG/1
120 TABLET ORAL 2 TIMES DAILY
COMMUNITY
Start: 2025-06-11

## 2025-06-26 RX ORDER — LOSARTAN POTASSIUM 25 MG/1
25 TABLET ORAL DAILY
Start: 2025-06-26

## 2025-06-26 RX ORDER — EVOLOCUMAB 140 MG/ML
140 INJECTION, SOLUTION SUBCUTANEOUS
COMMUNITY
Start: 2025-05-13

## 2025-06-26 NOTE — PROGRESS NOTES
Name: Alma Worley      : 1955      MRN: [id number]  Encounter Provider: Gen Patterson PA-C  Encounter Date: 2025   Encounter department: [facility]  :  Assessment & Plan  Acute on chronic heart failure with preserved ejection fraction (HCC)  Wt Readings from Last 3 Encounters:   25 96.6 kg (213 lb)   25 95.3 kg (210 lb)   25 95.9 kg (211 lb 6.4 oz)       Appears euvolemic on history and physical today.  Continue current management.  Continue follow-up and plan per cardiology.  Will repeat basic metabolic panel at this point to monitor electrolytes and kidney function.  Educated on ER precautions.        Orders:    Basic metabolic panel; Future    losartan (COZAAR) 25 mg tablet; Take 1 tablet (25 mg total) by mouth daily    Acute respiratory failure with hypoxia and hypercapnia (HCC)  Symptoms resolved.  Educated on ER precautions.       Type 2 diabetes mellitus with hemoglobin A1c goal of less than 7.0% ([facility])  Blood glucose elevated while inpatient.  Will have patient take daily blood glucose and if persistently elevated, will increase Trulicity.  Educated patient on goal range between .  Educated on healthy diet and activity.  Will follow-up on 7/15.  Lab Results   Component Value Date    HGBA1C 7.4 (H) 2025                   History of Present Illness   Patient is a 69-year-old female presenting for hospital follow-up of acute on chronic heart failure with preserved ejection fraction.  Patient overall has significantly improved symptoms.  Denies SOB, VALERIO, orthopnea, chest pain, leg swelling, dizziness, syncope.  Has been feeling significantly better than baseline due to having cardioversion about 2 days prior to episode.    Hospital course:  Patient was admitted with a complaint of shortness of breath. With a concern of pulmonary edema patient was placed on BiPAP for oxygenation and was placed on IV Lasix 2 times daily. During the her  hospital stay she was weaned off from BiPAP and was placed on room air and has been saturating well. She has been diuresing with IV Lasix. Her dry body weight was 98 kg and was able to bring around 99 on the day of discharge. She has been ambulating well without any oxygen and without any shortness of breath. She was started on Lasix 40 mg twice daily. ECHO was done which shows grade 2 diastolic dysfunction with the EF of 55%. He came with mild SANDEEP and her blood pressure was on soft side so her losartan was stopped during the hospital stay. The day of discharge she was discharged with a reduced dose of losartan 25 mg daily.    New medication    No new medication    Change medication:    Losartan dose was reduced to 25 mg daily instead of 100 mg daily         Review of Systems   Constitutional:  Negative for appetite change, chills, diaphoresis, fatigue and fever.   HENT:  Negative for congestion, ear discharge, ear pain, postnasal drip, rhinorrhea, sinus pressure, sinus pain, sneezing and sore throat.    Eyes:  Negative for pain, discharge, redness, itching and visual disturbance.   Respiratory:  Negative for apnea, cough, chest tightness, shortness of breath and wheezing.    Cardiovascular:  Negative for chest pain, palpitations and leg swelling.   Gastrointestinal:  Negative for abdominal pain, blood in stool, constipation, diarrhea, nausea and vomiting.   Endocrine: Negative for cold intolerance, heat intolerance, polydipsia and polyuria.   Genitourinary:  Negative for dysuria, flank pain, frequency, hematuria and urgency.   Musculoskeletal:  Negative for arthralgias, back pain, myalgias, neck pain and neck stiffness.   Skin:  Negative for color change and rash.   Allergic/Immunologic: Negative for environmental allergies and food allergies.   Neurological:  Negative for dizziness, tremors, seizures, syncope, speech difficulty, weakness, light-headedness, numbness and headaches.   Hematological:  Negative for  "adenopathy. Does not bruise/bleed easily.   Psychiatric/Behavioral:  Negative for agitation, confusion, decreased concentration, dysphoric mood, hallucinations, self-injury, sleep disturbance and suicidal ideas. The patient is not nervous/anxious and is not hyperactive.    All other systems reviewed and are negative.      Objective   /57 (BP Location: Left arm, Patient Position: Sitting)   Pulse 59   Temp 97.8 °F (36.6 °C) (Tympanic)   Ht 5' 2.5\" (1.588 m)   Wt 96.6 kg (213 lb)   SpO2 96%   BMI 38.34 kg/m²      Physical Exam  Vitals and nursing note reviewed.   Constitutional:       General: She is not in acute distress.     Appearance: Normal appearance. She is well-developed. She is obese. She is not ill-appearing, toxic-appearing or diaphoretic.   HENT:      Head: Normocephalic and atraumatic.      Right Ear: Tympanic membrane normal.      Left Ear: Tympanic membrane normal.      Nose: Nose normal.      Mouth/Throat:      Mouth: Mucous membranes are moist.      Pharynx: Oropharynx is clear.     Eyes:      Conjunctiva/sclera: Conjunctivae normal.      Pupils: Pupils are equal, round, and reactive to light.     Neck:      Vascular: No carotid bruit.     Cardiovascular:      Rate and Rhythm: Normal rate and regular rhythm.      Pulses: Normal pulses.      Heart sounds: Normal heart sounds. No murmur heard.  Pulmonary:      Effort: Pulmonary effort is normal. No respiratory distress.      Breath sounds: Normal breath sounds. No wheezing.   Chest:      Chest wall: No tenderness.   Abdominal:      General: Bowel sounds are normal.      Palpations: Abdomen is soft. There is no mass.      Tenderness: There is no abdominal tenderness.     Musculoskeletal:         General: No swelling or tenderness. Normal range of motion.      Cervical back: Normal range of motion and neck supple. No tenderness.      Right lower leg: No edema.      Left lower leg: No edema.   Lymphadenopathy:      Cervical: No cervical " adenopathy.     Skin:     General: Skin is warm and dry.      Capillary Refill: Capillary refill takes less than 2 seconds.      Findings: No erythema, lesion or rash.     Neurological:      General: No focal deficit present.      Mental Status: She is alert and oriented to person, place, and time. Mental status is at baseline.      Motor: No weakness.      Coordination: Coordination normal.      Gait: Gait normal.     Psychiatric:         Mood and Affect: Mood normal.         Behavior: Behavior normal.         Thought Content: Thought content normal.         Judgment: Judgment normal.

## 2025-06-26 NOTE — ASSESSMENT & PLAN NOTE
Wt Readings from Last 3 Encounters:   06/26/25 96.6 kg (213 lb)   04/22/25 95.3 kg (210 lb)   04/08/25 95.9 kg (211 lb 6.4 oz)       Appears euvolemic on history and physical today.  Continue current management.  Continue follow-up and plan per cardiology.  Will repeat basic metabolic panel at this point to monitor electrolytes and kidney function.  Educated on ER precautions.        Orders:    Basic metabolic panel; Future    losartan (COZAAR) 25 mg tablet; Take 1 tablet (25 mg total) by mouth daily

## 2025-07-02 ENCOUNTER — RA CDI HCC (OUTPATIENT)
Dept: RADIOLOGY | Facility: HOSPITAL | Age: 70
End: 2025-07-02

## 2025-07-02 DIAGNOSIS — F41.1 GENERALIZED ANXIETY DISORDER: ICD-10-CM

## 2025-07-02 DIAGNOSIS — E11.9 TYPE 2 DIABETES MELLITUS WITH HEMOGLOBIN A1C GOAL OF LESS THAN 7.0% (HCC): ICD-10-CM

## 2025-07-02 DIAGNOSIS — I50.33 ACUTE ON CHRONIC HEART FAILURE WITH PRESERVED EJECTION FRACTION (HCC): Primary | ICD-10-CM

## 2025-07-02 RX ORDER — LORAZEPAM 0.5 MG/1
0.5 TABLET ORAL
Qty: 30 TABLET | Refills: 0 | Status: SHIPPED | OUTPATIENT
Start: 2025-07-02

## 2025-07-02 RX ORDER — SOTALOL HYDROCHLORIDE 120 MG/1
120 TABLET ORAL 2 TIMES DAILY
Qty: 180 TABLET | Refills: 1 | Status: SHIPPED | OUTPATIENT
Start: 2025-07-02

## 2025-07-02 NOTE — TELEPHONE ENCOUNTER
Reason for call:   [x] Refill   [] Prior Auth  [] Other:     Office:   [x] PCP/Provider - Gen Patterson PA-C  / KARIME LAU   [] Specialty/Provider -     Medication:     Sotalol (BETAPACE) 120 mg tablet  Take 120 mg by mouth 2 (two) times a day    #180    LORazepam (ATIVAN) 0.5 mg tablet   Take 1 tablet (0.5 mg total) by mouth daily at bedtime as needed for anxiety   #30    Pharmacy:  [address] [place] - [address] [facility]   Does the patient have enough for 3 days?   [] Yes   [x] No - Send as HP to POD

## 2025-07-04 ENCOUNTER — NURSE TRIAGE (OUTPATIENT)
Dept: OTHER | Facility: OTHER | Age: 70
End: 2025-07-04

## 2025-07-04 DIAGNOSIS — I50.33 ACUTE ON CHRONIC HEART FAILURE WITH PRESERVED EJECTION FRACTION (HCC): ICD-10-CM

## 2025-07-04 DIAGNOSIS — I50.9 ACUTE ON CHRONIC CONGESTIVE HEART FAILURE, UNSPECIFIED HEART FAILURE TYPE (HCC): ICD-10-CM

## 2025-07-04 DIAGNOSIS — I25.10 CORONARY ARTERY DISEASE INVOLVING NATIVE HEART WITHOUT ANGINA PECTORIS, UNSPECIFIED VESSEL OR LESION TYPE: ICD-10-CM

## 2025-07-04 RX ORDER — CLOPIDOGREL BISULFATE 75 MG/1
75 TABLET ORAL DAILY
Qty: 90 TABLET | Refills: 0 | Status: SHIPPED | OUTPATIENT
Start: 2025-07-04

## 2025-07-04 RX ORDER — LOSARTAN POTASSIUM 25 MG/1
25 TABLET ORAL DAILY
Qty: 90 TABLET | Refills: 0 | Status: SHIPPED | OUTPATIENT
Start: 2025-07-04

## 2025-07-04 NOTE — TELEPHONE ENCOUNTER
"Regarding: Losatan and Plavix refill  ----- Message from Nini GANDHI sent at 7/4/2025  9:09 AM EDT -----  Patient stated, \"I need two of my medication refilled as I will ran out of them on Sunday. Losartan 25 mg and Plavix 75 mg.\"    Pharmacy: [facility] [address]  [address] [place] [id number]  Phone: [phone]    "

## 2025-07-04 NOTE — TELEPHONE ENCOUNTER
Medication Refill Request       Medication: Plavix     Dose/Frequency: 75 mg QD    Quantity: 90    Pharmacy:   [address] Phone: [phone]   Fax: [phone]          Office:   [x] PCP/Provider -   [] Specialty/Provider -     Does the patient have enough for 3 days?   [x] Yes- Send to POD (normal priority)   [] No - Send as HP to POD    Is the patient completely out of the medication or does not have enough until the next business day?  [] Yes - send to Call Hub  [] No - Send as HP to POD     Medication Refill Request       Medication: Losartin    Dose/Frequency: 25 mg QD    Quantity: 90    Pharmacy:   [address] Phone: [phone]   Fax: [phone]          Office:   [x] PCP/Provider -   [] Specialty/Provider -     Does the patient have enough for 3 days?   [] Yes- Send to POD (normal priority)   [] No - Send as HP to POD    Is the patient completely out of the medication or does not have enough until the next business day?  [] Yes - send to Call Hub  [] No - Send as HP to POD

## 2025-07-10 ENCOUNTER — VBI (OUTPATIENT)
Dept: ADMINISTRATIVE | Facility: OTHER | Age: 70
End: 2025-07-10

## 2025-07-10 NOTE — TELEPHONE ENCOUNTER
07/10/25 10:40 AM     Chart reviewed for Diabetic Eye Exam was/were not submitted to the patient's insurance.     Sophie Sanches MA   [facility]

## 2025-07-15 ENCOUNTER — OFFICE VISIT (OUTPATIENT)
Dept: FAMILY MEDICINE CLINIC | Facility: CLINIC | Age: 70
End: 2025-07-15
Payer: COMMERCIAL

## 2025-07-15 VITALS
OXYGEN SATURATION: 97 % | SYSTOLIC BLOOD PRESSURE: 124 MMHG | HEART RATE: 66 BPM | WEIGHT: 208.2 LBS | HEIGHT: 63 IN | DIASTOLIC BLOOD PRESSURE: 64 MMHG | BODY MASS INDEX: 36.89 KG/M2 | TEMPERATURE: 97.9 F

## 2025-07-15 DIAGNOSIS — I10 HTN, GOAL BELOW 140/90: ICD-10-CM

## 2025-07-15 DIAGNOSIS — Z13.820 SCREENING FOR OSTEOPOROSIS: ICD-10-CM

## 2025-07-15 DIAGNOSIS — Z12.31 ENCOUNTER FOR SCREENING MAMMOGRAM FOR BREAST CANCER: ICD-10-CM

## 2025-07-15 DIAGNOSIS — I25.10 CORONARY ARTERY DISEASE INVOLVING NATIVE HEART WITHOUT ANGINA PECTORIS, UNSPECIFIED VESSEL OR LESION TYPE: ICD-10-CM

## 2025-07-15 DIAGNOSIS — J44.9 CHRONIC OBSTRUCTIVE PULMONARY DISEASE, UNSPECIFIED COPD TYPE (HCC): ICD-10-CM

## 2025-07-15 DIAGNOSIS — E11.3391 MODERATE NONPROLIFERATIVE DIABETIC RETINOPATHY OF RIGHT EYE WITHOUT MACULAR EDEMA ASSOCIATED WITH TYPE 2 DIABETES MELLITUS (HCC): ICD-10-CM

## 2025-07-15 DIAGNOSIS — F33.9 DEPRESSION, RECURRENT (HCC): ICD-10-CM

## 2025-07-15 DIAGNOSIS — E11.9 TYPE 2 DIABETES MELLITUS WITH HEMOGLOBIN A1C GOAL OF LESS THAN 7.0% (HCC): ICD-10-CM

## 2025-07-15 DIAGNOSIS — Z12.11 COLON CANCER SCREENING: ICD-10-CM

## 2025-07-15 DIAGNOSIS — I50.9 CHRONIC CONGESTIVE HEART FAILURE, UNSPECIFIED HEART FAILURE TYPE (HCC): ICD-10-CM

## 2025-07-15 DIAGNOSIS — E03.9 HYPOTHYROIDISM, UNSPECIFIED TYPE: ICD-10-CM

## 2025-07-15 DIAGNOSIS — I48.11 LONGSTANDING PERSISTENT ATRIAL FIBRILLATION (HCC): Primary | ICD-10-CM

## 2025-07-15 DIAGNOSIS — E66.01 MORBID OBESITY DUE TO EXCESS CALORIES (HCC): ICD-10-CM

## 2025-07-15 PROBLEM — I50.33 ACUTE ON CHRONIC HEART FAILURE WITH PRESERVED EJECTION FRACTION (HCC): Status: RESOLVED | Noted: 2025-01-08 | Resolved: 2025-07-15

## 2025-07-15 PROBLEM — J96.01 ACUTE RESPIRATORY FAILURE WITH HYPOXIA AND HYPERCAPNIA (HCC): Status: RESOLVED | Noted: 2025-06-26 | Resolved: 2025-07-15

## 2025-07-15 PROBLEM — J96.02 ACUTE RESPIRATORY FAILURE WITH HYPOXIA AND HYPERCAPNIA (HCC): Status: RESOLVED | Noted: 2025-06-26 | Resolved: 2025-07-15

## 2025-07-15 PROCEDURE — G2211 COMPLEX E/M VISIT ADD ON: HCPCS

## 2025-07-15 PROCEDURE — 99214 OFFICE O/P EST MOD 30 MIN: CPT

## 2025-07-22 ENCOUNTER — TELEPHONE (OUTPATIENT)
Age: 70
End: 2025-07-22

## 2025-07-22 NOTE — TELEPHONE ENCOUNTER
Caller: Patient    Doctor: Dr. Hassan    Reason for call: patient calling to request an injection. Patient states that Dr Hassan told her to give a call when she was ready.    Please assist    Call back#: [phone]

## 2025-07-22 NOTE — TELEPHONE ENCOUNTER
Pt seen 3/5/25 RM.  I do not see any future inj listed.   Were you planning on doing an TRE in the future?

## 2025-07-23 ENCOUNTER — PREP FOR PROCEDURE (OUTPATIENT)
Dept: PAIN MEDICINE | Facility: CLINIC | Age: 70
End: 2025-07-23

## 2025-07-23 DIAGNOSIS — M54.14 THORACIC RADICULOPATHY: Primary | ICD-10-CM

## 2025-07-24 NOTE — TELEPHONE ENCOUNTER
Reason for call:   [x] Refill   [] Prior Auth  [] Other:     Office:   [x] PCP/Provider -  [facility]  Authorized By: Gen Patterson PA-C  [] Specialty/Provider -     Medication:   Repatha SureClick 140 MG/ML SOAJ     Pharmacy: [facility] [address] [place] [address] [place] - [address] [place] [address] [phone]     Local Pharmacy   Does the patient have enough for 3 days?   [x] Yes   [] No - Send as HP to POD    Mail Away Pharmacy   Does the patient have enough for 10 days?   [] Yes   [] No - Send as HP to POD

## 2025-07-25 RX ORDER — EVOLOCUMAB 140 MG/ML
140 INJECTION, SOLUTION SUBCUTANEOUS
Qty: 1 ML | Refills: 0 | OUTPATIENT
Start: 2025-07-25

## 2025-07-30 DIAGNOSIS — F41.1 GENERALIZED ANXIETY DISORDER: ICD-10-CM

## 2025-07-30 DIAGNOSIS — I50.9 ACUTE ON CHRONIC CONGESTIVE HEART FAILURE, UNSPECIFIED HEART FAILURE TYPE (HCC): ICD-10-CM

## 2025-07-30 RX ORDER — LORAZEPAM 0.5 MG/1
0.5 TABLET ORAL
Qty: 30 TABLET | Refills: 0 | Status: SHIPPED | OUTPATIENT
Start: 2025-07-30

## 2025-07-30 RX ORDER — POTASSIUM CHLORIDE 1500 MG/1
20 TABLET, EXTENDED RELEASE ORAL DAILY
Qty: 30 TABLET | Refills: 5 | Status: SHIPPED | OUTPATIENT
Start: 2025-07-30

## 2025-08-08 ENCOUNTER — TELEMEDICINE (OUTPATIENT)
Dept: FAMILY MEDICINE CLINIC | Facility: CLINIC | Age: 70
End: 2025-08-08

## 2025-08-08 DIAGNOSIS — E11.9 TYPE 2 DIABETES MELLITUS WITH HEMOGLOBIN A1C GOAL OF LESS THAN 7.0% (HCC): Primary | ICD-10-CM

## 2025-08-08 PROCEDURE — PBNCHG PB NO CHARGE PLACEHOLDER: Performed by: PHARMACIST

## 2025-08-19 ENCOUNTER — TELEPHONE (OUTPATIENT)
Age: 70
End: 2025-08-19